# Patient Record
Sex: FEMALE | Race: ASIAN | NOT HISPANIC OR LATINO
[De-identification: names, ages, dates, MRNs, and addresses within clinical notes are randomized per-mention and may not be internally consistent; named-entity substitution may affect disease eponyms.]

---

## 2019-03-25 ENCOUNTER — APPOINTMENT (OUTPATIENT)
Dept: MAMMOGRAPHY | Facility: IMAGING CENTER | Age: 84
End: 2019-03-25
Payer: MEDICAID

## 2019-03-25 ENCOUNTER — APPOINTMENT (OUTPATIENT)
Dept: ULTRASOUND IMAGING | Facility: IMAGING CENTER | Age: 84
End: 2019-03-25
Payer: MEDICAID

## 2019-03-25 ENCOUNTER — OUTPATIENT (OUTPATIENT)
Dept: OUTPATIENT SERVICES | Facility: HOSPITAL | Age: 84
LOS: 1 days | End: 2019-03-25
Payer: MEDICAID

## 2019-03-25 DIAGNOSIS — Z00.8 ENCOUNTER FOR OTHER GENERAL EXAMINATION: ICD-10-CM

## 2019-03-25 PROBLEM — Z00.00 ENCOUNTER FOR PREVENTIVE HEALTH EXAMINATION: Status: ACTIVE | Noted: 2019-03-25

## 2019-03-25 PROCEDURE — G0279: CPT | Mod: 26

## 2019-03-25 PROCEDURE — 76642 ULTRASOUND BREAST LIMITED: CPT | Mod: 26,LT

## 2019-03-25 PROCEDURE — 77066 DX MAMMO INCL CAD BI: CPT | Mod: 26

## 2019-04-01 ENCOUNTER — RESULT REVIEW (OUTPATIENT)
Age: 84
End: 2019-04-01

## 2019-04-01 ENCOUNTER — APPOINTMENT (OUTPATIENT)
Dept: ULTRASOUND IMAGING | Facility: IMAGING CENTER | Age: 84
End: 2019-04-01
Payer: MEDICAID

## 2019-04-01 ENCOUNTER — OUTPATIENT (OUTPATIENT)
Dept: OUTPATIENT SERVICES | Facility: HOSPITAL | Age: 84
LOS: 1 days | End: 2019-04-01
Payer: MEDICAID

## 2019-04-01 DIAGNOSIS — Z00.8 ENCOUNTER FOR OTHER GENERAL EXAMINATION: ICD-10-CM

## 2019-04-01 PROCEDURE — 19000 PUNCTURE ASPIR CYST BREAST: CPT | Mod: LT

## 2019-04-01 PROCEDURE — 88305 TISSUE EXAM BY PATHOLOGIST: CPT

## 2019-04-01 PROCEDURE — 88173 CYTOPATH EVAL FNA REPORT: CPT

## 2019-04-01 PROCEDURE — 88173 CYTOPATH EVAL FNA REPORT: CPT | Mod: 26

## 2019-04-01 PROCEDURE — 76642 ULTRASOUND BREAST LIMITED: CPT

## 2019-04-01 PROCEDURE — 76942 ECHO GUIDE FOR BIOPSY: CPT

## 2019-04-01 PROCEDURE — 77065 DX MAMMO INCL CAD UNI: CPT

## 2019-04-01 PROCEDURE — 19000 PUNCTURE ASPIR CYST BREAST: CPT

## 2019-04-01 PROCEDURE — 88305 TISSUE EXAM BY PATHOLOGIST: CPT | Mod: 26

## 2019-04-01 PROCEDURE — 19083 BX BREAST 1ST LESION US IMAG: CPT

## 2019-04-01 PROCEDURE — 77065 DX MAMMO INCL CAD UNI: CPT | Mod: 26,LT

## 2019-04-01 PROCEDURE — A4648: CPT

## 2019-04-01 PROCEDURE — 88377 M/PHMTRC ALYS ISHQUANT/SEMIQ: CPT

## 2019-04-01 PROCEDURE — 19083 BX BREAST 1ST LESION US IMAG: CPT | Mod: LT

## 2019-04-01 PROCEDURE — 88305 TISSUE EXAM BY PATHOLOGIST: CPT | Mod: 26,59

## 2019-04-01 PROCEDURE — 88360 TUMOR IMMUNOHISTOCHEM/MANUAL: CPT | Mod: 26

## 2019-04-01 PROCEDURE — 88377 M/PHMTRC ALYS ISHQUANT/SEMIQ: CPT | Mod: 26

## 2019-04-01 PROCEDURE — 76942 ECHO GUIDE FOR BIOPSY: CPT | Mod: 26,59

## 2019-04-01 PROCEDURE — 88360 TUMOR IMMUNOHISTOCHEM/MANUAL: CPT

## 2019-04-01 PROCEDURE — G0279: CPT

## 2019-04-01 PROCEDURE — 77066 DX MAMMO INCL CAD BI: CPT

## 2019-04-04 LAB — NON-GYNECOLOGICAL CYTOLOGY STUDY: SIGNIFICANT CHANGE UP

## 2019-04-08 ENCOUNTER — APPOINTMENT (OUTPATIENT)
Dept: SURGICAL ONCOLOGY | Facility: CLINIC | Age: 84
End: 2019-04-08
Payer: MEDICAID

## 2019-04-08 VITALS
DIASTOLIC BLOOD PRESSURE: 66 MMHG | HEART RATE: 72 BPM | WEIGHT: 160 LBS | HEIGHT: 66 IN | BODY MASS INDEX: 25.71 KG/M2 | SYSTOLIC BLOOD PRESSURE: 169 MMHG | RESPIRATION RATE: 18 BRPM

## 2019-04-08 DIAGNOSIS — Z78.9 OTHER SPECIFIED HEALTH STATUS: ICD-10-CM

## 2019-04-08 PROCEDURE — 99205 OFFICE O/P NEW HI 60 MIN: CPT

## 2019-04-08 NOTE — ASSESSMENT
[FreeTextEntry1] : IMP:\par Newly diagnosed left breast cancer\par \par PLAN:\par The patient and I discussed the surgical management of breast cancer. I explained that breast cancer can be treated with 2 main surgical approaches. One is breast conserving therapy. The other is mastectomy with or without immediate reconstruction by a plastic surgeon. Breast conserving therapy involves wide excision of the involved area. Negative margins must be achieved with this wide excision. In addition, evaluation of the lymph nodes either with a sentinel lymph node biopsy or an axillary lymph node dissection may be required. This treatment usually requires postoperative radiation to the breast. Treatment with mastectomy also involves evaluation of the lymph node with a sentinel lymph node biopsy or axillary lymph node dissection. Post operative radiation therapy may be needed even after mastectomy in certain advanced cases.\par

## 2019-04-08 NOTE — HISTORY OF PRESENT ILLNESS
[de-identified] : 89 year-old female presents for an initial consultation.  She was referred for breast imaging on 3/25/19 to evaluate a palpable abnormality in the left breast.  Mammogram revealed a 6.3 cm mass in the slightly outer retroareolar left breast in the region of palpable concern, corresponding to a large predominantly cystic mass containing solid mural nodule elements at 1:00 on ultrasound, along with left skin thickening and left nipple retraction.  Ultrasound guided biopsy was recommended (BIRADS 4c).\par \par Ultrasound guided biopsy was consistent with invasive ductal carcinoma, ER+/MI-/HER2-.\par \par She has a family history of breast cancer involving her grand daughter who was diagnosed at age 44.\par \par Her past medical history includes hypertension, hyperlipidemia, diabetes and GERD.  She takes baby aspirin daily.

## 2019-04-08 NOTE — CONSULT LETTER
[Dear  ___] : Dear  [unfilled], [Consult Letter:] : I had the pleasure of evaluating your patient, [unfilled]. [Consult Closing:] : Thank you very much for allowing me to participate in the care of this patient.  If you have any questions, please do not hesitate to contact me. [Sincerely,] : Sincerely, [FreeTextEntry2] : Fredis Paidoussis MD

## 2019-04-08 NOTE — OB HISTORY
[Menopause Age____] : age at menopause was [unfilled] [Total Preg ___] : G[unfilled] [Live Births ___] : P[unfilled]  [FreeTextEntry2] : age 20 at first pregnancy

## 2019-04-08 NOTE — PHYSICAL EXAM
[Normal] : supple, no neck mass and thyroid not enlarged [Normal Supraclavicular Lymph Nodes] : normal supraclavicular lymph nodes [Normal Axillary Lymph Nodes] : normal axillary lymph nodes [Normal] : oriented to person, place and time, with appropriate affect [FreeTextEntry1] : AB present during exam  [de-identified] : Normal S1, S2.  Regular rate and rhythm.  [de-identified] : Complete breast exam performed in supine and upright positions.  Left breast 12:30-3:00 location 5 cm mass with overlying redness and nipple retraction.  No right breast mass or nipple discharge.  Probable left axillary lymphadenopathy.  [de-identified] : Clear breath sounds bilaterally, normal respiratory effort.  [de-identified] : uses wheelchair

## 2019-04-09 ENCOUNTER — TRANSCRIPTION ENCOUNTER (OUTPATIENT)
Age: 84
End: 2019-04-09

## 2019-04-18 ENCOUNTER — OUTPATIENT (OUTPATIENT)
Dept: OUTPATIENT SERVICES | Facility: HOSPITAL | Age: 84
LOS: 1 days | Discharge: HOME | End: 2019-04-18

## 2019-04-18 DIAGNOSIS — C50.919 MALIGNANT NEOPLASM OF UNSPECIFIED SITE OF UNSPECIFIED FEMALE BREAST: ICD-10-CM

## 2019-04-22 ENCOUNTER — APPOINTMENT (OUTPATIENT)
Dept: HEMATOLOGY ONCOLOGY | Facility: CLINIC | Age: 84
End: 2019-04-22
Payer: MEDICAID

## 2019-04-22 VITALS
SYSTOLIC BLOOD PRESSURE: 133 MMHG | DIASTOLIC BLOOD PRESSURE: 65 MMHG | TEMPERATURE: 98.2 F | RESPIRATION RATE: 16 BRPM | OXYGEN SATURATION: 95 % | HEART RATE: 86 BPM

## 2019-04-22 DIAGNOSIS — Z80.3 FAMILY HISTORY OF MALIGNANT NEOPLASM OF BREAST: ICD-10-CM

## 2019-04-22 DIAGNOSIS — Z91.81 HISTORY OF FALLING: ICD-10-CM

## 2019-04-22 PROCEDURE — 99205 OFFICE O/P NEW HI 60 MIN: CPT

## 2019-04-24 PROBLEM — Z91.81 HISTORY OF FALL: Status: RESOLVED | Noted: 2019-04-24 | Resolved: 2019-04-24

## 2019-04-24 PROBLEM — Z80.3 FAMILY HISTORY OF MALIGNANT NEOPLASM OF FEMALE BREAST: Status: ACTIVE | Noted: 2019-04-24

## 2019-04-24 NOTE — REVIEW OF SYSTEMS
[Difficulty Walking] : difficulty walking [Negative] : Heme/Lymph [Skin Wound] : no skin wound [Skin Rash] : no skin rash [Dizziness] : no dizziness [Confused] : no confusion [Fainting] : no fainting [de-identified] : left breast mass

## 2019-04-24 NOTE — CONSULT LETTER
[Dear  ___] : Dear  [unfilled], [( Thank you for referring [unfilled] for consultation for _____ )] : Thank you for referring [unfilled] for consultation for [unfilled] [Consult Letter:] : I had the pleasure of evaluating your patient, [unfilled]. [Consult Closing:] : Thank you very much for allowing me to participate in the care of this patient.  If you have any questions, please do not hesitate to contact me. [Please see my note below.] : Please see my note below. [Sincerely,] : Sincerely, [FreeTextEntry2] : Elver Sharif MD\par [address] [place] [address]\[place] [address] [FreeTextEntry3] : Donny Sadler MD\par Attending\par [facility]\par  [DrDanish  ___] : Dr. FERREIRA

## 2019-04-24 NOTE — ASSESSMENT
[FreeTextEntry1] : She is a 89 y/o  F with newly diagnosed left breast cancer. We reviewed her pathology and the standard of care for breast cancer. We reviewed her and her families' concerns about surgery. We reviewed if patient is a surgical candidate, we would favor surgical resection. If she is not a surgical candidate, we reviewed endocrine thearpy: anastrozole once a day. We reviewed the pros and cons of endocrine therapy including but not limited to: fatigue, arthralgias, stiffness, hot flashes, GI upset, and back pain. Given her baseline mobility issues, we reviewed with family potential of observation alone after surgical resection. Questions were answered to her family to their satisfaction. Will have social work follow up with family to see what additional services can be arranged at home. She will follow up with Dr Sharif.

## 2019-04-24 NOTE — REASON FOR VISIT
[Initial Consultation] : an initial consultation [Family Member] : family member [FreeTextEntry2] : right breast cancer

## 2019-05-01 ENCOUNTER — OUTPATIENT (OUTPATIENT)
Dept: OUTPATIENT SERVICES | Facility: HOSPITAL | Age: 84
LOS: 1 days | End: 2019-05-01
Payer: MEDICAID

## 2019-05-01 PROCEDURE — G9001: CPT

## 2019-05-02 ENCOUNTER — OUTPATIENT (OUTPATIENT)
Dept: OUTPATIENT SERVICES | Facility: HOSPITAL | Age: 84
LOS: 1 days | End: 2019-05-02
Payer: MEDICAID

## 2019-05-02 VITALS
TEMPERATURE: 98 F | WEIGHT: 154.1 LBS | HEART RATE: 63 BPM | OXYGEN SATURATION: 97 % | HEIGHT: 64 IN | SYSTOLIC BLOOD PRESSURE: 120 MMHG | DIASTOLIC BLOOD PRESSURE: 50 MMHG | RESPIRATION RATE: 16 BRPM

## 2019-05-02 DIAGNOSIS — Z87.19 PERSONAL HISTORY OF OTHER DISEASES OF THE DIGESTIVE SYSTEM: ICD-10-CM

## 2019-05-02 DIAGNOSIS — Z90.710 ACQUIRED ABSENCE OF BOTH CERVIX AND UTERUS: Chronic | ICD-10-CM

## 2019-05-02 DIAGNOSIS — C50.912 MALIGNANT NEOPLASM OF UNSPECIFIED SITE OF LEFT FEMALE BREAST: ICD-10-CM

## 2019-05-02 DIAGNOSIS — E11.9 TYPE 2 DIABETES MELLITUS WITHOUT COMPLICATIONS: ICD-10-CM

## 2019-05-02 DIAGNOSIS — Z90.49 ACQUIRED ABSENCE OF OTHER SPECIFIED PARTS OF DIGESTIVE TRACT: Chronic | ICD-10-CM

## 2019-05-02 DIAGNOSIS — I10 ESSENTIAL (PRIMARY) HYPERTENSION: ICD-10-CM

## 2019-05-02 LAB
ALBUMIN SERPL ELPH-MCNC: 3.7 G/DL — SIGNIFICANT CHANGE UP (ref 3.3–5)
ALP SERPL-CCNC: 48 U/L — SIGNIFICANT CHANGE UP (ref 40–120)
ALT FLD-CCNC: 11 U/L — SIGNIFICANT CHANGE UP (ref 4–33)
ANION GAP SERPL CALC-SCNC: 13 MMO/L — SIGNIFICANT CHANGE UP (ref 7–14)
AST SERPL-CCNC: 17 U/L — SIGNIFICANT CHANGE UP (ref 4–32)
BILIRUB SERPL-MCNC: 0.4 MG/DL — SIGNIFICANT CHANGE UP (ref 0.2–1.2)
BUN SERPL-MCNC: 30 MG/DL — HIGH (ref 7–23)
CALCIUM SERPL-MCNC: 9.3 MG/DL — SIGNIFICANT CHANGE UP (ref 8.4–10.5)
CHLORIDE SERPL-SCNC: 102 MMOL/L — SIGNIFICANT CHANGE UP (ref 98–107)
CO2 SERPL-SCNC: 25 MMOL/L — SIGNIFICANT CHANGE UP (ref 22–31)
CREAT SERPL-MCNC: 2.25 MG/DL — HIGH (ref 0.5–1.3)
GLUCOSE SERPL-MCNC: 259 MG/DL — HIGH (ref 70–99)
HBA1C BLD-MCNC: 7.6 % — HIGH (ref 4–5.6)
HCT VFR BLD CALC: 36.3 % — SIGNIFICANT CHANGE UP (ref 34.5–45)
HGB BLD-MCNC: 11.9 G/DL — SIGNIFICANT CHANGE UP (ref 11.5–15.5)
MCHC RBC-ENTMCNC: 32.5 PG — SIGNIFICANT CHANGE UP (ref 27–34)
MCHC RBC-ENTMCNC: 32.8 % — SIGNIFICANT CHANGE UP (ref 32–36)
MCV RBC AUTO: 99.2 FL — SIGNIFICANT CHANGE UP (ref 80–100)
NRBC # FLD: 0 K/UL — SIGNIFICANT CHANGE UP (ref 0–0)
PLATELET # BLD AUTO: 260 K/UL — SIGNIFICANT CHANGE UP (ref 150–400)
PMV BLD: 11.8 FL — SIGNIFICANT CHANGE UP (ref 7–13)
POTASSIUM SERPL-MCNC: 4.2 MMOL/L — SIGNIFICANT CHANGE UP (ref 3.5–5.3)
POTASSIUM SERPL-SCNC: 4.2 MMOL/L — SIGNIFICANT CHANGE UP (ref 3.5–5.3)
PROT SERPL-MCNC: 7.2 G/DL — SIGNIFICANT CHANGE UP (ref 6–8.3)
RBC # BLD: 3.66 M/UL — LOW (ref 3.8–5.2)
RBC # FLD: 19.4 % — HIGH (ref 10.3–14.5)
SODIUM SERPL-SCNC: 140 MMOL/L — SIGNIFICANT CHANGE UP (ref 135–145)
WBC # BLD: 7.89 K/UL — SIGNIFICANT CHANGE UP (ref 3.8–10.5)
WBC # FLD AUTO: 7.89 K/UL — SIGNIFICANT CHANGE UP (ref 3.8–10.5)

## 2019-05-02 PROCEDURE — 93010 ELECTROCARDIOGRAM REPORT: CPT

## 2019-05-02 RX ORDER — SODIUM CHLORIDE 9 MG/ML
1000 INJECTION, SOLUTION INTRAVENOUS
Refills: 0 | Status: DISCONTINUED | OUTPATIENT
Start: 2019-05-15 | End: 2019-05-16

## 2019-05-02 NOTE — H&P PST ADULT - OTHER CARE PROVIDERS
evaluated by cardiologist in 1/2019 - unable to recall name of cardiologist evaluated by cardiologist in 1/2019 - unable to recall the name of cardiologist

## 2019-05-02 NOTE — H&P PST ADULT - NSICDXFAMILYHX_GEN_ALL_CORE_FT
FAMILY HISTORY:  Family history of rectal cancer, brother  FH: breast cancer, granddaughter  FH: diabetes mellitus, sister

## 2019-05-02 NOTE — H&P PST ADULT - NSICDXPASTMEDICALHX_GEN_ALL_CORE_FT
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

## 2019-05-02 NOTE — H&P PST ADULT - ACTIVITY
ambulate with a walker at home, adl ambulate with a walker at home, adl, on w/c when goes to outside

## 2019-05-02 NOTE — H&P PST ADULT - NEGATIVE GENERAL GENITOURINARY SYMPTOMS
no gas in urine/no dysuria/normal urinary frequency/no hematuria/no urine discoloration/no bladder infections/no urinary hesitancy

## 2019-05-02 NOTE — H&P PST ADULT - NEGATIVE ENMT SYMPTOMS
no nasal discharge/no post-nasal discharge/no throat pain/no dysphagia/no tinnitus/no nasal obstruction/no sinus symptoms/no ear pain/no nasal congestion

## 2019-05-02 NOTE — H&P PST ADULT - HISTORY OF PRESENT ILLNESS
89 yo female with hx of HTN, DMT2, HLD presents to have PST evaluation with preop dx of malignant neoplasm of unspecified site of left female breast on 5/15/2019. Patient reports, she felt lump on left breast, went for an evaluation, had mammogram done, which revealed "abnormality on left breast", was sent for further evaluation, had biopsy done which revealed "malignancy", now scheduled for left breat lumpectomy on 5/15/2019 89 yo female with hx of HTN, DMT2, HLD, GERD presents to have PST evaluation with preop dx of malignant neoplasm of unspecified site of left female breast on 5/15/2019. Patient reports, felt a lump on left breast, went for an evaluation, had mammogram done, which revealed "abnormality on left breast", was sent for further evaluation, had biopsy done which revealed "malignancy", now scheduled for left breat lumpectomy on 5/15/2019

## 2019-05-02 NOTE — H&P PST ADULT - NSICDXPROBLEM_GEN_ALL_CORE_FT
PROBLEM DIAGNOSES  Problem: Malignant neoplasm of unspecified site of left female breast  Assessment and Plan: Scheduled for left breast lumpectomy on 5/15/2019.  labs done and results pending. Surgical scrub & preop instruction given and explained. Verbalized understanding.     Problem: H/O gastroesophageal reflux (GERD)  Assessment and Plan: Instructed to take omeprazole AM of surgery with a sip of water.     Problem: DM (diabetes mellitus)  Assessment and Plan: HbgA1C sent, pending result. Blood glucose - Accuchek ordered stat on admit.   Instructed to administer 32 units of Basaglar AM on 5/14/2019 & 24 units PM on 5/14/2019 (80 % of regular dose). Instructed to administer 20 units of Basaglar (50% of regular) AM of surgery.   OR booking was notified pt's DM status via fax.     Problem: Hypertension  Assessment and Plan: Instructed to take amlodipine, hydralazine AM of surgery with a sip of water.   Patient is scheduled for medical evaluation. Comparison EKG requested.

## 2019-05-02 NOTE — H&P PST ADULT - LANGUAGE ASSISTANCE NEEDED
Pt understands English, speaks little English Pt understands English, speaks little English - granddaughter (Selene) interpreted as needed.

## 2019-05-03 PROBLEM — E11.9 TYPE 2 DIABETES MELLITUS WITHOUT COMPLICATIONS: Chronic | Status: ACTIVE | Noted: 2019-05-02

## 2019-05-14 ENCOUNTER — TRANSCRIPTION ENCOUNTER (OUTPATIENT)
Age: 84
End: 2019-05-14

## 2019-05-14 PROBLEM — E78.5 HYPERLIPIDEMIA, UNSPECIFIED: Chronic | Status: ACTIVE | Noted: 2019-05-02

## 2019-05-14 PROBLEM — I10 ESSENTIAL (PRIMARY) HYPERTENSION: Chronic | Status: ACTIVE | Noted: 2019-05-02

## 2019-05-14 PROBLEM — Z87.19 PERSONAL HISTORY OF OTHER DISEASES OF THE DIGESTIVE SYSTEM: Chronic | Status: ACTIVE | Noted: 2019-05-02

## 2019-05-14 NOTE — ASU PATIENT PROFILE, ADULT - ABLE TO REACH PT
no/unable to leave mom, mail box full unable to leave mom, mail box full spoke with pt. and gave detailed instructions/yes

## 2019-05-15 ENCOUNTER — APPOINTMENT (OUTPATIENT)
Dept: SURGICAL ONCOLOGY | Facility: HOSPITAL | Age: 84
End: 2019-05-15

## 2019-05-15 ENCOUNTER — RESULT REVIEW (OUTPATIENT)
Age: 84
End: 2019-05-15

## 2019-05-15 ENCOUNTER — INPATIENT (INPATIENT)
Facility: HOSPITAL | Age: 84
LOS: 0 days | Discharge: HOME HEALTH | End: 2019-05-16
Attending: SURGERY | Admitting: SURGERY
Payer: MEDICAID

## 2019-05-15 VITALS
WEIGHT: 154.1 LBS | DIASTOLIC BLOOD PRESSURE: 53 MMHG | HEART RATE: 71 BPM | OXYGEN SATURATION: 97 % | TEMPERATURE: 98 F | SYSTOLIC BLOOD PRESSURE: 145 MMHG | HEIGHT: 63 IN | RESPIRATION RATE: 16 BRPM

## 2019-05-15 DIAGNOSIS — Z90.710 ACQUIRED ABSENCE OF BOTH CERVIX AND UTERUS: Chronic | ICD-10-CM

## 2019-05-15 DIAGNOSIS — C50.912 MALIGNANT NEOPLASM OF UNSPECIFIED SITE OF LEFT FEMALE BREAST: ICD-10-CM

## 2019-05-15 DIAGNOSIS — Z90.49 ACQUIRED ABSENCE OF OTHER SPECIFIED PARTS OF DIGESTIVE TRACT: Chronic | ICD-10-CM

## 2019-05-15 LAB
GLUCOSE BLDC GLUCOMTR-MCNC: 134 MG/DL — HIGH (ref 70–99)
GLUCOSE BLDC GLUCOMTR-MCNC: 364 MG/DL — HIGH (ref 70–99)
GLUCOSE BLDC GLUCOMTR-MCNC: 382 MG/DL — HIGH (ref 70–99)
GLUCOSE BLDC GLUCOMTR-MCNC: 67 MG/DL — LOW (ref 70–99)
GLUCOSE BLDC GLUCOMTR-MCNC: 80 MG/DL — SIGNIFICANT CHANGE UP (ref 70–99)

## 2019-05-15 PROCEDURE — 88307 TISSUE EXAM BY PATHOLOGIST: CPT | Mod: 26

## 2019-05-15 PROCEDURE — 88305 TISSUE EXAM BY PATHOLOGIST: CPT | Mod: 26

## 2019-05-15 RX ORDER — SODIUM CHLORIDE 9 MG/ML
1000 INJECTION, SOLUTION INTRAVENOUS
Refills: 0 | Status: DISCONTINUED | OUTPATIENT
Start: 2019-05-15 | End: 2019-05-16

## 2019-05-15 RX ORDER — DOCUSATE SODIUM 100 MG
100 CAPSULE ORAL THREE TIMES A DAY
Refills: 0 | Status: DISCONTINUED | OUTPATIENT
Start: 2019-05-15 | End: 2019-05-16

## 2019-05-15 RX ORDER — INSULIN LISPRO 100/ML
VIAL (ML) SUBCUTANEOUS AT BEDTIME
Refills: 0 | Status: DISCONTINUED | OUTPATIENT
Start: 2019-05-15 | End: 2019-05-16

## 2019-05-15 RX ORDER — DEXTROSE 50 % IN WATER 50 %
20 SYRINGE (ML) INTRAVENOUS ONCE
Refills: 0 | Status: COMPLETED | OUTPATIENT
Start: 2019-05-15 | End: 2019-05-15

## 2019-05-15 RX ORDER — ASPIRIN/CALCIUM CARB/MAGNESIUM 324 MG
81 TABLET ORAL DAILY
Refills: 0 | Status: DISCONTINUED | OUTPATIENT
Start: 2019-05-16 | End: 2019-05-16

## 2019-05-15 RX ORDER — POLYETHYLENE GLYCOL 3350 17 G/17G
17 POWDER, FOR SOLUTION ORAL DAILY
Refills: 0 | Status: DISCONTINUED | OUTPATIENT
Start: 2019-05-16 | End: 2019-05-16

## 2019-05-15 RX ORDER — GLUCAGON INJECTION, SOLUTION 0.5 MG/.1ML
1 INJECTION, SOLUTION SUBCUTANEOUS ONCE
Refills: 0 | Status: DISCONTINUED | OUTPATIENT
Start: 2019-05-15 | End: 2019-05-16

## 2019-05-15 RX ORDER — INSULIN LISPRO 100/ML
VIAL (ML) SUBCUTANEOUS
Refills: 0 | Status: DISCONTINUED | OUTPATIENT
Start: 2019-05-15 | End: 2019-05-16

## 2019-05-15 RX ORDER — DEXTROSE 50 % IN WATER 50 %
25 SYRINGE (ML) INTRAVENOUS ONCE
Refills: 0 | Status: DISCONTINUED | OUTPATIENT
Start: 2019-05-15 | End: 2019-05-16

## 2019-05-15 RX ORDER — FENTANYL CITRATE 50 UG/ML
25 INJECTION INTRAVENOUS
Refills: 0 | Status: DISCONTINUED | OUTPATIENT
Start: 2019-05-15 | End: 2019-05-15

## 2019-05-15 RX ORDER — HYDRALAZINE HCL 50 MG
100 TABLET ORAL
Refills: 0 | Status: DISCONTINUED | OUTPATIENT
Start: 2019-05-15 | End: 2019-05-16

## 2019-05-15 RX ORDER — METOPROLOL TARTRATE 50 MG
50 TABLET ORAL DAILY
Refills: 0 | Status: DISCONTINUED | OUTPATIENT
Start: 2019-05-16 | End: 2019-05-16

## 2019-05-15 RX ORDER — DEXTROSE 50 % IN WATER 50 %
15 SYRINGE (ML) INTRAVENOUS ONCE
Refills: 0 | Status: DISCONTINUED | OUTPATIENT
Start: 2019-05-15 | End: 2019-05-16

## 2019-05-15 RX ORDER — INSULIN GLARGINE 100 [IU]/ML
40 INJECTION, SOLUTION SUBCUTANEOUS EVERY MORNING
Refills: 0 | Status: DISCONTINUED | OUTPATIENT
Start: 2019-05-15 | End: 2019-05-16

## 2019-05-15 RX ORDER — PANTOPRAZOLE SODIUM 20 MG/1
40 TABLET, DELAYED RELEASE ORAL
Refills: 0 | Status: DISCONTINUED | OUTPATIENT
Start: 2019-05-16 | End: 2019-05-16

## 2019-05-15 RX ORDER — FUROSEMIDE 40 MG
20 TABLET ORAL DAILY
Refills: 0 | Status: DISCONTINUED | OUTPATIENT
Start: 2019-05-16 | End: 2019-05-16

## 2019-05-15 RX ORDER — AMLODIPINE BESYLATE 2.5 MG/1
10 TABLET ORAL DAILY
Refills: 0 | Status: DISCONTINUED | OUTPATIENT
Start: 2019-05-16 | End: 2019-05-16

## 2019-05-15 RX ORDER — SIMVASTATIN 20 MG/1
20 TABLET, FILM COATED ORAL AT BEDTIME
Refills: 0 | Status: DISCONTINUED | OUTPATIENT
Start: 2019-05-15 | End: 2019-05-16

## 2019-05-15 RX ORDER — ONDANSETRON 8 MG/1
4 TABLET, FILM COATED ORAL ONCE
Refills: 0 | Status: DISCONTINUED | OUTPATIENT
Start: 2019-05-15 | End: 2019-05-15

## 2019-05-15 RX ORDER — FENTANYL CITRATE 50 UG/ML
50 INJECTION INTRAVENOUS
Refills: 0 | Status: DISCONTINUED | OUTPATIENT
Start: 2019-05-15 | End: 2019-05-15

## 2019-05-15 RX ORDER — OXYCODONE HYDROCHLORIDE 5 MG/1
5 TABLET ORAL EVERY 6 HOURS
Refills: 0 | Status: DISCONTINUED | OUTPATIENT
Start: 2019-05-15 | End: 2019-05-16

## 2019-05-15 RX ORDER — DEXTROSE 50 % IN WATER 50 %
12.5 SYRINGE (ML) INTRAVENOUS ONCE
Refills: 0 | Status: DISCONTINUED | OUTPATIENT
Start: 2019-05-15 | End: 2019-05-16

## 2019-05-15 RX ORDER — DEXAMETHASONE 0.5 MG/5ML
8 ELIXIR ORAL ONCE
Refills: 0 | Status: DISCONTINUED | OUTPATIENT
Start: 2019-05-15 | End: 2019-05-15

## 2019-05-15 RX ORDER — DONEPEZIL HYDROCHLORIDE 10 MG/1
10 TABLET, FILM COATED ORAL AT BEDTIME
Refills: 0 | Status: DISCONTINUED | OUTPATIENT
Start: 2019-05-15 | End: 2019-05-16

## 2019-05-15 RX ORDER — INSULIN GLARGINE 100 [IU]/ML
30 INJECTION, SOLUTION SUBCUTANEOUS AT BEDTIME
Refills: 0 | Status: DISCONTINUED | OUTPATIENT
Start: 2019-05-15 | End: 2019-05-16

## 2019-05-15 RX ORDER — ACETAMINOPHEN 500 MG
975 TABLET ORAL EVERY 6 HOURS
Refills: 0 | Status: DISCONTINUED | OUTPATIENT
Start: 2019-05-15 | End: 2019-05-16

## 2019-05-15 RX ADMIN — SODIUM CHLORIDE 75 MILLILITER(S): 9 INJECTION, SOLUTION INTRAVENOUS at 15:00

## 2019-05-15 RX ADMIN — Medication 1 TABLET(S): at 18:03

## 2019-05-15 RX ADMIN — INSULIN GLARGINE 30 UNIT(S): 100 INJECTION, SOLUTION SUBCUTANEOUS at 22:38

## 2019-05-15 RX ADMIN — DONEPEZIL HYDROCHLORIDE 10 MILLIGRAM(S): 10 TABLET, FILM COATED ORAL at 22:38

## 2019-05-15 RX ADMIN — OXYCODONE HYDROCHLORIDE 5 MILLIGRAM(S): 5 TABLET ORAL at 22:38

## 2019-05-15 RX ADMIN — SIMVASTATIN 20 MILLIGRAM(S): 20 TABLET, FILM COATED ORAL at 22:38

## 2019-05-15 RX ADMIN — Medication 20 MILLILITER(S): at 12:31

## 2019-05-15 RX ADMIN — Medication 1 DROP(S): at 18:02

## 2019-05-15 RX ADMIN — Medication 3: at 22:39

## 2019-05-15 NOTE — BRIEF OPERATIVE NOTE - OPERATION/FINDINGS
Left breast lumpectomy. Lumpectomy specimen taken included 6.5 x 12 cm area of skin of the breast (including nipple). Margins as noted below. Tissues closed in layers primarily, then dermabond, steri-strips, and Mepilex dressings applied to skin.    Specimens:  1) left breast lumpectomy - double suture lateral, single suture short superior, long suture deep  2) deep margin

## 2019-05-15 NOTE — BRIEF OPERATIVE NOTE - NSICDXBRIEFPOSTOP_GEN_ALL_CORE_FT
POST-OP DIAGNOSIS:  Malignant neoplasm of unspecified site of left female breast 15-May-2019 14:43:18  Deya Marcelo

## 2019-05-15 NOTE — PROGRESS NOTE ADULT - SUBJECTIVE AND OBJECTIVE BOX
Surgery POST-OP CHECK NOTE:    Procedure: Left breast lumpectomy    Subjective: Resting comfortably in bed. Pain controlled. No N/V, CP, or SOB.    Vital Signs Last 24 Hrs  T(C): 36.5 (15 May 2019 17:00), Max: 36.8 (15 May 2019 14:20)  T(F): 97.7 (15 May 2019 17:00), Max: 98.2 (15 May 2019 14:20)  HR: 60 (15 May 2019 17:00) (60 - 81)  BP: 129/45 (15 May 2019 17:00) (118/47 - 148/58)  BP(mean): 66 (15 May 2019 17:00) (62 - 79)  RR: 17 (15 May 2019 17:00) (9 - 19)  SpO2: 95% (15 May 2019 17:00) (95% - 100%)  I&O's Summary    15 May 2019 07:01  -  15 May 2019 18:13  --------------------------------------------------------  IN: 615 mL / OUT: 0 mL / NET: 615 mL                 PHYSICAL EXAM:  Gen: NAD, well-developed  Neuro: AAOX3, PERRL, CNII-XII grossly intact  Chest:   CV: Pulse regularly present  Pulm: normal respiratory effort  GI: abd soft, nontender, nondistended  Ext: 2+ pulses throughout Surgery POST-OP CHECK NOTE:    Procedure: Left breast lumpectomy    Subjective: Resting comfortably in bed. Pain controlled. No N/V, CP, or SOB.    Vital Signs Last 24 Hrs  T(C): 36.5 (15 May 2019 17:00), Max: 36.8 (15 May 2019 14:20)  T(F): 97.7 (15 May 2019 17:00), Max: 98.2 (15 May 2019 14:20)  HR: 60 (15 May 2019 17:00) (60 - 81)  BP: 129/45 (15 May 2019 17:00) (118/47 - 148/58)  BP(mean): 66 (15 May 2019 17:00) (62 - 79)  RR: 17 (15 May 2019 17:00) (9 - 19)  SpO2: 95% (15 May 2019 17:00) (95% - 100%)  I&O's Summary    15 May 2019 07:01  -  15 May 2019 18:13  --------------------------------------------------------  IN: 615 mL / OUT: 0 mL / NET: 615 mL                 PHYSICAL EXAM:  Gen: NAD, well-developed  Neuro: AAOX3, PERRL, CNII-XII grossly intact  Chest: left breast lumpectomy site dressing w/ serous drainage at lateral aspect, surgical bra in place  CV: Pulse regularly present  Pulm: normal respiratory effort  GI: abd soft, nontender, nondistended  Ext: 2+ pulses throughout

## 2019-05-15 NOTE — BRIEF OPERATIVE NOTE - NSICDXBRIEFPREOP_GEN_ALL_CORE_FT
PRE-OP DIAGNOSIS:  Malignant neoplasm of unspecified site of left female breast 15-May-2019 14:43:08  Deya Marcelo

## 2019-05-15 NOTE — PROGRESS NOTE ADULT - ASSESSMENT
89yo F w/ L breast lump & mammogram that showed abnormality. Presented 5/15 & underwent L breast lumpectomy.    Plan  - Pain control: Tylenol 975mg PO q6h PRN, Oxy IR 5mg PO q6h PRN  - Consistent carb diet  - Monitor surgical site for signs of bleeding or hematoma  - 89yo F w/ L breast lump & mammogram that showed abnormality. Presented 5/15 & underwent L breast lumpectomy.    Plan  - Pain control: Tylenol 975mg PO q6h PRN, Oxy IR 5mg PO q6h PRN  - Consistent carb diet  - Monitor surgical site for signs of bleeding or hematoma  - Keep surgical bra in place

## 2019-05-16 ENCOUNTER — TRANSCRIPTION ENCOUNTER (OUTPATIENT)
Age: 84
End: 2019-05-16

## 2019-05-16 VITALS
OXYGEN SATURATION: 99 % | SYSTOLIC BLOOD PRESSURE: 148 MMHG | TEMPERATURE: 98 F | RESPIRATION RATE: 17 BRPM | HEART RATE: 68 BPM | DIASTOLIC BLOOD PRESSURE: 52 MMHG

## 2019-05-16 DIAGNOSIS — Z71.89 OTHER SPECIFIED COUNSELING: ICD-10-CM

## 2019-05-16 LAB
GLUCOSE BLDC GLUCOMTR-MCNC: 118 MG/DL — HIGH (ref 70–99)
GLUCOSE BLDC GLUCOMTR-MCNC: 163 MG/DL — HIGH (ref 70–99)

## 2019-05-16 RX ORDER — OXYCODONE HYDROCHLORIDE 5 MG/1
1 TABLET ORAL
Qty: 8 | Refills: 0
Start: 2019-05-16 | End: 2019-05-17

## 2019-05-16 RX ADMIN — Medication 100 MILLIGRAM(S): at 06:31

## 2019-05-16 RX ADMIN — OXYCODONE HYDROCHLORIDE 5 MILLIGRAM(S): 5 TABLET ORAL at 08:47

## 2019-05-16 RX ADMIN — Medication 1 DROP(S): at 06:30

## 2019-05-16 RX ADMIN — Medication 1 TABLET(S): at 06:32

## 2019-05-16 RX ADMIN — Medication 20 MILLIGRAM(S): at 06:31

## 2019-05-16 RX ADMIN — SODIUM CHLORIDE 75 MILLILITER(S): 9 INJECTION, SOLUTION INTRAVENOUS at 06:29

## 2019-05-16 RX ADMIN — PANTOPRAZOLE SODIUM 40 MILLIGRAM(S): 20 TABLET, DELAYED RELEASE ORAL at 06:30

## 2019-05-16 RX ADMIN — INSULIN GLARGINE 40 UNIT(S): 100 INJECTION, SOLUTION SUBCUTANEOUS at 08:56

## 2019-05-16 RX ADMIN — SODIUM CHLORIDE 75 MILLILITER(S): 9 INJECTION, SOLUTION INTRAVENOUS at 08:58

## 2019-05-16 RX ADMIN — Medication 1: at 08:57

## 2019-05-16 RX ADMIN — Medication 81 MILLIGRAM(S): at 12:34

## 2019-05-16 NOTE — DISCHARGE NOTE NURSING/CASE MANAGEMENT/SOCIAL WORK - NSDCDPATPORTLINK_GEN_ALL_CORE
You can access the [facility], offered by [facility], by registering with the following website: [email or web address]

## 2019-05-16 NOTE — PROGRESS NOTE ADULT - SUBJECTIVE AND OBJECTIVE BOX
SUBJECTIVE:    Patient seen and examined, no acute events overnight.   Tolerating PO, pain controlled, no complaints.     OBJECTIVE:     ** VITAL SIGNS / I&O's **    T(C): 36.6 (05-16-19 @ 07:47), Max: 36.9 (05-15-19 @ 23:54)  T(F): 97.8 (05-16-19 @ 07:47), Max: 98.4 (05-15-19 @ 23:54)  HR: 77 (05-16-19 @ 07:47) (55 - 81)  BP: 135/44 (05-16-19 @ 07:47) (118/47 - 148/58)  RR: 17 (05-16-19 @ 07:47) (9 - 19)  SpO2: 94% (05-16-19 @ 07:47) (93% - 100%)      15 May 2019 07:01  -  16 May 2019 07:00  --------------------------------------------------------  IN:    lactated ringers.: 425 mL    Oral Fluid: 340 mL  Total IN: 765 mL    OUT:    Voided: 200 mL  Total OUT: 200 mL    Total NET: 565 mL      16 May 2019 07:01  -  16 May 2019 12:46  --------------------------------------------------------  IN:  Total IN: 0 mL    OUT:    Voided: 500 mL  Total OUT: 500 mL    Total NET: -500 mL          ** PHYSICAL EXAM **    - General: NAD   - Breast: Left breast s/p lumpectomy, incision CDI, steristrips intact, no collections.     ** LABS **                 CAPILLARY BLOOD GLUCOSE

## 2019-05-16 NOTE — DISCHARGE NOTE PROVIDER - NSDCFUADDINST_GEN_ALL_CORE_FT
WOUND CARE:  Please keep incisions clean and dry. Please do not Scrub or rub incisions. Do not use lotion or powder on incisions.   BATHING: You may shower and/or sponge bathe. You may use warm soapy water in the shower and rinse, pat dry.  ACTIVITY: No heavy lifting or straining. Otherwise, you may return to your usual level of physical activity. If you are taking narcotic pain medication DO NOT drive a car, operate machinery or make important decisions.  DIET: Return to your usual diet.  NOTIFY YOUR SURGEON IF: You have any bleeding that does not stop, any pus draining from your wound(s), increased pain at surgical site, any fever (over 100.4 F) persistent nausea/vomiting, or if your pain is not controlled on your discharge pain medications.  Please follow up with your primary care physician in 1-2 weeks regarding your hospitalization.  Please follow up with your surgeon, Dr. Sharif in 1 week. Please call office to make an appointment.

## 2019-05-16 NOTE — DISCHARGE NOTE PROVIDER - NSDCCPCAREPLAN_GEN_ALL_CORE_FT
PRINCIPAL DISCHARGE DIAGNOSIS  Diagnosis: Malignant neoplasm of unspecified site of left female breast  Assessment and Plan of Treatment:

## 2019-05-16 NOTE — PROGRESS NOTE ADULT - SUBJECTIVE AND OBJECTIVE BOX
Vital Signs Last 24 Hrs  T(C): 36.6 (16 May 2019 07:47), Max: 36.9 (15 May 2019 23:54)  T(F): 97.8 (16 May 2019 07:47), Max: 98.4 (15 May 2019 23:54)  HR: 77 (16 May 2019 07:47) (55 - 81)  BP: 135/44 (16 May 2019 07:47) (118/47 - 148/58)  BP(mean): 66 (15 May 2019 17:00) (62 - 79)  RR: 17 (16 May 2019 07:47) (9 - 19)  SpO2: 94% (16 May 2019 07:47) (93% - 100%)    I&O's Detail    15 May 2019 07:01  -  16 May 2019 07:00  --------------------------------------------------------  IN:    lactated ringers.: 425 mL    Oral Fluid: 340 mL  Total IN: 765 mL    OUT:    Voided: 200 mL  Total OUT: 200 mL    Total NET: 565 mL      16 May 2019 07:01  -  16 May 2019 09:17  --------------------------------------------------------  IN:  Total IN: 0 mL    OUT:    Voided: 200 mL  Total OUT: 200 mL    Total NET: -200 mL    Awake , alert comfortable    PE   incision: c,d ,i. No hematoma or seroma    s/p left lumpectomy    Discharge and f/u my office 10 days                          PLAN:as above.

## 2019-05-16 NOTE — DISCHARGE NOTE PROVIDER - CARE PROVIDER_API CALL
Elver Sharif)  Surgery  [address] [place] [address] [id number]  Phone: [phone]  Fax: [phone]  Follow Up Time: 1 week

## 2019-05-16 NOTE — PROGRESS NOTE ADULT - ASSESSMENT
A/P     89 y/o s/p L breast lumpectomy POD 1, doing well:     - pain control   - Continue wearing surgical bra for support   - Regular diet   - DVT ppx   - DC home today with OP follow up     [id number]

## 2019-05-16 NOTE — DISCHARGE NOTE NURSING/CASE MANAGEMENT/SOCIAL WORK - NSDCPNINST_GEN_ALL_CORE
Take medications as prescribed. Follow-up with MD as instructed. Continue a diabetic healthy diet. Do not lift more than 5 pounds. Continue to monitor surgical incision for signs of infection (i.e. swelling, redness drainage, etc.). Call MD if temp greater than 101.

## 2019-05-16 NOTE — DISCHARGE NOTE PROVIDER - HOSPITAL COURSE
89 yo female with hx of HTN, DMT2, HLD, GERD presents to have PST evaluation with preop dx of malignant neoplasm of unspecified site of left female breast on 5/15/2019. Patient reports, felt a lump on left breast, went for an evaluation, had mammogram done, which revealed "abnormality on left breast", was sent for further evaluation, had biopsy done which revealed "malignancy", now scheduled for left breat lumpectomy on 5/15/2019        Pt went to OR for Left breast lumpectomy 5/14. Pt tolerated procedure well. Post operatively pts  diet was slowly advanced as tolerated. At this time, pt is tolerating a regular diet, ambulating and voiding.  Pt has been deemed stable for discharge at this time by attending.

## 2019-05-21 LAB — SURGICAL PATHOLOGY STUDY: SIGNIFICANT CHANGE UP

## 2019-05-23 ENCOUNTER — OUTPATIENT (OUTPATIENT)
Dept: OUTPATIENT SERVICES | Facility: HOSPITAL | Age: 84
LOS: 1 days | Discharge: HOME | End: 2019-05-23

## 2019-05-23 DIAGNOSIS — Z90.710 ACQUIRED ABSENCE OF BOTH CERVIX AND UTERUS: Chronic | ICD-10-CM

## 2019-05-23 DIAGNOSIS — Z90.49 ACQUIRED ABSENCE OF OTHER SPECIFIED PARTS OF DIGESTIVE TRACT: Chronic | ICD-10-CM

## 2019-05-23 DIAGNOSIS — C50.919 MALIGNANT NEOPLASM OF UNSPECIFIED SITE OF UNSPECIFIED FEMALE BREAST: ICD-10-CM

## 2019-05-28 ENCOUNTER — APPOINTMENT (OUTPATIENT)
Dept: HEMATOLOGY ONCOLOGY | Facility: CLINIC | Age: 84
End: 2019-05-28
Payer: MEDICAID

## 2019-05-28 VITALS
TEMPERATURE: 98.1 F | RESPIRATION RATE: 14 BRPM | HEART RATE: 61 BPM | OXYGEN SATURATION: 96 % | SYSTOLIC BLOOD PRESSURE: 130 MMHG | DIASTOLIC BLOOD PRESSURE: 61 MMHG

## 2019-05-28 PROCEDURE — 99214 OFFICE O/P EST MOD 30 MIN: CPT

## 2019-05-28 NOTE — HISTORY OF PRESENT ILLNESS
[Disease: _____________________] : Disease: [unfilled] [T: ___] : T[unfilled] [N: ___] : N[unfilled] [AJCC Stage: ____] : AJCC Stage: [unfilled] [de-identified] : Age 89: Left breast cancer\par Palpable breast mass on exam and had mammogram/ sonogram on 3/25/19 which showed large rounded mass (6.3 x 5.4 cm) in the outer retroareolar L breast 1:00. She had left breast biopsy on 4/1/19 which showed poorly differentiated carcinoma that was ER 25%, ID 0%, [id number] nonamplified. She saw Dr Sharif and underwent lumpectomy on 5/15/19. 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  [de-identified] : She tolerated surgery well. Woke up from the anesthesia without any pain.  Denies any pain at surgical site. She had episode of fast heart rate after getting OOB to BR but went back to normal. She denies any CP or persistent palpitations. She has more fatigue since the surgery but family feels she is close to baseline. She is eating. Her sister is staying with her in the evenings after the aide leaves but wants to have help since she wants to return to the [facility]. She will be seeing Dr Sharif on Thursday. Also seeing her PCP next week.  [de-identified] : poorly differentiated ER 25%, NV 0%, [id number] nonamplified

## 2019-05-28 NOTE — REASON FOR VISIT
[Follow-Up Visit] : a follow-up [Family Member] : family member [FreeTextEntry2] : follow up for breast cancer s/p lumpectomy

## 2019-05-28 NOTE — CONSULT LETTER
[Dear  ___] : Dear  [unfilled], [Courtesy Letter:] : I had the pleasure of seeing your patient, [unfilled], in my office today. [Please see my note below.] : Please see my note below. [Consult Closing:] : Thank you very much for allowing me to participate in the care of this patient.  If you have any questions, please do not hesitate to contact me. [Sincerely,] : Sincerely, [FreeTextEntry2] : Elver Sharif MD\par [address] [place] [address]\[place] [address] [FreeTextEntry3] : Donny Sadler MD\par Attending\par [facility]\par  [DrDanish  ___] : Dr. FERREIRA

## 2019-05-28 NOTE — REVIEW OF SYSTEMS
[Fever] : no fever [Chills] : no chills [Night Sweats] : no night sweats [Fatigue] : fatigue [Recent Change In Weight] : ~T no recent weight change [Chest Pain] : no chest pain [Palpitations] : palpitations [Leg Claudication] : no intermittent leg claudication [Lower Ext Edema] : no lower extremity edema [Skin Rash] : no skin rash [Skin Wound] : no skin wound [Negative] : Allergic/Immunologic [FreeTextEntry5] : occasional  [de-identified] : lumpectomy site with steristrips

## 2019-05-28 NOTE — PHYSICAL EXAM
[Capable of only limited self care, confined to bed or chair more than 50% of waking hours] : Status 3- Capable of only limited self care, confined to bed or chair more than 50% of waking hours [Normal] : affect appropriate [de-identified] : lumpectomy site C/D/I; no erythema or calor on palpation; no palpable axillary LN  [de-identified] : seen in wheelchair  [de-identified] : seen in wheelchair; hand  4/5 ; BUE proximal 4+/5 and BLE 4/5

## 2019-05-30 ENCOUNTER — APPOINTMENT (OUTPATIENT)
Dept: SURGICAL ONCOLOGY | Facility: CLINIC | Age: 84
End: 2019-05-30
Payer: MEDICAID

## 2019-05-30 VITALS
HEIGHT: 66 IN | HEART RATE: 61 BPM | OXYGEN SATURATION: 96 % | DIASTOLIC BLOOD PRESSURE: 55 MMHG | RESPIRATION RATE: 15 BRPM | SYSTOLIC BLOOD PRESSURE: 134 MMHG

## 2019-05-30 PROCEDURE — 99024 POSTOP FOLLOW-UP VISIT: CPT

## 2019-05-31 NOTE — ASSESSMENT
[FreeTextEntry1] : IMP:\par s/p left breast lumpectomy on 5/15/19 with a negative deep margin.\par \par PLAN:\par RTO in 6 months\par Referral back to med/onc and rad/onc.

## 2019-05-31 NOTE — PHYSICAL EXAM
[Normal] : normal respiratory effort, chest percussion and palpation [Normal Supraclavicular Lymph Nodes] : normal supraclavicular lymph nodes [Normal Axillary Lymph Nodes] : normal axillary lymph nodes [FreeTextEntry1] : RC present for exam.  [de-identified] : Left breast incision healing well without signs of infection, hematoma or seroma.  Steri strips removed.

## 2019-05-31 NOTE — HISTORY OF PRESENT ILLNESS
[de-identified] : 89 year-old female presents for her initial post op evaluation.  She is s/p left breast lumpectomy on 5/15/19.  Final path revealed a 3.5cm poorly differentiated ductal carcinoma with mucinous features and a negative deep margin.  Today she is feeling generally well with only minor discomfort in her left breast.  Denies post op fevers or drainage from breast.\par \par Pertinent history:\par She was referred for breast imaging on 3/25/19 to evaluate a palpable abnormality in the left breast.  Mammogram revealed a 6.3 cm mass in the slightly outer retroareolar left breast in the region of palpable concern, corresponding to a large predominantly cystic mass containing solid mural nodule elements at 1:00 on ultrasound, along with left skin thickening and left nipple retraction.  Ultrasound guided biopsy was recommended (BIRADS 4c).\par \par Ultrasound guided biopsy was consistent with invasive ductal carcinoma, ER+/SD-/HER2-.\par \par She has a family history of breast cancer involving her grand daughter who was diagnosed at age 44.\par \par Her past medical history includes hypertension, hyperlipidemia, diabetes and GERD.  She takes baby aspirin daily.

## 2019-06-04 ENCOUNTER — APPOINTMENT (OUTPATIENT)
Dept: SURGICAL ONCOLOGY | Facility: CLINIC | Age: 84
End: 2019-06-04

## 2019-07-08 ENCOUNTER — CLINICAL ADVICE (OUTPATIENT)
Age: 84
End: 2019-07-08

## 2019-07-18 ENCOUNTER — OUTPATIENT (OUTPATIENT)
Dept: OUTPATIENT SERVICES | Facility: HOSPITAL | Age: 84
LOS: 1 days | Discharge: HOME | End: 2019-07-18

## 2019-07-18 DIAGNOSIS — Z90.710 ACQUIRED ABSENCE OF BOTH CERVIX AND UTERUS: Chronic | ICD-10-CM

## 2019-07-18 DIAGNOSIS — Z90.49 ACQUIRED ABSENCE OF OTHER SPECIFIED PARTS OF DIGESTIVE TRACT: Chronic | ICD-10-CM

## 2019-07-18 DIAGNOSIS — C50.919 MALIGNANT NEOPLASM OF UNSPECIFIED SITE OF UNSPECIFIED FEMALE BREAST: ICD-10-CM

## 2019-07-22 ENCOUNTER — APPOINTMENT (OUTPATIENT)
Dept: HEMATOLOGY ONCOLOGY | Facility: CLINIC | Age: 84
End: 2019-07-22
Payer: MEDICARE

## 2019-07-22 ENCOUNTER — RESULT REVIEW (OUTPATIENT)
Age: 84
End: 2019-07-22

## 2019-07-22 VITALS
HEART RATE: 50 BPM | RESPIRATION RATE: 14 BRPM | TEMPERATURE: 98.8 F | OXYGEN SATURATION: 94 % | WEIGHT: 155.18 LBS | SYSTOLIC BLOOD PRESSURE: 155 MMHG | BODY MASS INDEX: 25.05 KG/M2 | DIASTOLIC BLOOD PRESSURE: 49 MMHG

## 2019-07-22 DIAGNOSIS — N63.0 UNSPECIFIED LUMP IN UNSPECIFIED BREAST: ICD-10-CM

## 2019-07-22 DIAGNOSIS — N28.9 DISORDER OF KIDNEY AND URETER, UNSPECIFIED: ICD-10-CM

## 2019-07-22 LAB
HCT VFR BLD CALC: 38.9 % — SIGNIFICANT CHANGE UP (ref 34.5–45)
HGB BLD-MCNC: 12.7 G/DL — SIGNIFICANT CHANGE UP (ref 11.5–15.5)
MCHC RBC-ENTMCNC: 32.5 G/DL — SIGNIFICANT CHANGE UP (ref 32–36)
MCHC RBC-ENTMCNC: 33.5 PG — SIGNIFICANT CHANGE UP (ref 27–34)
MCV RBC AUTO: 103 FL — HIGH (ref 80–100)
PLATELET # BLD AUTO: 213 K/UL — SIGNIFICANT CHANGE UP (ref 150–400)
RBC # BLD: 3.79 M/UL — LOW (ref 3.8–5.2)
RBC # FLD: 17.5 % — HIGH (ref 10.3–14.5)
WBC # BLD: 7 K/UL — SIGNIFICANT CHANGE UP (ref 3.8–10.5)
WBC # FLD AUTO: 7 K/UL — SIGNIFICANT CHANGE UP (ref 3.8–10.5)

## 2019-07-22 PROCEDURE — 99215 OFFICE O/P EST HI 40 MIN: CPT

## 2019-07-23 PROBLEM — N63.0 BREAST NODULE: Status: ACTIVE | Noted: 2019-07-23

## 2019-07-23 PROBLEM — N28.9 RENAL INSUFFICIENCY: Status: ACTIVE | Noted: 2019-07-23

## 2019-07-23 LAB
25(OH)D3 SERPL-MCNC: 30.2 NG/ML
ALBUMIN SERPL ELPH-MCNC: 4 G/DL
ALP BLD-CCNC: 48 U/L
ALT SERPL-CCNC: 11 U/L
ANION GAP SERPL CALC-SCNC: 12 MMOL/L
AST SERPL-CCNC: 25 U/L
BILIRUB SERPL-MCNC: 0.4 MG/DL
BUN SERPL-MCNC: 44 MG/DL
CALCIUM SERPL-MCNC: 10 MG/DL
CHLORIDE SERPL-SCNC: 102 MMOL/L
CO2 SERPL-SCNC: 26 MMOL/L
CREAT SERPL-MCNC: 2.63 MG/DL
GLUCOSE SERPL-MCNC: 281 MG/DL
POTASSIUM SERPL-SCNC: 5.5 MMOL/L
PROT SERPL-MCNC: 7.5 G/DL
SODIUM SERPL-SCNC: 140 MMOL/L

## 2019-07-23 NOTE — HISTORY OF PRESENT ILLNESS
[Disease: _____________________] : Disease: [unfilled] [N: ___] : N[unfilled] [T: ___] : T[unfilled] [AJCC Stage: ____] : AJCC Stage: [unfilled] [de-identified] : Age 89: Left breast cancer\par Palpable breast mass on exam and had mammogram/ sonogram on 3/25/19 which showed large rounded mass (6.3 x 5.4 cm) in the outer retroareolar L breast 1:00. She had left breast biopsy on 4/1/19 which showed poorly differentiated carcinoma that was ER 25%, KS 0%, [id number] nonamplified. She saw Dr Sharif and underwent lumpectomy on 5/15/19. 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  [de-identified] : Since last evaluation, she has noticed small nodule that started off at the scar site is growing. She denies any pain over site; no fevers or chills. She has been feeling breast site since she is worried about the change. She continues to have [facility] in her home but activity is limited and needs more help. Her family has touched base with [employer] and additional forms has been sent to insurance. Her family is concerned so many family members have cancer: brother with colon cancer, granddtr with breast cancer, niece x2 with breast cancer; another family member with lung cancer but does not recall age. Denies any new health change or medications.  [de-identified] : poorly differentiated ER 25%, VT 0%, [id number] nonamplified

## 2019-07-23 NOTE — CONSULT LETTER
[Dear  ___] : Dear  [unfilled], [Courtesy Letter:] : I had the pleasure of seeing your patient, [unfilled], in my office today. [Please see my note below.] : Please see my note below. [Consult Closing:] : Thank you very much for allowing me to participate in the care of this patient.  If you have any questions, please do not hesitate to contact me. [Sincerely,] : Sincerely, [FreeTextEntry2] : Elver Sharif MD\par [address] [place] [address]\[place] [address] [FreeTextEntry3] : Donny Sadler MD\par Attending\par [facility]\par  [DrDanish  ___] : Dr. FERREIRA

## 2019-07-23 NOTE — ASSESSMENT
[FreeTextEntry1] : She is a 89 y/o  F with Stage II B left breast cancer s/p left lumpectomy. We reviewed her pathology and the role of adjuvant therapy. She has new hard 2 cm nodularity over the lateral edge of her lumpectomy scar. We reviewed with her breast surgeon Dr Sharif and they will bring pt in for biopsy for further evaluation.  We reviewed anastrozole endocrine therapy and she will start MWF next week and see how she tolerates. \par \par Renal insufficiency: Cr elevated with no prior baseline evaluation: called office: Dr Antonio who is away on vacation. S/w his colleague and reviewed her baseline blood work: her Cr has been 2.1 to 2.6 for the last 2 years. Will encourage fluid hydration. \par \par Genetic testing: her family is concerned about many family members having breast cancer, colon cancer, lung cancer. We reviewed genetic testing which they would like to clarify if there is genetic component.

## 2019-07-23 NOTE — REVIEW OF SYSTEMS
[Negative] : Allergic/Immunologic [Skin Wound] : no skin wound [Skin Rash] : no skin rash [de-identified] : enlarging nodule over scar site

## 2019-07-23 NOTE — REASON FOR VISIT
[Follow-Up Visit] : a follow-up [Family Member] : family member [FreeTextEntry2] : follow up for breast cancer: noticing nodule over scar site enlarging

## 2019-07-23 NOTE — PHYSICAL EXAM
[Ambulatory and capable of all self care but unable to carry out any work activities] : Status 2- Ambulatory and capable of all self care but unable to carry out any work activities. Up and about more than 50% of waking hours [Normal] : affect appropriate [de-identified] : seen in wheelchair  [de-identified] : seen in wheelchair [de-identified] : lumpectomy site with 2 cm thickening and nodularity over the lateral scar site; no calor or fluctuance on palpation

## 2019-07-30 ENCOUNTER — LABORATORY RESULT (OUTPATIENT)
Age: 84
End: 2019-07-30

## 2019-07-30 ENCOUNTER — RESULT REVIEW (OUTPATIENT)
Age: 84
End: 2019-07-30

## 2019-07-30 ENCOUNTER — APPOINTMENT (OUTPATIENT)
Dept: SURGICAL ONCOLOGY | Facility: CLINIC | Age: 84
End: 2019-07-30
Payer: MEDICAID

## 2019-07-30 VITALS
WEIGHT: 155 LBS | TEMPERATURE: 98.8 F | RESPIRATION RATE: 14 BRPM | DIASTOLIC BLOOD PRESSURE: 51 MMHG | HEART RATE: 53 BPM | OXYGEN SATURATION: 94 % | SYSTOLIC BLOOD PRESSURE: 168 MMHG | HEIGHT: 66 IN | BODY MASS INDEX: 24.91 KG/M2

## 2019-07-30 DIAGNOSIS — Z13.71 ENCOUNTER FOR NONPROCREATIVE SCREENING FOR GENETIC DISEASE CARRIER STATUS: ICD-10-CM

## 2019-07-30 PROCEDURE — 10021 FNA BX W/O IMG GDN 1ST LES: CPT

## 2019-07-30 NOTE — HISTORY OF PRESENT ILLNESS
[de-identified] : 89 year-old female presents for a follow up visit.\par   She is s/p left breast lumpectomy on 5/15/19.  Final path revealed a 3.5cm poorly differentiated ductal carcinoma with mucinous features and a negative deep margin.  \par Today, 07/30/19,  the pt c/o possible keloid on LT nipple.  Denies  nipple discharge, skin changes, inversion or breast pain. Denies constitutional symptoms\par \par \par Past Hx: \par She was referred for breast imaging on 3/25/19 to evaluate a palpable abnormality in the left breast.  Mammogram revealed a 6.3 cm mass in the slightly outer retroareolar left breast in the region of palpable concern, corresponding to a large predominantly cystic mass containing solid mural nodule elements at 1:00 on ultrasound, along with left skin thickening and left nipple retraction.  Ultrasound guided biopsy was recommended (BIRADS 4c).\par \par Ultrasound guided biopsy was consistent with invasive ductal carcinoma, ER+/GA-/HER2-.\par \par She has a family history of breast cancer involving her grand daughter who was diagnosed at age 44.\par \par Her past medical history includes hypertension, hyperlipidemia, diabetes and GERD.  She takes baby aspirin daily.

## 2019-07-30 NOTE — PROCEDURE
[FreeTextEntry1] : Lesion prep with Betadine, consent obtained for fine needle aspiration. Fine needle aspiration performed without complications. Multiple smears obtained. Material was placed in placed CytoLite solution

## 2019-07-30 NOTE — PHYSICAL EXAM
[Normal] : supple, no neck mass and thyroid not enlarged [Normal Neck Lymph Nodes] : normal neck lymph nodes  [Normal Groin Lymph Nodes] : normal groin lymph nodes [Normal] : oriented to person, place and time, with appropriate affect [FreeTextEntry1] : I, Dean Diaz was present for the physical exam\par \par \par \par  [de-identified] : Red raised lesion lateral aspect of LT lumpectomy incision.

## 2019-08-13 ENCOUNTER — APPOINTMENT (OUTPATIENT)
Dept: SURGICAL ONCOLOGY | Facility: CLINIC | Age: 84
End: 2019-08-13
Payer: MEDICAID

## 2019-08-13 VITALS
WEIGHT: 155 LBS | HEART RATE: 62 BPM | DIASTOLIC BLOOD PRESSURE: 64 MMHG | BODY MASS INDEX: 24.91 KG/M2 | OXYGEN SATURATION: 95 % | HEIGHT: 66 IN | TEMPERATURE: 98.4 F | SYSTOLIC BLOOD PRESSURE: 133 MMHG | RESPIRATION RATE: 16 BRPM

## 2019-08-13 PROCEDURE — 99214 OFFICE O/P EST MOD 30 MIN: CPT | Mod: 24

## 2019-08-13 NOTE — ASSESSMENT
[FreeTextEntry1] : Imp:\par Red raised mass, weeping, LT portion of excision. Suspicious of malignancy\par Options discussed included surgery, prolonged endocrine therapy, and as a final solution may need radiation\par Referred to Dr. Morales\par Plan:\par F/u with Dr. Morales.\par Pt and family will decide\par

## 2019-08-13 NOTE — HISTORY OF PRESENT ILLNESS
[de-identified] : 89 y/o female patient presents for a post op visit. She had a FNA performed on 07/30/19 of a mass in LT scar from lumpectomy 2 months ago for CA. The pathology report shows it is suspicious for malignancy.\par \par Today the pt was without any complaints. Denies palpable breast masses, nipple discharge, skin changes, inversion or breast pain. Denies constitutional symptoms\par \par \par  \par

## 2019-08-13 NOTE — PHYSICAL EXAM
[FreeTextEntry1] : I, Dean Diaz was present for the physical exam\par \par \par \par \par \par \par \par  [de-identified] : Red raised mass, weeping, LT portion of excision.

## 2019-08-26 ENCOUNTER — OUTPATIENT (OUTPATIENT)
Dept: OUTPATIENT SERVICES | Facility: HOSPITAL | Age: 84
LOS: 1 days | End: 2019-08-26

## 2019-08-26 VITALS
SYSTOLIC BLOOD PRESSURE: 114 MMHG | HEIGHT: 63 IN | RESPIRATION RATE: 16 BRPM | DIASTOLIC BLOOD PRESSURE: 80 MMHG | WEIGHT: 147.05 LBS | TEMPERATURE: 98 F | HEART RATE: 59 BPM | OXYGEN SATURATION: 97 %

## 2019-08-26 DIAGNOSIS — Z98.890 OTHER SPECIFIED POSTPROCEDURAL STATES: Chronic | ICD-10-CM

## 2019-08-26 DIAGNOSIS — N63.20 UNSPECIFIED LUMP IN THE LEFT BREAST, UNSPECIFIED QUADRANT: ICD-10-CM

## 2019-08-26 DIAGNOSIS — C50.912 MALIGNANT NEOPLASM OF UNSPECIFIED SITE OF LEFT FEMALE BREAST: ICD-10-CM

## 2019-08-26 DIAGNOSIS — C50.919 MALIGNANT NEOPLASM OF UNSPECIFIED SITE OF UNSPECIFIED FEMALE BREAST: ICD-10-CM

## 2019-08-26 DIAGNOSIS — Z90.710 ACQUIRED ABSENCE OF BOTH CERVIX AND UTERUS: Chronic | ICD-10-CM

## 2019-08-26 DIAGNOSIS — E11.9 TYPE 2 DIABETES MELLITUS WITHOUT COMPLICATIONS: ICD-10-CM

## 2019-08-26 DIAGNOSIS — Z87.898 PERSONAL HISTORY OF OTHER SPECIFIED CONDITIONS: ICD-10-CM

## 2019-08-26 DIAGNOSIS — Z90.49 ACQUIRED ABSENCE OF OTHER SPECIFIED PARTS OF DIGESTIVE TRACT: Chronic | ICD-10-CM

## 2019-08-26 LAB
ALBUMIN SERPL ELPH-MCNC: 4.1 G/DL — SIGNIFICANT CHANGE UP (ref 3.3–5)
ALBUMIN SERPL ELPH-MCNC: 4.1 G/DL — SIGNIFICANT CHANGE UP (ref 3.3–5)
ALP SERPL-CCNC: 48 U/L — SIGNIFICANT CHANGE UP (ref 40–120)
ALP SERPL-CCNC: 48 U/L — SIGNIFICANT CHANGE UP (ref 40–120)
ALT FLD-CCNC: 10 U/L — SIGNIFICANT CHANGE UP (ref 4–33)
ALT FLD-CCNC: 10 U/L — SIGNIFICANT CHANGE UP (ref 4–33)
ANION GAP SERPL CALC-SCNC: 14 MMO/L — SIGNIFICANT CHANGE UP (ref 7–14)
AST SERPL-CCNC: 16 U/L — SIGNIFICANT CHANGE UP (ref 4–32)
AST SERPL-CCNC: 16 U/L — SIGNIFICANT CHANGE UP (ref 4–32)
BASOPHILS # BLD AUTO: 0.12 K/UL — SIGNIFICANT CHANGE UP (ref 0–0.2)
BASOPHILS NFR BLD AUTO: 1.1 % — SIGNIFICANT CHANGE UP (ref 0–2)
BILIRUB DIRECT SERPL-MCNC: < 0.2 MG/DL — SIGNIFICANT CHANGE UP (ref 0.1–0.2)
BILIRUB SERPL-MCNC: 0.5 MG/DL — SIGNIFICANT CHANGE UP (ref 0.2–1.2)
BILIRUB SERPL-MCNC: 0.5 MG/DL — SIGNIFICANT CHANGE UP (ref 0.2–1.2)
BUN SERPL-MCNC: 44 MG/DL — HIGH (ref 7–23)
CALCIUM SERPL-MCNC: 9.7 MG/DL — SIGNIFICANT CHANGE UP (ref 8.4–10.5)
CHLORIDE SERPL-SCNC: 100 MMOL/L — SIGNIFICANT CHANGE UP (ref 98–107)
CO2 SERPL-SCNC: 26 MMOL/L — SIGNIFICANT CHANGE UP (ref 22–31)
CREAT SERPL-MCNC: 2.92 MG/DL — HIGH (ref 0.5–1.3)
EOSINOPHIL # BLD AUTO: 0.16 K/UL — SIGNIFICANT CHANGE UP (ref 0–0.5)
EOSINOPHIL NFR BLD AUTO: 1.4 % — SIGNIFICANT CHANGE UP (ref 0–6)
GLUCOSE SERPL-MCNC: 278 MG/DL — HIGH (ref 70–99)
HBA1C BLD-MCNC: 8 % — HIGH (ref 4–5.6)
HCT VFR BLD CALC: 36.6 % — SIGNIFICANT CHANGE UP (ref 34.5–45)
HGB BLD-MCNC: 12.3 G/DL — SIGNIFICANT CHANGE UP (ref 11.5–15.5)
IMM GRANULOCYTES NFR BLD AUTO: 0.7 % — SIGNIFICANT CHANGE UP (ref 0–1.5)
LYMPHOCYTES # BLD AUTO: 1.94 K/UL — SIGNIFICANT CHANGE UP (ref 1–3.3)
LYMPHOCYTES # BLD AUTO: 17.6 % — SIGNIFICANT CHANGE UP (ref 13–44)
MCHC RBC-ENTMCNC: 33.1 PG — SIGNIFICANT CHANGE UP (ref 27–34)
MCHC RBC-ENTMCNC: 33.6 % — SIGNIFICANT CHANGE UP (ref 32–36)
MCV RBC AUTO: 98.4 FL — SIGNIFICANT CHANGE UP (ref 80–100)
MONOCYTES # BLD AUTO: 1.05 K/UL — HIGH (ref 0–0.9)
MONOCYTES NFR BLD AUTO: 9.5 % — SIGNIFICANT CHANGE UP (ref 2–14)
NEUTROPHILS # BLD AUTO: 7.69 K/UL — HIGH (ref 1.8–7.4)
NEUTROPHILS NFR BLD AUTO: 69.7 % — SIGNIFICANT CHANGE UP (ref 43–77)
NRBC # FLD: 0.02 K/UL — SIGNIFICANT CHANGE UP (ref 0–0)
PLATELET # BLD AUTO: 288 K/UL — SIGNIFICANT CHANGE UP (ref 150–400)
PMV BLD: 11.3 FL — SIGNIFICANT CHANGE UP (ref 7–13)
POTASSIUM SERPL-MCNC: 4.5 MMOL/L — SIGNIFICANT CHANGE UP (ref 3.5–5.3)
POTASSIUM SERPL-SCNC: 4.5 MMOL/L — SIGNIFICANT CHANGE UP (ref 3.5–5.3)
PROT SERPL-MCNC: 7.7 G/DL — SIGNIFICANT CHANGE UP (ref 6–8.3)
PROT SERPL-MCNC: 7.7 G/DL — SIGNIFICANT CHANGE UP (ref 6–8.3)
RBC # BLD: 3.72 M/UL — LOW (ref 3.8–5.2)
RBC # FLD: 17.7 % — HIGH (ref 10.3–14.5)
SODIUM SERPL-SCNC: 140 MMOL/L — SIGNIFICANT CHANGE UP (ref 135–145)
WBC # BLD: 11.04 K/UL — HIGH (ref 3.8–10.5)
WBC # FLD AUTO: 11.04 K/UL — HIGH (ref 3.8–10.5)

## 2019-08-26 NOTE — H&P PST ADULT - ASSESSMENT
89 yo female with hx of HTN, DMT2, HLD, GERD presents to have PST evaluation with preop dx of malignant neoplasm of unspecified site of left female breast on 5/15/2019. Patient reports, felt a lump on left breast, went for an evaluation, had mammogram done, which revealed "abnormality on left breast", was sent for further evaluation, had biopsy done which revealed "malignancy".  S/P  left breat lumpectomy on 5/15/2019.   Pt under tx with Anastrazole.  Pt recently  noticed another left breat lump, scheduled for Left breast lumpectomy 9/4/19. 89 yo female with hx of HTN, DMT2, HLD, GERD with hx of malignant neoplasm left female breast S/P  left breast lumpectomy on 5/15/2019. Pt under tx with Anastrazole.  Pt recently  noticed another left breat lump, scheduled for Left breast lumpectomy 9/4/19.

## 2019-08-26 NOTE — H&P PST ADULT - HISTORY OF PRESENT ILLNESS
89 yo female with hx of HTN, DMT2, HLD, GERD presents to have PST evaluation with preop dx of malignant neoplasm of unspecified site of left female breast on 5/15/2019. Patient reports, felt a lump on left breast, went for an evaluation, had mammogram done, which revealed "abnormality on left breast", was sent for further evaluation, had biopsy done which revealed "malignancy", now scheduled for left breat lumpectomy on 5/15/2019 89 yo female with hx of HTN, DMT2, HLD, GERD presents to have PST evaluation with preop dx of malignant neoplasm of unspecified site of left female breast on 5/15/2019. Patient reports, felt a lump on left breast, went for an evaluation, had mammogram done, which revealed "abnormality on left breast", was sent for further evaluation, had biopsy done which revealed "malignancy".  S/P  left breat lumpectomy on 5/15/2019.   Pt under tx with Anastrazole.  Pt recently  noticed another left breat lump, scheduled for Left breast lumpectomy 9/4/19. 89 yo female with hx of HTN, DMT2, HLD, GERD with hx of malignant neoplasm left female breast S/P  left breast lumpectomy on 5/15/2019. Pt under tx with Anastrazole.  Pt recently  noticed another left breat lump, scheduled for Left breast lumpectomy 9/4/19.

## 2019-08-26 NOTE — H&P PST ADULT - SKIN/BREAST COMMENTS
Patient reports, felt a lump on left breast, went for an evaluation, had mammogram done, which revealed "abnormality on left breast", was sent for further evaluation, had biopsy done which revealed "malignancy".  S/P  left breat lumpectomy on 5/15/2019. Patient reports, felt a lump on left breast, went for an evaluation, had mammogram done, which revealed "abnormality on left breast", was sent for further evaluation, had biopsy done which revealed "malignancy".  S/P  left breat lumpectomy on 5/15/2019.  Family reports incision line oozing at the edge, and she noticed another left breast lump.

## 2019-08-26 NOTE — H&P PST ADULT - NEGATIVE ENMT SYMPTOMS
no throat pain/no dysphagia/no nasal discharge/no post-nasal discharge/no sinus symptoms/no nasal congestion/no nasal obstruction/no ear pain/no tinnitus

## 2019-08-26 NOTE — H&P PST ADULT - NSICDXPASTSURGICALHX_GEN_ALL_CORE_FT
PAST SURGICAL HISTORY:  S/P cholecystectomy 2016    S/P hysterectomy at age 40 PAST SURGICAL HISTORY:  H/O lumpectomy left breast 5/2019    S/P cholecystectomy 2016    S/P hysterectomy at age 40

## 2019-08-26 NOTE — H&P PST ADULT - NSICDXPROBLEM_GEN_ALL_CORE_FT
PROBLEM DIAGNOSES  Problem: Left breast lump  Assessment and Plan:  Left breast lumpectomy 9/4/19.   CBC CMP HgbA1C   EKG on chart  Preop instructions and antibacterial soap given and explained (verbal and written), with teach back.   Pt reports she was seen by PMD 2 weeks ago for pre-op eval, requested on chart.  Mets<4 no CP, no dyspnea, no palpitations.  s/p lumpectomy 5/2019.  Pt reports she had cardiology eval 2017, requested on chart. No change in status since that time per pt and family. Discussed with Dr. Radford.      Problem: Diabetes mellitus  Assessment and Plan: Pt was advised by PMD to take half of her night time and morning doses of Basaglar insulin.  Accuchek on admission

## 2019-08-26 NOTE — H&P PST ADULT - OTHER CARE PROVIDERS
evaluated by cardiologist in 1/2019 - unable to recall the name of cardiologist evaluated by cardiologist in 1/2019 - Dr. Roche.  PMD has evaluation and echo evaluated by cardiologist in 1/2017 - Dr. Roche.  PMD has evaluation and echo

## 2019-08-26 NOTE — H&P PST ADULT - ACTIVITY
ambulate with a walker at home, adl, on w/c when goes to outside ambulate with a walker at home,  Uses w/c when goes to outside

## 2019-08-26 NOTE — H&P PST ADULT - NSICDXPASTMEDICALHX_GEN_ALL_CORE_FT
PAST MEDICAL HISTORY:  DM (diabetes mellitus) type 2    H/O gastroesophageal reflux (GERD)     Hyperlipidemia     Hypertension     Kidney disease pt states, has 30 % kidney function - denies having nephrologist PAST MEDICAL HISTORY:  DM (diabetes mellitus) type 2    H/O gastroesophageal reflux (GERD)     Hyperlipidemia     Hypertension     Kidney disease pt states, has 30 % kidney function - denies having nephrologist. Pt reports elevated K+ last year, normal level mainained with diet restrictions PAST MEDICAL HISTORY:  DM (diabetes mellitus) type 2    H/O gastroesophageal reflux (GERD)     Hyperlipidemia     Hypertension     Kidney disease pt states, has 30 % kidney function - denies having nephrologist. Pt reports elevated K+ last year, normal level maintained with diet restrictions

## 2019-08-26 NOTE — H&P PST ADULT - CARDIOVASCULAR COMMENTS
pt states, evaluated by cardiologist in 1/2019 - unable to recall the name of doctor pt states, evaluated by cardiologist in 1/2017 -Dr Roche.  PMD has eval and echo per family

## 2019-08-26 NOTE — H&P PST ADULT - NEGATIVE GENERAL GENITOURINARY SYMPTOMS
no dysuria/no gas in urine/no urinary hesitancy/no urine discoloration/no bladder infections/no hematuria/normal urinary frequency

## 2019-08-27 PROBLEM — N28.9 DISORDER OF KIDNEY AND URETER, UNSPECIFIED: Chronic | Status: ACTIVE | Noted: 2019-05-02

## 2019-09-03 ENCOUNTER — TRANSCRIPTION ENCOUNTER (OUTPATIENT)
Age: 84
End: 2019-09-03

## 2019-09-03 NOTE — ASU PATIENT PROFILE, ADULT - PMH
DM (diabetes mellitus)  type 2  H/O gastroesophageal reflux (GERD)    Hyperlipidemia    Hypertension    Kidney disease  pt states, has 30 % kidney function - denies having nephrologist. Pt reports elevated K+ last year, normal level maintained with diet restrictions

## 2019-09-04 ENCOUNTER — RESULT REVIEW (OUTPATIENT)
Age: 84
End: 2019-09-04

## 2019-09-04 ENCOUNTER — APPOINTMENT (OUTPATIENT)
Dept: SURGICAL ONCOLOGY | Facility: HOSPITAL | Age: 84
End: 2019-09-04

## 2019-09-04 ENCOUNTER — INPATIENT (INPATIENT)
Facility: HOSPITAL | Age: 84
LOS: 0 days | Discharge: HOME | End: 2019-09-05
Attending: SURGERY | Admitting: SURGERY
Payer: MEDICAID

## 2019-09-04 VITALS
RESPIRATION RATE: 14 BRPM | OXYGEN SATURATION: 98 % | DIASTOLIC BLOOD PRESSURE: 42 MMHG | SYSTOLIC BLOOD PRESSURE: 184 MMHG | HEIGHT: 63 IN | TEMPERATURE: 98 F | HEART RATE: 68 BPM | WEIGHT: 147.05 LBS

## 2019-09-04 DIAGNOSIS — Z90.49 ACQUIRED ABSENCE OF OTHER SPECIFIED PARTS OF DIGESTIVE TRACT: Chronic | ICD-10-CM

## 2019-09-04 DIAGNOSIS — C50.912 MALIGNANT NEOPLASM OF UNSPECIFIED SITE OF LEFT FEMALE BREAST: ICD-10-CM

## 2019-09-04 DIAGNOSIS — Z98.890 OTHER SPECIFIED POSTPROCEDURAL STATES: Chronic | ICD-10-CM

## 2019-09-04 DIAGNOSIS — Z90.710 ACQUIRED ABSENCE OF BOTH CERVIX AND UTERUS: Chronic | ICD-10-CM

## 2019-09-04 LAB — GLUCOSE BLDC GLUCOMTR-MCNC: 204 MG/DL — HIGH (ref 70–99)

## 2019-09-04 PROCEDURE — 88307 TISSUE EXAM BY PATHOLOGIST: CPT | Mod: 26

## 2019-09-04 PROCEDURE — 88360 TUMOR IMMUNOHISTOCHEM/MANUAL: CPT | Mod: 26

## 2019-09-04 PROCEDURE — 19301 PARTIAL MASTECTOMY: CPT | Mod: LT

## 2019-09-04 RX ORDER — DEXTROSE 50 % IN WATER 50 %
25 SYRINGE (ML) INTRAVENOUS ONCE
Refills: 0 | Status: DISCONTINUED | OUTPATIENT
Start: 2019-09-04 | End: 2019-09-05

## 2019-09-04 RX ORDER — HYDRALAZINE HCL 50 MG
100 TABLET ORAL
Refills: 0 | Status: DISCONTINUED | OUTPATIENT
Start: 2019-09-04 | End: 2019-09-05

## 2019-09-04 RX ORDER — SODIUM CHLORIDE 9 MG/ML
1000 INJECTION INTRAMUSCULAR; INTRAVENOUS; SUBCUTANEOUS
Refills: 0 | Status: DISCONTINUED | OUTPATIENT
Start: 2019-09-04 | End: 2019-09-05

## 2019-09-04 RX ORDER — FENTANYL CITRATE 50 UG/ML
20 INJECTION INTRAVENOUS
Refills: 0 | Status: DISCONTINUED | OUTPATIENT
Start: 2019-09-04 | End: 2019-09-04

## 2019-09-04 RX ORDER — FENTANYL CITRATE 50 UG/ML
40 INJECTION INTRAVENOUS
Refills: 0 | Status: DISCONTINUED | OUTPATIENT
Start: 2019-09-04 | End: 2019-09-04

## 2019-09-04 RX ORDER — HYDRALAZINE HCL 50 MG
100 TABLET ORAL DAILY
Refills: 0 | Status: DISCONTINUED | OUTPATIENT
Start: 2019-09-04 | End: 2019-09-04

## 2019-09-04 RX ORDER — ACETAMINOPHEN 500 MG
650 TABLET ORAL EVERY 6 HOURS
Refills: 0 | Status: DISCONTINUED | OUTPATIENT
Start: 2019-09-04 | End: 2019-09-05

## 2019-09-04 RX ORDER — METOPROLOL TARTRATE 50 MG
50 TABLET ORAL DAILY
Refills: 0 | Status: DISCONTINUED | OUTPATIENT
Start: 2019-09-04 | End: 2019-09-05

## 2019-09-04 RX ORDER — DEXTROSE 50 % IN WATER 50 %
15 SYRINGE (ML) INTRAVENOUS ONCE
Refills: 0 | Status: DISCONTINUED | OUTPATIENT
Start: 2019-09-04 | End: 2019-09-05

## 2019-09-04 RX ORDER — DEXTROSE 50 % IN WATER 50 %
12.5 SYRINGE (ML) INTRAVENOUS ONCE
Refills: 0 | Status: DISCONTINUED | OUTPATIENT
Start: 2019-09-04 | End: 2019-09-05

## 2019-09-04 RX ORDER — OXYCODONE HYDROCHLORIDE 5 MG/1
5 TABLET ORAL EVERY 4 HOURS
Refills: 0 | Status: DISCONTINUED | OUTPATIENT
Start: 2019-09-04 | End: 2019-09-05

## 2019-09-04 RX ORDER — INFLUENZA VIRUS VACCINE 15; 15; 15; 15 UG/.5ML; UG/.5ML; UG/.5ML; UG/.5ML
0.5 SUSPENSION INTRAMUSCULAR ONCE
Refills: 0 | Status: COMPLETED | OUTPATIENT
Start: 2019-09-04 | End: 2019-09-04

## 2019-09-04 RX ORDER — SODIUM CHLORIDE 9 MG/ML
1000 INJECTION, SOLUTION INTRAVENOUS
Refills: 0 | Status: DISCONTINUED | OUTPATIENT
Start: 2019-09-04 | End: 2019-09-05

## 2019-09-04 RX ORDER — HEPARIN SODIUM 5000 [USP'U]/ML
5000 INJECTION INTRAVENOUS; SUBCUTANEOUS EVERY 12 HOURS
Refills: 0 | Status: DISCONTINUED | OUTPATIENT
Start: 2019-09-04 | End: 2019-09-05

## 2019-09-04 RX ORDER — SODIUM CHLORIDE 9 MG/ML
1000 INJECTION INTRAMUSCULAR; INTRAVENOUS; SUBCUTANEOUS
Refills: 0 | Status: DISCONTINUED | OUTPATIENT
Start: 2019-09-04 | End: 2019-09-04

## 2019-09-04 RX ORDER — DEXTROSE MONOHYDRATE, SODIUM CHLORIDE, AND POTASSIUM CHLORIDE 50; .745; 4.5 G/1000ML; G/1000ML; G/1000ML
1000 INJECTION, SOLUTION INTRAVENOUS
Refills: 0 | Status: DISCONTINUED | OUTPATIENT
Start: 2019-09-04 | End: 2019-09-04

## 2019-09-04 RX ORDER — ANASTROZOLE 1 MG/1
1 TABLET ORAL DAILY
Refills: 0 | Status: DISCONTINUED | OUTPATIENT
Start: 2019-09-04 | End: 2019-09-05

## 2019-09-04 RX ORDER — INSULIN LISPRO 100/ML
VIAL (ML) SUBCUTANEOUS
Refills: 0 | Status: DISCONTINUED | OUTPATIENT
Start: 2019-09-04 | End: 2019-09-05

## 2019-09-04 RX ORDER — GLUCAGON INJECTION, SOLUTION 0.5 MG/.1ML
1 INJECTION, SOLUTION SUBCUTANEOUS ONCE
Refills: 0 | Status: DISCONTINUED | OUTPATIENT
Start: 2019-09-04 | End: 2019-09-05

## 2019-09-04 RX ADMIN — Medication 1: at 17:54

## 2019-09-04 RX ADMIN — FENTANYL CITRATE 20 MICROGRAM(S): 50 INJECTION INTRAVENOUS at 17:15

## 2019-09-04 RX ADMIN — Medication 650 MILLIGRAM(S): at 17:39

## 2019-09-04 RX ADMIN — SODIUM CHLORIDE 50 MILLILITER(S): 9 INJECTION INTRAMUSCULAR; INTRAVENOUS; SUBCUTANEOUS at 19:31

## 2019-09-04 RX ADMIN — FENTANYL CITRATE 20 MICROGRAM(S): 50 INJECTION INTRAVENOUS at 16:30

## 2019-09-04 RX ADMIN — Medication 650 MILLIGRAM(S): at 18:15

## 2019-09-04 RX ADMIN — ANASTROZOLE 1 MILLIGRAM(S): 1 TABLET ORAL at 23:37

## 2019-09-04 NOTE — CHART NOTE - NSCHARTNOTEFT_GEN_A_CORE
STATUS POST:  left breast lumpectomy    POST OPERATIVE DAY #: 0    SUBJECTIVE: Pt seen at bedside, patient does not complain of any pain/ Denies N/V SOB and chest pain      Vital Signs Last 24 Hrs  T(C): 36.4 (04 Sep 2019 20:30), Max: 36.7 (04 Sep 2019 15:40)  T(F): 97.5 (04 Sep 2019 20:30), Max: 98.1 (04 Sep 2019 15:40)  HR: 68 (04 Sep 2019 20:30) (63 - 74)  BP: 167/55 (04 Sep 2019 20:30) (134/50 - 184/42)  BP(mean): 85 (04 Sep 2019 20:30) (67 - 113)  RR: 14 (04 Sep 2019 20:30) (14 - 19)  SpO2: 97% (04 Sep 2019 20:30) (95% - 100%)  I&O's Summary    04 Sep 2019 07:01  -  04 Sep 2019 20:45  --------------------------------------------------------  IN: 600 mL / OUT: 150 mL / NET: 450 mL      I&O's Detail    04 Sep 2019 07:01  -  04 Sep 2019 20:45  --------------------------------------------------------  IN:    Oral Fluid: 400 mL    sodium chloride 0.9%.: 200 mL  Total IN: 600 mL    OUT:    Voided: 150 mL  Total OUT: 150 mL    Total NET: 450 mL          MEDICATIONS  (STANDING):  acetaminophen    Suspension .. 650 milliGRAM(s) Oral every 6 hours  dextrose 5%. 1000 milliLiter(s) (50 mL/Hr) IV Continuous <Continuous>  dextrose 50% Injectable 25 Gram(s) IV Push once  dextrose 50% Injectable 12.5 Gram(s) IV Push once  dextrose 50% Injectable 25 Gram(s) IV Push once  heparin  Injectable 5000 Unit(s) SubCutaneous every 12 hours  hydrALAZINE 100 milliGRAM(s) Oral daily  insulin lispro (HumaLOG) corrective regimen sliding scale   SubCutaneous three times a day before meals  metoprolol succinate ER 50 milliGRAM(s) Oral daily  sodium chloride 0.9%. 1000 milliLiter(s) (50 mL/Hr) IV Continuous <Continuous>    MEDICATIONS  (PRN):  dextrose 40% Gel 15 Gram(s) Oral once PRN Blood Glucose LESS THAN 70 milliGRAM(s)/deciliter  fentaNYL    Injectable 40 MICROGram(s) IV Push every 5 minutes PRN Severe Pain (7 - 10)  fentaNYL    Injectable 20 MICROGram(s) IV Push every 5 minutes PRN Moderate Pain (4 - 6)  glucagon  Injectable 1 milliGRAM(s) IntraMuscular once PRN Glucose LESS THAN 70 milligrams/deciliter  oxyCODONE    IR 5 milliGRAM(s) Oral every 4 hours PRN Moderate Pain (4 - 6)      LABS:                RADIOLOGY & ADDITIONAL STUDIES:    PHYSICAL EXAM:      Constitutional: AOx3 NAD    Eyes: Non icteric     Breasts: Left breast dressing c/d/i, no signs of erythema or active bleeding    Respiratory: Breathing comfortably     Gastrointestinal: soft, non tender non distended    Extremities: FROM    Neurological: No focal deficits          A/P: 89y Female s/p   - Diet: Pureed  - Activity: Ambulate as tolerated  - Labs: F/u AM labs  - Pain medication  - DVT ppx

## 2019-09-05 ENCOUNTER — TRANSCRIPTION ENCOUNTER (OUTPATIENT)
Age: 84
End: 2019-09-05

## 2019-09-05 VITALS
TEMPERATURE: 98 F | SYSTOLIC BLOOD PRESSURE: 132 MMHG | OXYGEN SATURATION: 100 % | HEART RATE: 58 BPM | DIASTOLIC BLOOD PRESSURE: 40 MMHG | RESPIRATION RATE: 18 BRPM

## 2019-09-05 LAB
GLUCOSE BLDC GLUCOMTR-MCNC: 219 MG/DL — HIGH (ref 70–99)
GLUCOSE BLDC GLUCOMTR-MCNC: 230 MG/DL — HIGH (ref 70–99)

## 2019-09-05 RX ORDER — DOCUSATE SODIUM 100 MG
100 CAPSULE ORAL THREE TIMES A DAY
Refills: 0 | Status: DISCONTINUED | OUTPATIENT
Start: 2019-09-05 | End: 2019-09-05

## 2019-09-05 RX ORDER — SENNA PLUS 8.6 MG/1
2 TABLET ORAL AT BEDTIME
Refills: 0 | Status: DISCONTINUED | OUTPATIENT
Start: 2019-09-05 | End: 2019-09-05

## 2019-09-05 RX ORDER — ANASTROZOLE 1 MG/1
1 TABLET ORAL
Qty: 0 | Refills: 0 | DISCHARGE
Start: 2019-09-05

## 2019-09-05 RX ORDER — OXYCODONE HYDROCHLORIDE 5 MG/1
1 TABLET ORAL
Qty: 8 | Refills: 0
Start: 2019-09-05 | End: 2019-09-06

## 2019-09-05 RX ORDER — SENNA PLUS 8.6 MG/1
2 TABLET ORAL
Qty: 0 | Refills: 0 | DISCHARGE
Start: 2019-09-05

## 2019-09-05 RX ADMIN — Medication 100 MILLIGRAM(S): at 12:06

## 2019-09-05 RX ADMIN — Medication 650 MILLIGRAM(S): at 05:52

## 2019-09-05 RX ADMIN — Medication 650 MILLIGRAM(S): at 06:30

## 2019-09-05 RX ADMIN — Medication 2: at 08:20

## 2019-09-05 RX ADMIN — HEPARIN SODIUM 5000 UNIT(S): 5000 INJECTION INTRAVENOUS; SUBCUTANEOUS at 05:52

## 2019-09-05 RX ADMIN — Medication 650 MILLIGRAM(S): at 12:05

## 2019-09-05 RX ADMIN — SODIUM CHLORIDE 50 MILLILITER(S): 9 INJECTION INTRAMUSCULAR; INTRAVENOUS; SUBCUTANEOUS at 05:55

## 2019-09-05 NOTE — DISCHARGE NOTE PROVIDER - HOSPITAL COURSE
89 yo female with hx of HTN, DMT2, HLD, GERD with hx of malignant neoplasm left female breast S/P  left breast lumpectomy on 5/15/2019. Pt under tx with Anastrazole.  Pt recently  noticed another left breast lump, scheduled for Left breast lumpectomy 9/4/19.         Patient did well post operatively.  Pain is well controlled.        Patient is tolerating diet and voiding without difficulty.  She uses a walker for ambulation at home.        Patient is stable for discharge home.

## 2019-09-05 NOTE — DISCHARGE NOTE PROVIDER - CARE PROVIDER_API CALL
Elver Sharif)  Surgery  [address] [place] [address] [id number]  Phone: [phone]  Fax: [phone]  Follow Up Time: 1 week

## 2019-09-05 NOTE — DISCHARGE NOTE NURSING/CASE MANAGEMENT/SOCIAL WORK - PATIENT PORTAL LINK FT
You can access the FollowMyHealth Patient Portal offered by [facility] by registering at the following website: [email or web address]. By joining [facility]’s FollowMyHealth portal, you will also be able to view your health information using other applications (apps) compatible with our system.

## 2019-09-05 NOTE — DISCHARGE NOTE PROVIDER - NSDCCPCAREPLAN_GEN_ALL_CORE_FT
PRINCIPAL DISCHARGE DIAGNOSIS  Diagnosis: S/P lumpectomy, left breast  Assessment and Plan of Treatment:

## 2019-09-05 NOTE — PROGRESS NOTE ADULT - SUBJECTIVE AND OBJECTIVE BOX
SURGERY DAILY PROGRESS NOTE:       SUBJECTIVE/ROS: Patient examined at bedside. Denies any breast pain  Denies nausea, vomiting, chest pain, shortness of breath         MEDICATIONS  (STANDING):  acetaminophen    Suspension .. 650 milliGRAM(s) Oral every 6 hours  anastrozole 1 milliGRAM(s) Oral daily  dextrose 5%. 1000 milliLiter(s) (50 mL/Hr) IV Continuous <Continuous>  dextrose 50% Injectable 25 Gram(s) IV Push once  dextrose 50% Injectable 12.5 Gram(s) IV Push once  dextrose 50% Injectable 25 Gram(s) IV Push once  docusate sodium 100 milliGRAM(s) Oral three times a day  heparin  Injectable 5000 Unit(s) SubCutaneous every 12 hours  hydrALAZINE 100 milliGRAM(s) Oral two times a day  influenza   Vaccine 0.5 milliLiter(s) IntraMuscular once  insulin lispro (HumaLOG) corrective regimen sliding scale   SubCutaneous three times a day before meals  metoprolol succinate ER 50 milliGRAM(s) Oral daily  senna 2 Tablet(s) Oral at bedtime  sodium chloride 0.9%. 1000 milliLiter(s) (50 mL/Hr) IV Continuous <Continuous>    MEDICATIONS  (PRN):  dextrose 40% Gel 15 Gram(s) Oral once PRN Blood Glucose LESS THAN 70 milliGRAM(s)/deciliter  glucagon  Injectable 1 milliGRAM(s) IntraMuscular once PRN Glucose LESS THAN 70 milligrams/deciliter  oxyCODONE    IR 5 milliGRAM(s) Oral every 4 hours PRN Moderate Pain (4 - 6)      OBJECTIVE:    Vital Signs Last 24 Hrs  T(C): 36.7 (05 Sep 2019 05:48), Max: 36.7 (04 Sep 2019 15:40)  T(F): 98.1 (05 Sep 2019 05:48), Max: 98.1 (04 Sep 2019 15:40)  HR: 61 (05 Sep 2019 05:48) (61 - 74)  BP: 152/40 (05 Sep 2019 05:48) (134/50 - 184/42)  BP(mean): 85 (04 Sep 2019 20:30) (67 - 113)  RR: 18 (05 Sep 2019 05:48) (14 - 19)  SpO2: 99% (05 Sep 2019 05:48) (95% - 100%)        I&O's Detail    04 Sep 2019 07:01  -  05 Sep 2019 07:00  --------------------------------------------------------  IN:    Oral Fluid: 500 mL    sodium chloride 0.9%.: 550 mL  Total IN: 1050 mL    OUT:    Voided: 800 mL  Total OUT: 800 mL    Total NET: 250 mL      05 Sep 2019 07:01  -  05 Sep 2019 11:29  --------------------------------------------------------  IN:  Total IN: 0 mL    OUT:    Voided: 350 mL  Total OUT: 350 mL    Total NET: -350 mL          Daily Height in cm: 160.02 (04 Sep 2019 13:30)    Daily     LABS:                        PHYSICAL EXAM:  Constitutional: well developed, well nourished, NAD  Eyes: anicteric  ENMT: normal facies, symmetric  Respiratory: Breathing comfortably   Breast: Dressing c/d/i. incison has no signs of active bleeding or erythema   Gastrointestinal: abdomen soft, nontender, nondistended.   Extremities: FROM, warm  Neurological: intact, non-focal  Psychiatric: oriented x 3; appropriate
Vital Signs Last 24 Hrs  T(C): 36.7 (05 Sep 2019 05:48), Max: 36.7 (04 Sep 2019 15:40)  T(F): 98.1 (05 Sep 2019 05:48), Max: 98.1 (04 Sep 2019 15:40)  HR: 61 (05 Sep 2019 05:48) (61 - 74)  BP: 152/40 (05 Sep 2019 05:48) (134/50 - 184/42)  BP(mean): 85 (04 Sep 2019 20:30) (67 - 113)  RR: 18 (05 Sep 2019 05:48) (14 - 19)  SpO2: 99% (05 Sep 2019 05:48) (95% - 100%)    I&O's Detail    04 Sep 2019 07:01  -  05 Sep 2019 07:00  --------------------------------------------------------  IN:    Oral Fluid: 500 mL    sodium chloride 0.9%.: 550 mL  Total IN: 1050 mL    OUT:    Voided: 800 mL  Total OUT: 800 mL    Total NET: 250 mL      05 Sep 2019 07:01  -  05 Sep 2019 09:54  --------------------------------------------------------  IN:  Total IN: 0 mL    OUT:    Voided: 350 mL  Total OUT: 350 mL    Total NET: -350 mL    Incisional Pain    PE incision CD and I   No hematoma    Imp; S/P left lumpectomy. No contraindication for d/c   f/u 1 week 10d                          PLAN:

## 2019-09-08 LAB
MISCELLANEOUS TEST: NORMAL
PROC NAME: NORMAL

## 2019-09-09 LAB — SURGICAL PATHOLOGY STUDY: SIGNIFICANT CHANGE UP

## 2019-09-13 ENCOUNTER — APPOINTMENT (OUTPATIENT)
Dept: SURGICAL ONCOLOGY | Facility: CLINIC | Age: 84
End: 2019-09-13
Payer: MEDICAID

## 2019-09-13 VITALS
RESPIRATION RATE: 16 BRPM | SYSTOLIC BLOOD PRESSURE: 155 MMHG | WEIGHT: 156 LBS | HEART RATE: 68 BPM | BODY MASS INDEX: 25.07 KG/M2 | DIASTOLIC BLOOD PRESSURE: 55 MMHG | OXYGEN SATURATION: 94 % | HEIGHT: 66 IN

## 2019-09-13 PROCEDURE — 99024 POSTOP FOLLOW-UP VISIT: CPT

## 2019-09-18 ENCOUNTER — APPOINTMENT (OUTPATIENT)
Dept: GERIATRICS | Facility: CLINIC | Age: 84
End: 2019-09-18

## 2019-09-19 ENCOUNTER — APPOINTMENT (OUTPATIENT)
Dept: SURGICAL ONCOLOGY | Facility: CLINIC | Age: 84
End: 2019-09-19
Payer: MEDICAID

## 2019-09-19 ENCOUNTER — APPOINTMENT (OUTPATIENT)
Dept: SURGICAL ONCOLOGY | Facility: HOSPITAL | Age: 84
End: 2019-09-19

## 2019-09-19 VITALS
OXYGEN SATURATION: 97 % | HEIGHT: 66 IN | RESPIRATION RATE: 15 BRPM | HEART RATE: 83 BPM | BODY MASS INDEX: 25.07 KG/M2 | SYSTOLIC BLOOD PRESSURE: 133 MMHG | WEIGHT: 156 LBS | DIASTOLIC BLOOD PRESSURE: 57 MMHG

## 2019-09-19 PROCEDURE — 99024 POSTOP FOLLOW-UP VISIT: CPT

## 2019-09-19 NOTE — PHYSICAL EXAM
[FreeTextEntry1] : I, Felipe Ferrer, was present for the physical exam.\par  [de-identified] : Incision is healing well. No evidence of hematoma, seroma, or infection.

## 2019-09-19 NOTE — ADDENDUM
[FreeTextEntry1] : I, Felipe Ferrer, acted soley as a scribe for Dr. Elver Sharif on 09/13/2019\par

## 2019-09-19 NOTE — ASSESSMENT
[FreeTextEntry1] : Imp:\par 4.5cm infiltrating recurrence removed. Margins negative. Incision healing well\par No evidence of new or recurrent lesions. Breast imaging failed to identify any suspicious lesions. No suspicious lesions on exam \par \par Plan: \par RTO in one week to remove remaining sutures\par F/u with Dr. Sadler to discuss radiation therapy. \par \par \par

## 2019-09-19 NOTE — HISTORY OF PRESENT ILLNESS
[de-identified] : 89 y/o female presents for a post-op visit. She is s/p LT breast lumpectomy performed on 9/4/19. 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 Denies palpable breast masses, nipple discharge, skin changes, inversion of breast pain. Denies constitutional symptoms\par

## 2019-09-26 ENCOUNTER — APPOINTMENT (OUTPATIENT)
Dept: SURGICAL ONCOLOGY | Facility: CLINIC | Age: 84
End: 2019-09-26
Payer: MEDICAID

## 2019-09-26 VITALS
DIASTOLIC BLOOD PRESSURE: 60 MMHG | OXYGEN SATURATION: 97 % | SYSTOLIC BLOOD PRESSURE: 168 MMHG | HEIGHT: 66 IN | RESPIRATION RATE: 15 BRPM | HEART RATE: 74 BPM | BODY MASS INDEX: 25.07 KG/M2 | WEIGHT: 156 LBS

## 2019-09-26 PROCEDURE — 99024 POSTOP FOLLOW-UP VISIT: CPT

## 2019-09-26 NOTE — ASSESSMENT
[FreeTextEntry1] : Imp:\par 4.5cm infiltrating recurrence removed. Margins negative. Incision healing well with 1.5cm wound mid incision with moderate amount of drainage.\par No signs of infection noted\par \par Plan: \par RTO in 1 week for removal of packing.\par Continue to reinforce dressing\par \par \par \par

## 2019-09-26 NOTE — PHYSICAL EXAM
[Normal] : well developed, well nourished, in no acute distress [FreeTextEntry1] : RC present for exam.  [de-identified] : Left breast incision continues to heal well without signs of infection.  Remaining suture removed.  Mid incision with a 1.5cm opening with thick brownish drainage.  Wound packed and 4x4 placed over incision.

## 2019-09-26 NOTE — HISTORY OF PRESENT ILLNESS
[de-identified] : 89 y/o female presents for a post-op visit. She is s/p LT breast lumpectomy performed on 9/4/19. Previously, she has had a FNA performed on 07/30/19 of a mass in LT scar from prior LT breast lumpectomy performed in May 2019.\par \par Pathology 9/4/19\par Final Diagnosis\par Breast, left, lumpectomy\par - Poorly differentiated ductal carcinoma with metaplastic\par features (squamous cell  carcinoma) and focal mucinous\par component.\par \par On 9/19 she presented without complaint and had some sutures removed.  Today she presents for the removal of remaining sutures.  Continues to deny palpable breast masses, nipple discharge, skin changes, inversion of breast pain. Denies constitutional symptoms

## 2019-09-30 ENCOUNTER — APPOINTMENT (OUTPATIENT)
Dept: GERIATRICS | Facility: CLINIC | Age: 84
End: 2019-09-30
Payer: MEDICAID

## 2019-09-30 VITALS
BODY MASS INDEX: 24.59 KG/M2 | HEART RATE: 70 BPM | WEIGHT: 153 LBS | SYSTOLIC BLOOD PRESSURE: 120 MMHG | RESPIRATION RATE: 15 BRPM | DIASTOLIC BLOOD PRESSURE: 70 MMHG | TEMPERATURE: 98.6 F | HEIGHT: 66 IN | OXYGEN SATURATION: 97 %

## 2019-09-30 DIAGNOSIS — F03.90 UNSPECIFIED DEMENTIA W/OUT BEHAVIORAL DISTURBANCE: ICD-10-CM

## 2019-09-30 DIAGNOSIS — S21.002D UNSPECIFIED OPEN WOUND OF LEFT BREAST, SUBSEQUENT ENCOUNTER: ICD-10-CM

## 2019-09-30 DIAGNOSIS — M79.89 OTHER SPECIFIED SOFT TISSUE DISORDERS: ICD-10-CM

## 2019-09-30 DIAGNOSIS — Z09 ENCOUNTER FOR FOLLOW-UP EXAMINATION AFTER COMPLETED TREATMENT FOR CONDITIONS OTHER THAN MALIGNANT NEOPLASM: ICD-10-CM

## 2019-09-30 DIAGNOSIS — K59.09 OTHER CONSTIPATION: ICD-10-CM

## 2019-09-30 DIAGNOSIS — K21.9 GASTRO-ESOPHAGEAL REFLUX DISEASE W/OUT ESOPHAGITIS: ICD-10-CM

## 2019-09-30 DIAGNOSIS — Z13.21 ENCOUNTER FOR SCREENING FOR NUTRITIONAL DISORDER: ICD-10-CM

## 2019-09-30 DIAGNOSIS — Z71.89 OTHER SPECIFIED COUNSELING: ICD-10-CM

## 2019-09-30 DIAGNOSIS — Z13.29 ENCOUNTER FOR SCREENING FOR OTHER SUSPECTED ENDOCRINE DISORDER: ICD-10-CM

## 2019-09-30 DIAGNOSIS — F32.9 MAJOR DEPRESSIVE DISORDER, SINGLE EPISODE, UNSPECIFIED: ICD-10-CM

## 2019-09-30 DIAGNOSIS — Z60.2 PROBLEMS RELATED TO LIVING ALONE: ICD-10-CM

## 2019-09-30 DIAGNOSIS — Z13.31 ENCOUNTER FOR SCREENING FOR DEPRESSION: ICD-10-CM

## 2019-09-30 DIAGNOSIS — E11.9 TYPE 2 DIABETES MELLITUS W/OUT COMPLICATIONS: ICD-10-CM

## 2019-09-30 DIAGNOSIS — Z12.83 ENCOUNTER FOR SCREENING FOR MALIGNANT NEOPLASM OF SKIN: ICD-10-CM

## 2019-09-30 DIAGNOSIS — Z13.820 ENCOUNTER FOR SCREENING FOR OSTEOPOROSIS: ICD-10-CM

## 2019-09-30 PROCEDURE — 99205 OFFICE O/P NEW HI 60 MIN: CPT

## 2019-09-30 RX ORDER — POLYETHYLENE GLYCOL 3350 17 G/17G
17 POWDER, FOR SOLUTION ORAL
Qty: 1 | Refills: 3 | Status: ACTIVE | COMMUNITY
Start: 2019-09-30 | End: 1900-01-01

## 2019-09-30 RX ORDER — ASPIRIN 81 MG
81 TABLET, DELAYED RELEASE (ENTERIC COATED) ORAL DAILY
Refills: 6 | Status: ACTIVE | COMMUNITY

## 2019-09-30 RX ORDER — CHLORHEXIDINE GLUCONATE 4 %
LIQUID (ML) TOPICAL
Refills: 0 | Status: ACTIVE | COMMUNITY
Start: 2019-09-30

## 2019-09-30 RX ORDER — METOPROLOL TARTRATE 25 MG/1
25 TABLET, FILM COATED ORAL DAILY
Refills: 0 | Status: DISCONTINUED | COMMUNITY
End: 2019-09-30

## 2019-09-30 RX ORDER — AMLODIPINE BESYLATE 5 MG/1
TABLET ORAL
Refills: 0 | Status: DISCONTINUED | COMMUNITY
End: 2019-09-30

## 2019-09-30 RX ORDER — DONEPEZIL HYDROCHLORIDE 10 MG/1
10 TABLET ORAL
Qty: 90 | Refills: 1 | Status: ACTIVE | COMMUNITY

## 2019-09-30 RX ORDER — SIMVASTATIN 20 MG/1
20 TABLET, FILM COATED ORAL
Qty: 30 | Refills: 1 | Status: ACTIVE | COMMUNITY

## 2019-09-30 RX ORDER — HYDRALAZINE HYDROCHLORIDE 100 MG/1
100 TABLET ORAL TWICE DAILY
Refills: 0 | Status: ACTIVE | COMMUNITY

## 2019-09-30 RX ORDER — OMEPRAZOLE 20 MG/1
20 CAPSULE, DELAYED RELEASE ORAL DAILY
Qty: 180 | Refills: 0 | Status: ACTIVE | COMMUNITY

## 2019-09-30 RX ORDER — INSULIN GLARGINE 100 [IU]/ML
100 INJECTION, SOLUTION SUBCUTANEOUS
Refills: 0 | Status: ACTIVE | COMMUNITY

## 2019-09-30 RX ORDER — METOPROLOL SUCCINATE 25 MG/1
25 TABLET, EXTENDED RELEASE ORAL DAILY
Refills: 0 | Status: ACTIVE | COMMUNITY
Start: 2019-09-30

## 2019-09-30 RX ORDER — DOCUSATE SODIUM 100 MG
100 TABLET ORAL 3 TIMES DAILY
Refills: 0 | Status: ACTIVE | COMMUNITY
Start: 2019-09-30

## 2019-09-30 SDOH — SOCIAL STABILITY - SOCIAL INSECURITY: PROBLEMS RELATED TO LIVING ALONE: Z60.2

## 2019-10-01 NOTE — PHYSICAL EXAM
[Normal] : well developed, well nourished, in no acute distress [de-identified] : Left breast incision continues to heal without signs of infection.  Center of incision with a 5mm opening with serous drainage.  3 sutures removed. [FreeTextEntry1] : RC present for exam.

## 2019-10-01 NOTE — ASSESSMENT
[FreeTextEntry1] : Imp:\par 4.5cm infiltrating recurrence removed. Margins negative.\par Incision continues to healing well.  Small opening in center in incision with serous drainage.\par \par Plan: \par RTO in one week to removal of remaining sutures\par F/u with Dr. Sadler to discuss radiation therapy. \par \par \par

## 2019-10-01 NOTE — REASON FOR VISIT
[Breast Cancer] : breast cancer [Post-Op] : a post-op for [FreeTextEntry2] : s/p left breast lumpectomy on 9/4/19

## 2019-10-01 NOTE — HISTORY OF PRESENT ILLNESS
[de-identified] : 89 y/o female presents for a post-op visit. She is s/p LT breast lumpectomy performed on 9/4/19. 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  Denies post op fevers.

## 2019-10-04 ENCOUNTER — OUTPATIENT (OUTPATIENT)
Dept: OUTPATIENT SERVICES | Facility: HOSPITAL | Age: 84
LOS: 1 days | Discharge: HOME | End: 2019-10-04

## 2019-10-04 ENCOUNTER — APPOINTMENT (OUTPATIENT)
Dept: SURGICAL ONCOLOGY | Facility: CLINIC | Age: 84
End: 2019-10-04
Payer: MEDICAID

## 2019-10-04 ENCOUNTER — APPOINTMENT (OUTPATIENT)
Dept: HEMATOLOGY ONCOLOGY | Facility: CLINIC | Age: 84
End: 2019-10-04
Payer: MEDICAID

## 2019-10-04 VITALS
TEMPERATURE: 98.2 F | SYSTOLIC BLOOD PRESSURE: 158 MMHG | DIASTOLIC BLOOD PRESSURE: 61 MMHG | RESPIRATION RATE: 17 BRPM | WEIGHT: 153 LBS | BODY MASS INDEX: 24.59 KG/M2 | HEART RATE: 72 BPM | HEIGHT: 66 IN | OXYGEN SATURATION: 94 %

## 2019-10-04 VITALS — DIASTOLIC BLOOD PRESSURE: 68 MMHG | SYSTOLIC BLOOD PRESSURE: 146 MMHG

## 2019-10-04 DIAGNOSIS — Z90.710 ACQUIRED ABSENCE OF BOTH CERVIX AND UTERUS: Chronic | ICD-10-CM

## 2019-10-04 DIAGNOSIS — Z98.890 OTHER SPECIFIED POSTPROCEDURAL STATES: Chronic | ICD-10-CM

## 2019-10-04 DIAGNOSIS — C50.919 MALIGNANT NEOPLASM OF UNSPECIFIED SITE OF UNSPECIFIED FEMALE BREAST: ICD-10-CM

## 2019-10-04 DIAGNOSIS — Z90.49 ACQUIRED ABSENCE OF OTHER SPECIFIED PARTS OF DIGESTIVE TRACT: Chronic | ICD-10-CM

## 2019-10-04 PROCEDURE — 99024 POSTOP FOLLOW-UP VISIT: CPT

## 2019-10-04 PROCEDURE — 99214 OFFICE O/P EST MOD 30 MIN: CPT

## 2019-10-04 NOTE — HISTORY OF PRESENT ILLNESS
[de-identified] : 89 y/o female presents for a post-op visit. She is s/p LT breast lumpectomy performed on 9/4/19. Previously, she has had a FNA performed on 07/30/19 of a mass in LT scar from prior LT breast lumpectomy performed in May 2019. 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 \par  Today she presents for the removal of remaining sutures.\par   Continues to deny palpable breast masses, nipple discharge, skin changes, inversion of breast pain. Denies constitutional symptoms

## 2019-10-04 NOTE — ASSESSMENT
[FreeTextEntry1] : Opening 5mm dehiscence with small tract. no obvious purulence or drainage or infection. Some lateral swelling with erythema possibly related to a small seroma.\par Pt is seeing medical oncologist today. \par Family understands that they should consider radiation as well. \par Continue with the 7 days of the prescribed antibiotics\par Will therefore have returned in 2 weeks on 10/18/19.\par \par \par \par \par

## 2019-10-04 NOTE — ADDENDUM
[FreeTextEntry1] : I, Felipe Ferrer, acted soley as a scribe for Dr. Elver Sharif on 10/04/2019\par

## 2019-10-04 NOTE — HISTORY OF PRESENT ILLNESS
[Disease: _____________________] : Disease: [unfilled] [T: ___] : T[unfilled] [N: ___] : N[unfilled] [AJCC Stage: ____] : AJCC Stage: [unfilled] [de-identified] : Age 89: Left breast cancer\par Palpable breast mass on exam and had mammogram/ sonogram on 3/25/19 which showed large rounded mass (6.3 x 5.4 cm) in the outer retroareolar L breast 1:00. She had left breast biopsy on 4/1/19 which showed poorly differentiated carcinoma that was ER 25%, DC 0%, [id number] nonamplified. She saw Dr Sharif and underwent lumpectomy on 5/15/19. 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 She had recurrent left mass that was resected on 9/4/19 which showed poorly differentiated ductal carcinoma with metaplastic features 4.5 cm. She was started on anastrozole also in September.  [de-identified] : poorly differentiated ER 25%, [place] 0%, [id number] nonamplified  [de-identified] : anastrozole 9/2019 to present  [de-identified] : She has not noticed any new hot flashes or GI upset on anastrozole. She is taking medication 4 times / week. Her family has seen her nap more but has not noticed anything else. She has redness over the left lumpectomy site: she has ulceration over the chest wall. She is currently taking antibiotics for 3 days and will continue with antibiotics. They are worried about red lump next to the lumpectomy site.

## 2019-10-04 NOTE — PHYSICAL EXAM
[Restricted in physically strenuous activity but ambulatory and able to carry out work of a light or sedentary nature] : Status 1- Restricted in physically strenuous activity but ambulatory and able to carry out work of a light or sedentary nature, e.g., light house work, office work [Normal] : normal appearance, no rash, nodules, vesicles, ulcers, erythema [de-identified] : seen in wheelchair  [de-identified] : lumpectomy site with 1.5 cm open wound with clear liquid; mild erythema adjacent to lumpectomy site: 10 cm; with mild fluctuance

## 2019-10-04 NOTE — REASON FOR VISIT
[Follow-Up Visit] : a follow-up [Family Member] : family member [FreeTextEntry2] : follow up for breast cancer s/p lumpectomy for recurrence

## 2019-10-04 NOTE — PHYSICAL EXAM
[FreeTextEntry1] : I, Felipe Ferrer, was present for the physical exam.\par  [de-identified] : Opening 5mm dehiscence with small tract. no obvious purulence or drainage or infection. Some lateral swelling with erythema possibly related to a small seroma.

## 2019-10-04 NOTE — ASSESSMENT
[FreeTextEntry1] : She is a 89 y/o  F with Stage II B left breast cancer s/p left lumpectomy. She had recurrent disease over the course of 4 months and currently mass has been resected. She is currently on antibiotics to help with healing and for skin infection. We reviewed increasing anastrozole to once a day. We reviewed radiation oncology evaluation once infection and skin wound has healed. Next follow up in 2 weeks.

## 2019-10-18 ENCOUNTER — APPOINTMENT (OUTPATIENT)
Dept: SURGICAL ONCOLOGY | Facility: CLINIC | Age: 84
End: 2019-10-18
Payer: MEDICAID

## 2019-10-18 ENCOUNTER — APPOINTMENT (OUTPATIENT)
Dept: HEMATOLOGY ONCOLOGY | Facility: CLINIC | Age: 84
End: 2019-10-18
Payer: MEDICAID

## 2019-10-18 VITALS
RESPIRATION RATE: 17 BRPM | DIASTOLIC BLOOD PRESSURE: 64 MMHG | OXYGEN SATURATION: 95 % | SYSTOLIC BLOOD PRESSURE: 151 MMHG | BODY MASS INDEX: 24.59 KG/M2 | WEIGHT: 153 LBS | HEART RATE: 72 BPM | HEIGHT: 66 IN

## 2019-10-18 DIAGNOSIS — Z85.3 ENCOUNTER FOR FOLLOW-UP EXAMINATION AFTER COMPLETED TREATMENT FOR MALIGNANT NEOPLASM: ICD-10-CM

## 2019-10-18 DIAGNOSIS — Z08 ENCOUNTER FOR FOLLOW-UP EXAMINATION AFTER COMPLETED TREATMENT FOR MALIGNANT NEOPLASM: ICD-10-CM

## 2019-10-18 PROCEDURE — 99024 POSTOP FOLLOW-UP VISIT: CPT

## 2019-10-18 PROCEDURE — 99213 OFFICE O/P EST LOW 20 MIN: CPT

## 2019-10-18 NOTE — REASON FOR VISIT
[Follow-Up Visit] : a follow-up [Family Member] : family member [FreeTextEntry2] : follow up for breast cancer s/p completion of oral antibiotics

## 2019-10-18 NOTE — REVIEW OF SYSTEMS
[Chills] : no chills [Fever] : no fever [Fatigue] : fatigue [Night Sweats] : no night sweats [Recent Change In Weight] : ~T no recent weight change [Skin Rash] : no skin rash [Skin Wound] : no skin wound [Negative] : Allergic/Immunologic [FreeTextEntry2] : taking more naps on anastrozole  [de-identified] : left breast swelling

## 2019-10-18 NOTE — ASSESSMENT
[FreeTextEntry1] : She is a 89 y/o  F with Stage II B left breast cancer s/p left lumpectomy. She had recurrent disease over the course of 4 months and currently mass has been resected. She completed antibiotic course with healed breast wound. She is tolerating anastrozole with expected fatigue. We encouraged RT evaluation to have better local control over area. Her family will bring her to see Dr Azevedo. Next follow up in 1 to 2 months.

## 2019-10-18 NOTE — PHYSICAL EXAM
[Normal] : supple, no neck mass and thyroid not enlarged [Normal Neck Lymph Nodes] : normal neck lymph nodes  [Normal Groin Lymph Nodes] : normal groin lymph nodes [Normal] : oriented to person, place and time, with appropriate affect [FreeTextEntry1] : I, Felipe Ferrer, was present for the physical exam.\par  [de-identified] : Small dehiscence has closed. Erythema greatly reduced at site of incision. Mild swelling underneath the LT axillary lymph node. Incision is healing well. No evidence of hematoma, seroma, or infection.

## 2019-10-18 NOTE — ASSESSMENT
[FreeTextEntry1] : Imp:\par Previously seen erythema associated with the incision is reduced significantly. \par There is still some mild swelling observed in the LT axilla. \par Small dehiscence has closed. \par \par Plan:\par RTO in January 2020\par \par

## 2019-10-18 NOTE — HISTORY OF PRESENT ILLNESS
[Disease: _____________________] : Disease: [unfilled] [T: ___] : T[unfilled] [AJCC Stage: ____] : AJCC Stage: [unfilled] [N: ___] : N[unfilled] [de-identified] : poorly differentiated ER 25%, [place] 0%, [id number] nonamplified  [de-identified] : Age 89: Left breast cancer\par Palpable breast mass on exam and had mammogram/ sonogram on 3/25/19 which showed large rounded mass (6.3 x 5.4 cm) in the outer retroareolar L breast 1:00. She had left breast biopsy on 4/1/19 which showed poorly differentiated carcinoma that was ER 25%, [place] 0%, [id number] nonamplified. She saw Dr Sharif and underwent lumpectomy on 5/15/19. 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 She had recurrent left mass that was resected on 9/4/19 which showed poorly differentiated ductal carcinoma with metaplastic features 4.5 cm. She was started on anastrozole also in September.  [de-identified] : She completed antibiotics last week with wound closure. Her family has been massaging area and with decreased fluid over the left lumpectomy site. She is taking anastrozole 6 days out of the week. Denies any new bone pain. Her family is noticing she is taking more naps.  [de-identified] : anastrozole 9/2019 to present

## 2019-10-18 NOTE — HISTORY OF PRESENT ILLNESS
[de-identified] : 89 y/o female presents for a post-op visit. She is s/p LT breast lumpectomy performed on 9/4/19. Previously, she has had a FNA performed on 07/30/19 of a mass in LT scar from prior LT breast lumpectomy performed in May 2019. 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 \par On 10/4/19, she presented for the removal of remaining sutures.\par  \par Today, on 10/18/19, she continues to deny palpable breast masses, nipple discharge, skin changes, inversion of breast pain. Denies constitutional symptoms, but does complain of mild swelling underneath the LT axilla.

## 2019-10-18 NOTE — PHYSICAL EXAM
[Ambulatory and capable of all self care but unable to carry out any work activities] : Status 2- Ambulatory and capable of all self care but unable to carry out any work activities. Up and about more than 50% of waking hours [Normal] : affect appropriate [de-identified] : seen in wheelchair  [de-identified] : lumpectomy site with healed wound; lateral fluid collection improved with resolved erythema; 3 cm area of induration where fluid used to be

## 2019-10-18 NOTE — HISTORY OF PRESENT ILLNESS
[de-identified] : 89 y/o female presents for a f/u visit. She is s/p LT breast lumpectomy performed on 9/4/19. Previously, she has had a FNA performed on 07/30/19 of a mass in LT scar from prior LT breast lumpectomy performed in May 2019. 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 \par On 10/4/19, she  presented  for the removal of remaining sutures.\par Continues to deny palpable breast masses, nipple discharge, skin changes, inversion of breast pain. Denies constitutional symptoms.

## 2019-10-18 NOTE — ADDENDUM
[FreeTextEntry1] : I, Felipe Ferrer, acted soley as a scribe for Dr. Elver Sharif on 10/18/2019\par \par

## 2019-10-23 ENCOUNTER — OUTPATIENT (OUTPATIENT)
Dept: OUTPATIENT SERVICES | Facility: HOSPITAL | Age: 84
LOS: 1 days | Discharge: HOME | End: 2019-10-23

## 2019-10-23 DIAGNOSIS — Z90.49 ACQUIRED ABSENCE OF OTHER SPECIFIED PARTS OF DIGESTIVE TRACT: Chronic | ICD-10-CM

## 2019-10-23 DIAGNOSIS — Z90.710 ACQUIRED ABSENCE OF BOTH CERVIX AND UTERUS: Chronic | ICD-10-CM

## 2019-10-23 DIAGNOSIS — Z98.890 OTHER SPECIFIED POSTPROCEDURAL STATES: Chronic | ICD-10-CM

## 2019-10-28 ENCOUNTER — APPOINTMENT (OUTPATIENT)
Dept: RADIATION ONCOLOGY | Facility: CLINIC | Age: 84
End: 2019-10-28
Payer: MEDICAID

## 2019-10-28 VITALS
HEIGHT: 63 IN | WEIGHT: 153 LBS | RESPIRATION RATE: 15 BRPM | TEMPERATURE: 98.1 F | SYSTOLIC BLOOD PRESSURE: 148 MMHG | HEART RATE: 74 BPM | BODY MASS INDEX: 27.11 KG/M2 | DIASTOLIC BLOOD PRESSURE: 69 MMHG | OXYGEN SATURATION: 95 %

## 2019-10-28 DIAGNOSIS — Z86.79 PERSONAL HISTORY OF OTHER DISEASES OF THE CIRCULATORY SYSTEM: ICD-10-CM

## 2019-10-28 DIAGNOSIS — G30.9 ALZHEIMER'S DISEASE, UNSPECIFIED: ICD-10-CM

## 2019-10-28 DIAGNOSIS — F02.80 ALZHEIMER'S DISEASE, UNSPECIFIED: ICD-10-CM

## 2019-10-28 DIAGNOSIS — Z86.39 PERSONAL HISTORY OF OTHER ENDOCRINE, NUTRITIONAL AND METABOLIC DISEASE: ICD-10-CM

## 2019-10-28 PROCEDURE — 99204 OFFICE O/P NEW MOD 45 MIN: CPT | Mod: GC,25

## 2019-10-28 RX ORDER — 70%ISOPROPYL ALCOHOL 0.7 ML/ML
70 SWAB TOPICAL
Refills: 0 | Status: ACTIVE | COMMUNITY

## 2019-10-29 NOTE — PHYSICAL EXAM
[] : no respiratory distress [Exaggerated Use Of Accessory Muscles For Inspiration] : no accessory muscle use [Affect] : the affect was normal [Not Anxious] : not anxious [Sclera] : the sclera and conjunctiva were normal [Outer Ear] : the ears and nose were normal in appearance [Heart Rate And Rhythm] : heart rate and rhythm were normal [Normal] : no palpable adenopathy [Skin Color & Pigmentation] : normal skin color and pigmentation [No Focal Deficits] : no focal deficits [de-identified] : left breast surgical scars and clean and dry, no discharge, palpable masses in the left lateral chest and axilla [de-identified] : full ROM both UEs

## 2019-10-29 NOTE — END OF VISIT
[>50% of Time Spent on Counseling for ____] : Greater than 50% of the encounter time was spent on counseling for [unfilled] [Time Spent: ___ minutes] : I have spent [unfilled] minutes of face to face time with the patient [] : Resident

## 2019-10-29 NOTE — HISTORY OF PRESENT ILLNESS
[FreeTextEntry1] : Ms. Mancera is a 89 year old woman who presented with a palpable left breast mass in 2019. She had discontinued screening mammography at age 70. Her granddaughter had breast cancer at age 44. \par \par Mammogram and ultrasound on 3/25/19 showed a mass measuring 6.3 x 5.3 and the upper retroareolar region containing coarse calcifications associated with left breast skin thickening and nipple retraction. Ultrasound showed a predominantly cystic mass measuring 5.2 x 4.6 x 5.5 cm at 1:00, 3-4 cm from the nipple, containing some solid elements. There was no evidence of abnormal lymphadenopathy.\par \par 4/1/19: She had left breast biopsy which showed poorly differentiated invasive ductal carcinoma that was ER 25%, [place] 0%, [id number] 2+, nonamplified by FISH.  \par \par 5/15/19. Dr. Sharif performed a lumpectomy and deep margin excision. 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 LVI negative, DCIS present. Margins 0.1CM away from DCIS and IDC.  \par \par In July, a nodule developed along the scar. \par \par A second lumpectomy was performed on 9/4/19 which showed poorly differentiated ductal carcinoma with metaplastic features 4.5 cm. The tumor was 3% ER+, GA and HER2 negative. The tumor extended to and ulcerated the skin. There was no LVI. The margins were negative. \par \par 9/2019: She started anastrozole.

## 2019-10-29 NOTE — LETTER CLOSING
[Consult Closing:] : Thank you for allowing me to participate in the care of this patient.  If you have any questions, please do not hesitate to contact me. [Sincerely yours,] : Sincerely yours, [FreeTextEntry3] : Jessy Azevedo MD\par \par

## 2019-10-29 NOTE — REASON FOR VISIT
[Consideration for Non-Curative Therapy] : consideration for non-curative therapy for [Breast Cancer] : breast cancer [Family Member] : family member

## 2019-10-31 ENCOUNTER — OUTPATIENT (OUTPATIENT)
Dept: OUTPATIENT SERVICES | Facility: HOSPITAL | Age: 84
LOS: 1 days | Discharge: HOME | End: 2019-10-31

## 2019-10-31 DIAGNOSIS — Z98.890 OTHER SPECIFIED POSTPROCEDURAL STATES: Chronic | ICD-10-CM

## 2019-10-31 DIAGNOSIS — Z90.710 ACQUIRED ABSENCE OF BOTH CERVIX AND UTERUS: Chronic | ICD-10-CM

## 2019-10-31 DIAGNOSIS — C50.919 MALIGNANT NEOPLASM OF UNSPECIFIED SITE OF UNSPECIFIED FEMALE BREAST: ICD-10-CM

## 2019-10-31 DIAGNOSIS — Z90.49 ACQUIRED ABSENCE OF OTHER SPECIFIED PARTS OF DIGESTIVE TRACT: Chronic | ICD-10-CM

## 2019-11-03 ENCOUNTER — FORM ENCOUNTER (OUTPATIENT)
Age: 84
End: 2019-11-03

## 2019-11-04 ENCOUNTER — APPOINTMENT (OUTPATIENT)
Dept: ULTRASOUND IMAGING | Facility: IMAGING CENTER | Age: 84
End: 2019-11-04
Payer: MEDICAID

## 2019-11-04 ENCOUNTER — OUTPATIENT (OUTPATIENT)
Dept: OUTPATIENT SERVICES | Facility: HOSPITAL | Age: 84
LOS: 1 days | End: 2019-11-04
Payer: MEDICAID

## 2019-11-04 ENCOUNTER — APPOINTMENT (OUTPATIENT)
Dept: HEMATOLOGY ONCOLOGY | Facility: CLINIC | Age: 84
End: 2019-11-04
Payer: MEDICAID

## 2019-11-04 VITALS
OXYGEN SATURATION: 98 % | RESPIRATION RATE: 16 BRPM | SYSTOLIC BLOOD PRESSURE: 176 MMHG | WEIGHT: 153 LBS | BODY MASS INDEX: 27.1 KG/M2 | HEART RATE: 72 BPM | DIASTOLIC BLOOD PRESSURE: 65 MMHG | TEMPERATURE: 97.5 F

## 2019-11-04 DIAGNOSIS — Z98.890 OTHER SPECIFIED POSTPROCEDURAL STATES: Chronic | ICD-10-CM

## 2019-11-04 DIAGNOSIS — C50.912 MALIGNANT NEOPLASM OF UNSPECIFIED SITE OF LEFT FEMALE BREAST: ICD-10-CM

## 2019-11-04 DIAGNOSIS — Z90.49 ACQUIRED ABSENCE OF OTHER SPECIFIED PARTS OF DIGESTIVE TRACT: Chronic | ICD-10-CM

## 2019-11-04 DIAGNOSIS — Z90.710 ACQUIRED ABSENCE OF BOTH CERVIX AND UTERUS: Chronic | ICD-10-CM

## 2019-11-04 PROCEDURE — 76642 ULTRASOUND BREAST LIMITED: CPT

## 2019-11-04 PROCEDURE — 99213 OFFICE O/P EST LOW 20 MIN: CPT

## 2019-11-04 PROCEDURE — 76642 ULTRASOUND BREAST LIMITED: CPT | Mod: 26,LT

## 2019-11-04 NOTE — REASON FOR VISIT
[Follow-Up Visit] : a follow-up [Family Member] : family member [FreeTextEntry2] : follow up for breast cancer s/p RT evaluation with new L axillary LN

## 2019-11-04 NOTE — ASSESSMENT
[FreeTextEntry1] : She is a 89 y/o  F with Stage II B left breast cancer s/p left lumpectomy. She had recurrent disease within 4 months of her surgery and s/p 2nd lumpectomy on 9/4/19 with new L axillary adenopathy suspicious for recurrence. We reviewed u/s findings and physical exam findings. We reviewed CT of the chest/ abd/ pelvis given Cr Cl of 15 ml/ min and bone scan. We reviewed her course which has been difficult with metaplastic histology. If we prove metastatic disease, we reviewed supportive measures. They are still interested in local therapy if available. Will s/w catracho Vasquez once scan is completed.

## 2019-11-04 NOTE — REVIEW OF SYSTEMS
[Fever] : no fever [Chills] : no chills [Night Sweats] : no night sweats [Fatigue] : fatigue [Recent Change In Weight] : ~T no recent weight change [Easy Bleeding] : no tendency for easy bleeding [Easy Bruising] : no tendency for easy bruising [Swollen Glands] : swollen glands [Negative] : Allergic/Immunologic

## 2019-11-04 NOTE — PHYSICAL EXAM
[Restricted in physically strenuous activity but ambulatory and able to carry out work of a light or sedentary nature] : Status 1- Restricted in physically strenuous activity but ambulatory and able to carry out work of a light or sedentary nature, e.g., light house work, office work [Normal] : affect appropriate [de-identified] : lumpectomy site with healed wound; lateral fluid collection improved with resolved erythema; 3 cm area of induration where fluid used to be  [de-identified] : new mobile 3 cm mass upper outer L axilla; resolution of edema lateral to lumpectomy site

## 2019-11-04 NOTE — HISTORY OF PRESENT ILLNESS
[Disease: _____________________] : Disease: [unfilled] [T: ___] : T[unfilled] [N: ___] : N[unfilled] [AJCC Stage: ____] : AJCC Stage: [unfilled] [de-identified] : Age 89: Left breast cancer\par Palpable breast mass on exam and had mammogram/ sonogram on 3/25/19 which showed large rounded mass (6.3 x 5.4 cm) in the outer retroareolar L breast 1:00. She had left breast biopsy on 4/1/19 which showed poorly differentiated carcinoma that was ER 25%, [place] 0%, [id number] nonamplified. She saw Dr Sharif and underwent lumpectomy on 5/15/19. 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 She had recurrent left mass that was resected on 9/4/19 which showed poorly differentiated ductal carcinoma with metaplastic features 4.5 cm. She was started on anastrozole also in September. She had evaluation for RT to the breast and was found to have palpable axillary LN and referred for axillary u/s.  [de-identified] : poorly differentiated ER 25%, [place] 0%, [id number] nonamplified  [de-identified] : anastrozole 9/2019 to present  [de-identified] : She has mild fatigue and takes nap on anastrozole. Otherwise, family has not noticed any new complaints. She denies any new chest wall pain. Has had resolution of fluid over the lateral lumpectomy site. Has CKD with CrCl of 15 ml/min. Had blood work recently done last month by PCP.

## 2019-11-15 NOTE — PHYSICAL EXAM
Grounding pads were placed on the right hip    [Capable of only limited self care, confined to bed or chair more than 50% of waking hours] : Status 3- Capable of only limited self care, confined to bed or chair more than 50% of waking hours [Normal] : affect appropriate [de-identified] : seen in wheelchair  [de-identified] : seen in wheelchair; hand  4/5 ; BUE proximal 4+/5 and BLE 4/5 [de-identified] : left breast 9 x 10 cm mass 3:00 with mild skin pigment change over mass; no erythema or tenderness on palpation; no L axillary LN palpable

## 2019-12-01 ENCOUNTER — FORM ENCOUNTER (OUTPATIENT)
Age: 84
End: 2019-12-01

## 2019-12-02 ENCOUNTER — APPOINTMENT (OUTPATIENT)
Dept: CT IMAGING | Facility: IMAGING CENTER | Age: 84
End: 2019-12-02
Payer: MEDICAID

## 2019-12-02 ENCOUNTER — OUTPATIENT (OUTPATIENT)
Dept: OUTPATIENT SERVICES | Facility: HOSPITAL | Age: 84
LOS: 1 days | End: 2019-12-02
Payer: MEDICAID

## 2019-12-02 DIAGNOSIS — Z90.49 ACQUIRED ABSENCE OF OTHER SPECIFIED PARTS OF DIGESTIVE TRACT: Chronic | ICD-10-CM

## 2019-12-02 DIAGNOSIS — Z98.890 OTHER SPECIFIED POSTPROCEDURAL STATES: Chronic | ICD-10-CM

## 2019-12-02 DIAGNOSIS — C50.912 MALIGNANT NEOPLASM OF UNSPECIFIED SITE OF LEFT FEMALE BREAST: ICD-10-CM

## 2019-12-02 DIAGNOSIS — N28.9 DISORDER OF KIDNEY AND URETER, UNSPECIFIED: ICD-10-CM

## 2019-12-02 DIAGNOSIS — R59.0 LOCALIZED ENLARGED LYMPH NODES: ICD-10-CM

## 2019-12-02 DIAGNOSIS — Z90.710 ACQUIRED ABSENCE OF BOTH CERVIX AND UTERUS: Chronic | ICD-10-CM

## 2019-12-02 PROCEDURE — 74176 CT ABD & PELVIS W/O CONTRAST: CPT

## 2019-12-02 PROCEDURE — 74176 CT ABD & PELVIS W/O CONTRAST: CPT | Mod: 26

## 2019-12-02 PROCEDURE — 71250 CT THORAX DX C-: CPT

## 2019-12-02 PROCEDURE — 71250 CT THORAX DX C-: CPT | Mod: 26

## 2019-12-03 ENCOUNTER — OUTPATIENT (OUTPATIENT)
Dept: OUTPATIENT SERVICES | Facility: HOSPITAL | Age: 84
LOS: 1 days | Discharge: HOME | End: 2019-12-03

## 2019-12-03 DIAGNOSIS — Z98.890 OTHER SPECIFIED POSTPROCEDURAL STATES: Chronic | ICD-10-CM

## 2019-12-03 DIAGNOSIS — Z90.710 ACQUIRED ABSENCE OF BOTH CERVIX AND UTERUS: Chronic | ICD-10-CM

## 2019-12-03 DIAGNOSIS — Z90.49 ACQUIRED ABSENCE OF OTHER SPECIFIED PARTS OF DIGESTIVE TRACT: Chronic | ICD-10-CM

## 2019-12-03 DIAGNOSIS — C50.919 MALIGNANT NEOPLASM OF UNSPECIFIED SITE OF UNSPECIFIED FEMALE BREAST: ICD-10-CM

## 2019-12-05 ENCOUNTER — APPOINTMENT (OUTPATIENT)
Dept: HEMATOLOGY ONCOLOGY | Facility: CLINIC | Age: 84
End: 2019-12-05
Payer: MEDICAID

## 2019-12-05 VITALS
BODY MASS INDEX: 28.12 KG/M2 | SYSTOLIC BLOOD PRESSURE: 166 MMHG | OXYGEN SATURATION: 95 % | RESPIRATION RATE: 16 BRPM | HEART RATE: 74 BPM | TEMPERATURE: 97.7 F | DIASTOLIC BLOOD PRESSURE: 67 MMHG | WEIGHT: 158.73 LBS

## 2019-12-05 PROCEDURE — 99214 OFFICE O/P EST MOD 30 MIN: CPT

## 2019-12-05 NOTE — ASSESSMENT
[FreeTextEntry1] : She is a 89 y/o  F with Stage II B left breast cancer s/p left lumpectomy. She had recurrent disease within 4 months of her surgery and s/p 2nd lumpectomy on 9/4/19 with new L axillary adenopathy. We reviewed her CT chest/ abd/ pelvis: there is no prior baseline to compare for the 3 pulmonary nodules that are seen on CT. We reviewed this is indeterminate. She is asymptomatic currently. We reviewed pros and cons of local intervention for L axillary adenopathy. Her family will consider brief course of RT to the left axilla. She will follow up with Dr Sharif next week.

## 2019-12-05 NOTE — REASON FOR VISIT
[Follow-Up Visit] : a follow-up [FreeTextEntry2] : follow up for breast cancer: Left axillary adenopathy  [Family Member] : family member

## 2019-12-05 NOTE — HISTORY OF PRESENT ILLNESS
[Disease: _____________________] : Disease: [unfilled] [T: ___] : T[unfilled] [N: ___] : N[unfilled] [AJCC Stage: ____] : AJCC Stage: [unfilled] [de-identified] : poorly differentiated ER 25%, [place] 0%, [id number] nonamplified  [de-identified] : Age 89: Left breast cancer\par Palpable breast mass on exam and had mammogram/ sonogram on 3/25/19 which showed large rounded mass (6.3 x 5.4 cm) in the outer retroareolar L breast 1:00. She had left breast biopsy on 4/1/19 which showed poorly differentiated carcinoma that was ER 25%, [place] 0%, [id number] nonamplified. She saw Dr Sharif and underwent lumpectomy on 5/15/19. 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 She had recurrent left mass that was resected on 9/4/19 which showed poorly differentiated ductal carcinoma with metaplastic features 4.5 cm. She was started on anastrozole also in September. She had evaluation for RT to the breast and was found to have palpable axillary LN and referred for axillary u/s.  [de-identified] : anastrozole 9/2019 to present  [de-identified] : She enjoyed her 89th birthday celebration. She denies any new pain under the L axilla. She has occasional L shoulder pain that is improved by massage: her family massages shoulders twice a day. She denies any new cough or back or abdominal pain. Her family feels she is at her baseline with normal appetite but more fatigue and sleeping more frequently during the day.

## 2019-12-05 NOTE — PHYSICAL EXAM
[Ambulatory and capable of all self care but unable to carry out any work activities] : Status 2- Ambulatory and capable of all self care but unable to carry out any work activities. Up and about more than 50% of waking hours [Normal] : normal appearance, no rash, nodules, vesicles, ulcers, erythema [de-identified] : lumpectomy site; 3 cm axillary LN without any calor or erythema or dimpling of the skin over the breast [de-identified] : new mobile 3 cm mass upper outer L axilla

## 2019-12-05 NOTE — REVIEW OF SYSTEMS
[Easy Bruising] : no tendency for easy bruising [Easy Bleeding] : no tendency for easy bleeding [Swollen Glands] : swollen glands [Negative] : Allergic/Immunologic [de-identified] : left axillary LN

## 2020-01-14 ENCOUNTER — OUTPATIENT (OUTPATIENT)
Dept: OUTPATIENT SERVICES | Facility: HOSPITAL | Age: 85
LOS: 1 days | Discharge: HOME | End: 2020-01-14

## 2020-01-14 DIAGNOSIS — Z98.890 OTHER SPECIFIED POSTPROCEDURAL STATES: Chronic | ICD-10-CM

## 2020-01-14 DIAGNOSIS — Z90.710 ACQUIRED ABSENCE OF BOTH CERVIX AND UTERUS: Chronic | ICD-10-CM

## 2020-01-14 DIAGNOSIS — C50.919 MALIGNANT NEOPLASM OF UNSPECIFIED SITE OF UNSPECIFIED FEMALE BREAST: ICD-10-CM

## 2020-01-14 DIAGNOSIS — Z90.49 ACQUIRED ABSENCE OF OTHER SPECIFIED PARTS OF DIGESTIVE TRACT: Chronic | ICD-10-CM

## 2020-01-16 ENCOUNTER — APPOINTMENT (OUTPATIENT)
Dept: HEMATOLOGY ONCOLOGY | Facility: CLINIC | Age: 85
End: 2020-01-16
Payer: MEDICAID

## 2020-01-16 ENCOUNTER — APPOINTMENT (OUTPATIENT)
Dept: SURGICAL ONCOLOGY | Facility: CLINIC | Age: 85
End: 2020-01-16
Payer: MEDICAID

## 2020-01-16 VITALS
TEMPERATURE: 97.8 F | SYSTOLIC BLOOD PRESSURE: 158 MMHG | RESPIRATION RATE: 16 BRPM | DIASTOLIC BLOOD PRESSURE: 71 MMHG | HEART RATE: 77 BPM | OXYGEN SATURATION: 99 %

## 2020-01-16 DIAGNOSIS — R59.0 LOCALIZED ENLARGED LYMPH NODES: ICD-10-CM

## 2020-01-16 DIAGNOSIS — R91.8 OTHER NONSPECIFIC ABNORMAL FINDING OF LUNG FIELD: ICD-10-CM

## 2020-01-16 DIAGNOSIS — C50.912 MALIGNANT NEOPLASM OF UNSPECIFIED SITE OF LEFT FEMALE BREAST: ICD-10-CM

## 2020-01-16 PROCEDURE — 99213 OFFICE O/P EST LOW 20 MIN: CPT

## 2020-01-16 PROCEDURE — 99214 OFFICE O/P EST MOD 30 MIN: CPT

## 2020-01-16 NOTE — REASON FOR VISIT
[Follow-Up Visit] : a follow-up [Family Member] : family member [FreeTextEntry2] : follow up for breast cancer

## 2020-01-16 NOTE — PHYSICAL EXAM
[Normal] : supple, no neck mass and thyroid not enlarged [Normal Supraclavicular Lymph Nodes] : normal supraclavicular lymph nodes [Normal Neck Lymph Nodes] : normal neck lymph nodes  [Normal] : oriented to person, place and time, with appropriate affect [FreeTextEntry1] : RC present for exam.  [de-identified] : Complete breast exam performed in supine and upright positions. Palpable 6cm left axillary mass.  No palpable breast masses, tenderness, nipple discharge, inversion, deviation or enlarged  or supraclavicular lymph nodes.  [de-identified] : Normal S1, S2. Regular rate and rhythm. [de-identified] : See breast exam for axillary LN [de-identified] : Clear breath sounds bilaterally, normal respiratory effort.

## 2020-01-16 NOTE — PHYSICAL EXAM
[Ambulatory and capable of all self care but unable to carry out any work activities] : Status 2- Ambulatory and capable of all self care but unable to carry out any work activities. Up and about more than 50% of waking hours [Normal] : affect appropriate [de-identified] : lumpectomy site; 6 cm axillary LN without any calor or erythema or dimpling of the skin over the breast [de-identified] : new mobile 6 cm mass upper outer L axilla

## 2020-01-16 NOTE — REVIEW OF SYSTEMS
[Fever] : no fever [Chills] : no chills [Night Sweats] : no night sweats [Fatigue] : fatigue [Recent Change In Weight] : ~T no recent weight change [Easy Bleeding] : no tendency for easy bleeding [Swollen Glands] : swollen glands [Negative] : Allergic/Immunologic

## 2020-01-16 NOTE — HISTORY OF PRESENT ILLNESS
[de-identified] : 89 y/o female presents for a follow up exam. She is s/p LT breast lumpectomy performed on 9/4/19. Previously, she has had a FNA performed on 07/30/19 of a mass in LT scar from prior LT breast lumpectomy performed in May 2019. 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    It was recommended that she undergo XRT however she states that she does not plan on undergoing this procedure.  \par Today she denies palpable breast masses, nipple discharge, skin changes, inversion of breast pain. She states that she is not experiencing any discomfort in her left axilla from the mass.

## 2020-01-16 NOTE — ASSESSMENT
[FreeTextEntry1] : Imp:\par History of left breast cancer with recurrence. Last lumpectomy Sept 2019\par Now with 6cm mass left axilla (suspicious appearing LNs on sonogram Nov 2019)\par \par Plan:\par At this time I recommended going forth with radiation therapy for the left axillary adenopathy\par Continued follow up with Dr. Donny Sadler (med/onc) - has appt today\par RTO in 2 months for repeat exam\par \par

## 2020-01-16 NOTE — ASSESSMENT
[FreeTextEntry1] : She is a 89 y/o  F with Stage II B left breast cancer s/p left lumpectomy. She had recurrent disease within 4 months of her surgery and s/p 2nd lumpectomy on 9/4/19 with new L axillary adenopathy. We reviewed her wishes. She currently is asymptomatic and would like to have conservative management. She will continue with surveillance and anastrozole. Her family is agreeable to follow her wishes. She understands if any pain or discomfort, she will reconsider palliative treatment options. She will be following up in March.

## 2020-01-16 NOTE — HISTORY OF PRESENT ILLNESS
[Disease: _____________________] : Disease: [unfilled] [T: ___] : T[unfilled] [N: ___] : N[unfilled] [AJCC Stage: ____] : AJCC Stage: [unfilled] [de-identified] : Age 89: Left breast cancer\par Palpable breast mass on exam and had mammogram/ sonogram on 3/25/19 which showed large rounded mass (6.3 x 5.4 cm) in the outer retroareolar L breast 1:00. She had left breast biopsy on 4/1/19 which showed poorly differentiated carcinoma that was ER 25%, VA 0%, [id number] nonamplified. She saw Dr Sharif and underwent lumpectomy on 5/15/19. 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 She had recurrent left mass that was resected on 9/4/19 which showed poorly differentiated ductal carcinoma with metaplastic features 4.5 cm. She was started on anastrozole also in September. She had evaluation for RT to the breast and was found to have palpable axillary LN and referred for axillary u/s.  [de-identified] : anastrozole 9/2019 to present  [de-identified] : poorly differentiated ER 25%, CT 0%, [id number] nonamplified  [de-identified] : She saw Dr Sharif today and they have had family meeting. Pt does not want to undergo RT. She continues to deny any new chest wall pain. She has massage over the left shoulder twice a day from her family members. She denies any issues with pain or change in ROM when abducting or moving arm.

## 2020-02-10 RX ORDER — ANASTROZOLE TABLETS 1 MG/1
1 TABLET ORAL DAILY
Qty: 1 | Refills: 1 | Status: ACTIVE | COMMUNITY
Start: 2019-07-22 | End: 1900-01-01

## 2020-02-12 NOTE — DISEASE MANAGEMENT
[Pathological] : TNM Stage: p [IIB] : IIB [TTNM] : 2 [NTNM] : x [MTNM] : x Inferior Lateral Orbicularis Oculi Units: 8

## 2020-03-14 ENCOUNTER — OUTPATIENT (OUTPATIENT)
Dept: OUTPATIENT SERVICES | Facility: HOSPITAL | Age: 85
LOS: 1 days | Discharge: HOME | End: 2020-03-14

## 2020-03-14 DIAGNOSIS — Z90.49 ACQUIRED ABSENCE OF OTHER SPECIFIED PARTS OF DIGESTIVE TRACT: Chronic | ICD-10-CM

## 2020-03-14 DIAGNOSIS — Z98.890 OTHER SPECIFIED POSTPROCEDURAL STATES: Chronic | ICD-10-CM

## 2020-03-14 DIAGNOSIS — Z90.710 ACQUIRED ABSENCE OF BOTH CERVIX AND UTERUS: Chronic | ICD-10-CM

## 2020-03-14 DIAGNOSIS — C50.919 MALIGNANT NEOPLASM OF UNSPECIFIED SITE OF UNSPECIFIED FEMALE BREAST: ICD-10-CM

## 2020-03-15 RX ORDER — LACTULOSE 10 G/15ML
10 SOLUTION ORAL EVERY 6 HOURS
Qty: 300 | Refills: 0 | Status: ACTIVE | COMMUNITY
Start: 2020-03-15 | End: 1900-01-01

## 2020-03-19 ENCOUNTER — APPOINTMENT (OUTPATIENT)
Dept: SURGICAL ONCOLOGY | Facility: CLINIC | Age: 85
End: 2020-03-19

## 2020-03-19 ENCOUNTER — APPOINTMENT (OUTPATIENT)
Dept: HEMATOLOGY ONCOLOGY | Facility: CLINIC | Age: 85
End: 2020-03-19

## 2020-04-01 DIAGNOSIS — R63.0 ANOREXIA: ICD-10-CM

## 2020-04-01 RX ORDER — DRONABINOL 5 MG/1
5 CAPSULE ORAL
Qty: 60 | Refills: 0 | Status: ACTIVE | COMMUNITY
Start: 2020-04-01 | End: 1900-01-01

## 2020-04-03 DIAGNOSIS — M54.9 DORSALGIA, UNSPECIFIED: ICD-10-CM

## 2020-04-03 DIAGNOSIS — G89.29 DORSALGIA, UNSPECIFIED: ICD-10-CM

## 2020-04-20 RX ORDER — TRAMADOL HYDROCHLORIDE AND ACETAMINOPHEN 37.5; 325 MG/1; MG/1
37.5-325 TABLET, FILM COATED ORAL EVERY 6 HOURS
Qty: 30 | Refills: 0 | Status: ACTIVE | COMMUNITY
Start: 2020-04-20 | End: 1900-01-01

## 2020-04-22 ENCOUNTER — OUTPATIENT (OUTPATIENT)
Dept: OUTPATIENT SERVICES | Facility: HOSPITAL | Age: 85
LOS: 1 days | Discharge: HOME | End: 2020-04-22

## 2020-04-22 ENCOUNTER — INPATIENT (INPATIENT)
Facility: HOSPITAL | Age: 85
LOS: 3 days | Discharge: HOME | DRG: 871 | End: 2020-04-26
Attending: STUDENT IN AN ORGANIZED HEALTH CARE EDUCATION/TRAINING PROGRAM | Admitting: HOSPITALIST
Payer: MEDICAID

## 2020-04-22 VITALS
SYSTOLIC BLOOD PRESSURE: 154 MMHG | RESPIRATION RATE: 18 BRPM | TEMPERATURE: 99 F | OXYGEN SATURATION: 89 % | DIASTOLIC BLOOD PRESSURE: 93 MMHG | HEART RATE: 98 BPM

## 2020-04-22 DIAGNOSIS — Z98.890 OTHER SPECIFIED POSTPROCEDURAL STATES: Chronic | ICD-10-CM

## 2020-04-22 DIAGNOSIS — E83.52 HYPERCALCEMIA: ICD-10-CM

## 2020-04-22 DIAGNOSIS — Z90.49 ACQUIRED ABSENCE OF OTHER SPECIFIED PARTS OF DIGESTIVE TRACT: Chronic | ICD-10-CM

## 2020-04-22 DIAGNOSIS — J90 PLEURAL EFFUSION, NOT ELSEWHERE CLASSIFIED: ICD-10-CM

## 2020-04-22 DIAGNOSIS — D72.829 ELEVATED WHITE BLOOD CELL COUNT, UNSPECIFIED: ICD-10-CM

## 2020-04-22 DIAGNOSIS — E11.69 TYPE 2 DIABETES MELLITUS WITH OTHER SPECIFIED COMPLICATION: ICD-10-CM

## 2020-04-22 DIAGNOSIS — R68.89 OTHER GENERAL SYMPTOMS AND SIGNS: ICD-10-CM

## 2020-04-22 DIAGNOSIS — Z29.9 ENCOUNTER FOR PROPHYLACTIC MEASURES, UNSPECIFIED: ICD-10-CM

## 2020-04-22 DIAGNOSIS — E87.2 ACIDOSIS: ICD-10-CM

## 2020-04-22 DIAGNOSIS — Z90.710 ACQUIRED ABSENCE OF BOTH CERVIX AND UTERUS: Chronic | ICD-10-CM

## 2020-04-22 DIAGNOSIS — I10 ESSENTIAL (PRIMARY) HYPERTENSION: ICD-10-CM

## 2020-04-22 DIAGNOSIS — C50.919 MALIGNANT NEOPLASM OF UNSPECIFIED SITE OF UNSPECIFIED FEMALE BREAST: ICD-10-CM

## 2020-04-22 DIAGNOSIS — N39.0 URINARY TRACT INFECTION, SITE NOT SPECIFIED: ICD-10-CM

## 2020-04-22 DIAGNOSIS — A41.9 SEPSIS, UNSPECIFIED ORGANISM: ICD-10-CM

## 2020-04-22 LAB
ALBUMIN SERPL ELPH-MCNC: 3 G/DL — LOW (ref 3.3–5)
ALP SERPL-CCNC: 73 U/L — SIGNIFICANT CHANGE UP (ref 40–120)
ALT FLD-CCNC: 14 U/L — SIGNIFICANT CHANGE UP (ref 10–45)
ANION GAP SERPL CALC-SCNC: 19 MMOL/L — HIGH (ref 5–17)
ANION GAP SERPL CALC-SCNC: 21 MMOL/L — HIGH (ref 5–17)
APPEARANCE UR: ABNORMAL
AST SERPL-CCNC: 29 U/L — SIGNIFICANT CHANGE UP (ref 10–40)
BACTERIA # UR AUTO: ABNORMAL
BASE EXCESS BLDV CALC-SCNC: -1 MMOL/L — SIGNIFICANT CHANGE UP (ref -2–2)
BASOPHILS # BLD AUTO: 0.22 K/UL — HIGH (ref 0–0.2)
BASOPHILS NFR BLD AUTO: 0.9 % — SIGNIFICANT CHANGE UP (ref 0–2)
BILIRUB SERPL-MCNC: 0.5 MG/DL — SIGNIFICANT CHANGE UP (ref 0.2–1.2)
BILIRUB UR-MCNC: NEGATIVE — SIGNIFICANT CHANGE UP
BUN SERPL-MCNC: 57 MG/DL — HIGH (ref 7–23)
BUN SERPL-MCNC: 59 MG/DL — HIGH (ref 7–23)
CA-I SERPL-SCNC: 1.6 MMOL/L — CRITICAL HIGH (ref 1.12–1.3)
CALCIUM SERPL-MCNC: 12.6 MG/DL — HIGH (ref 8.4–10.5)
CALCIUM SERPL-MCNC: 12.9 MG/DL — HIGH (ref 8.4–10.5)
CHLORIDE BLDV-SCNC: 100 MMOL/L — SIGNIFICANT CHANGE UP (ref 96–108)
CHLORIDE SERPL-SCNC: 96 MMOL/L — SIGNIFICANT CHANGE UP (ref 96–108)
CHLORIDE SERPL-SCNC: 98 MMOL/L — SIGNIFICANT CHANGE UP (ref 96–108)
CO2 BLDV-SCNC: 25 MMOL/L — SIGNIFICANT CHANGE UP (ref 22–30)
CO2 SERPL-SCNC: 18 MMOL/L — LOW (ref 22–31)
CO2 SERPL-SCNC: 19 MMOL/L — LOW (ref 22–31)
COLOR SPEC: YELLOW — SIGNIFICANT CHANGE UP
CREAT SERPL-MCNC: 2.25 MG/DL — HIGH (ref 0.5–1.3)
CREAT SERPL-MCNC: 2.26 MG/DL — HIGH (ref 0.5–1.3)
CRP SERPL-MCNC: 7.73 MG/DL — HIGH (ref 0–0.4)
D DIMER BLD IA.RAPID-MCNC: 1004 NG/ML DDU — HIGH
DIFF PNL FLD: ABNORMAL
EOSINOPHIL # BLD AUTO: 0 K/UL — SIGNIFICANT CHANGE UP (ref 0–0.5)
EOSINOPHIL NFR BLD AUTO: 0 % — SIGNIFICANT CHANGE UP (ref 0–6)
EPI CELLS # UR: 3 — SIGNIFICANT CHANGE UP
FERRITIN SERPL-MCNC: 316 NG/ML — HIGH (ref 15–150)
GAS PNL BLDV: 130 MMOL/L — LOW (ref 135–145)
GAS PNL BLDV: SIGNIFICANT CHANGE UP
GAS PNL BLDV: SIGNIFICANT CHANGE UP
GLUCOSE BLDC GLUCOMTR-MCNC: 300 MG/DL — HIGH (ref 70–99)
GLUCOSE BLDC GLUCOMTR-MCNC: 382 MG/DL — HIGH (ref 70–99)
GLUCOSE BLDV-MCNC: 355 MG/DL — HIGH (ref 70–99)
GLUCOSE SERPL-MCNC: 360 MG/DL — HIGH (ref 70–99)
GLUCOSE SERPL-MCNC: 375 MG/DL — HIGH (ref 70–99)
GLUCOSE UR QL: ABNORMAL
HCO3 BLDV-SCNC: 24 MMOL/L — SIGNIFICANT CHANGE UP (ref 21–29)
HCT VFR BLD CALC: 42.9 % — SIGNIFICANT CHANGE UP (ref 34.5–45)
HCT VFR BLDA CALC: 44 % — SIGNIFICANT CHANGE UP (ref 39–50)
HGB BLD CALC-MCNC: 14.2 G/DL — SIGNIFICANT CHANGE UP (ref 11.5–15.5)
HGB BLD-MCNC: 14.1 G/DL — SIGNIFICANT CHANGE UP (ref 11.5–15.5)
HYALINE CASTS # UR AUTO: 1 /LPF — SIGNIFICANT CHANGE UP (ref 0–7)
KETONES UR-MCNC: NEGATIVE — SIGNIFICANT CHANGE UP
LACTATE BLDV-MCNC: 3.2 MMOL/L — HIGH (ref 0.7–2)
LEUKOCYTE ESTERASE UR-ACNC: ABNORMAL
LYMPHOCYTES # BLD AUTO: 0.65 K/UL — LOW (ref 1–3.3)
LYMPHOCYTES # BLD AUTO: 2.7 % — LOW (ref 13–44)
MANUAL SMEAR VERIFICATION: SIGNIFICANT CHANGE UP
MCHC RBC-ENTMCNC: 31.3 PG — SIGNIFICANT CHANGE UP (ref 27–34)
MCHC RBC-ENTMCNC: 32.9 GM/DL — SIGNIFICANT CHANGE UP (ref 32–36)
MCV RBC AUTO: 95.3 FL — SIGNIFICANT CHANGE UP (ref 80–100)
MONOCYTES # BLD AUTO: 1.28 K/UL — HIGH (ref 0–0.9)
MONOCYTES NFR BLD AUTO: 5.3 % — SIGNIFICANT CHANGE UP (ref 2–14)
NEUTROPHILS # BLD AUTO: 22.01 K/UL — HIGH (ref 1.8–7.4)
NEUTROPHILS NFR BLD AUTO: 86.7 % — HIGH (ref 43–77)
NEUTS BAND # BLD: 4.4 % — SIGNIFICANT CHANGE UP (ref 0–8)
NITRITE UR-MCNC: NEGATIVE — SIGNIFICANT CHANGE UP
PCO2 BLDV: 41 MMHG — SIGNIFICANT CHANGE UP (ref 35–50)
PH BLDV: 7.38 — SIGNIFICANT CHANGE UP (ref 7.35–7.45)
PH UR: 5.5 — SIGNIFICANT CHANGE UP (ref 5–8)
PLAT MORPH BLD: NORMAL — SIGNIFICANT CHANGE UP
PLATELET # BLD AUTO: 409 K/UL — HIGH (ref 150–400)
PO2 BLDV: 67 MMHG — HIGH (ref 25–45)
POLYCHROMASIA BLD QL SMEAR: SLIGHT — SIGNIFICANT CHANGE UP
POTASSIUM BLDV-SCNC: 6.6 MMOL/L — CRITICAL HIGH (ref 3.5–5.3)
POTASSIUM SERPL-MCNC: 4.8 MMOL/L — SIGNIFICANT CHANGE UP (ref 3.5–5.3)
POTASSIUM SERPL-MCNC: 5.5 MMOL/L — HIGH (ref 3.5–5.3)
POTASSIUM SERPL-SCNC: 4.8 MMOL/L — SIGNIFICANT CHANGE UP (ref 3.5–5.3)
POTASSIUM SERPL-SCNC: 5.5 MMOL/L — HIGH (ref 3.5–5.3)
PROCALCITONIN SERPL-MCNC: 0.41 NG/ML — HIGH (ref 0.02–0.1)
PROT SERPL-MCNC: 7.2 G/DL — SIGNIFICANT CHANGE UP (ref 6–8.3)
PROT UR-MCNC: ABNORMAL
RBC # BLD: 4.5 M/UL — SIGNIFICANT CHANGE UP (ref 3.8–5.2)
RBC # FLD: 16.9 % — HIGH (ref 10.3–14.5)
RBC BLD AUTO: ABNORMAL
RBC CASTS # UR COMP ASSIST: 5 /HPF — HIGH (ref 0–4)
SAO2 % BLDV: 92 % — HIGH (ref 67–88)
SARS-COV-2 RNA SPEC QL NAA+PROBE: SIGNIFICANT CHANGE UP
SARS-COV-2 RNA SPEC QL NAA+PROBE: SIGNIFICANT CHANGE UP
SODIUM SERPL-SCNC: 135 MMOL/L — SIGNIFICANT CHANGE UP (ref 135–145)
SODIUM SERPL-SCNC: 136 MMOL/L — SIGNIFICANT CHANGE UP (ref 135–145)
SP GR SPEC: 1.02 — SIGNIFICANT CHANGE UP (ref 1.01–1.02)
TARGETS BLD QL SMEAR: SLIGHT — SIGNIFICANT CHANGE UP
UROBILINOGEN FLD QL: NEGATIVE — SIGNIFICANT CHANGE UP
WBC # BLD: 24.16 K/UL — HIGH (ref 3.8–10.5)
WBC # FLD AUTO: 24.16 K/UL — HIGH (ref 3.8–10.5)
WBC UR QL: >50

## 2020-04-22 PROCEDURE — 93010 ELECTROCARDIOGRAM REPORT: CPT

## 2020-04-22 PROCEDURE — 99285 EMERGENCY DEPT VISIT HI MDM: CPT

## 2020-04-22 PROCEDURE — 99233 SBSQ HOSP IP/OBS HIGH 50: CPT

## 2020-04-22 PROCEDURE — 71045 X-RAY EXAM CHEST 1 VIEW: CPT | Mod: 26

## 2020-04-22 RX ORDER — INSULIN GLARGINE 100 [IU]/ML
35 INJECTION, SOLUTION SUBCUTANEOUS AT BEDTIME
Refills: 0 | Status: DISCONTINUED | OUTPATIENT
Start: 2020-04-22 | End: 2020-04-23

## 2020-04-22 RX ORDER — GLUCAGON INJECTION, SOLUTION 0.5 MG/.1ML
1 INJECTION, SOLUTION SUBCUTANEOUS ONCE
Refills: 0 | Status: DISCONTINUED | OUTPATIENT
Start: 2020-04-22 | End: 2020-04-26

## 2020-04-22 RX ORDER — DEXTROSE 50 % IN WATER 50 %
15 SYRINGE (ML) INTRAVENOUS ONCE
Refills: 0 | Status: DISCONTINUED | OUTPATIENT
Start: 2020-04-22 | End: 2020-04-26

## 2020-04-22 RX ORDER — INSULIN LISPRO 100/ML
VIAL (ML) SUBCUTANEOUS
Refills: 0 | Status: DISCONTINUED | OUTPATIENT
Start: 2020-04-22 | End: 2020-04-23

## 2020-04-22 RX ORDER — SODIUM CHLORIDE 9 MG/ML
1000 INJECTION, SOLUTION INTRAVENOUS
Refills: 0 | Status: DISCONTINUED | OUTPATIENT
Start: 2020-04-22 | End: 2020-04-26

## 2020-04-22 RX ORDER — SODIUM CHLORIDE 9 MG/ML
1000 INJECTION INTRAMUSCULAR; INTRAVENOUS; SUBCUTANEOUS
Refills: 0 | Status: DISCONTINUED | OUTPATIENT
Start: 2020-04-22 | End: 2020-04-23

## 2020-04-22 RX ORDER — ACETAMINOPHEN 500 MG
3 TABLET ORAL
Qty: 0 | Refills: 0 | DISCHARGE

## 2020-04-22 RX ORDER — DONEPEZIL HYDROCHLORIDE 10 MG/1
10 TABLET, FILM COATED ORAL AT BEDTIME
Refills: 0 | Status: DISCONTINUED | OUTPATIENT
Start: 2020-04-22 | End: 2020-04-26

## 2020-04-22 RX ORDER — DOCUSATE SODIUM 100 MG
1 CAPSULE ORAL
Qty: 0 | Refills: 0 | DISCHARGE

## 2020-04-22 RX ORDER — FUROSEMIDE 40 MG
1 TABLET ORAL
Qty: 0 | Refills: 0 | DISCHARGE

## 2020-04-22 RX ORDER — POLYETHYLENE GLYCOL 3350 17 G/17G
17 POWDER, FOR SOLUTION ORAL
Qty: 0 | Refills: 0 | DISCHARGE

## 2020-04-22 RX ORDER — DEXTROSE 50 % IN WATER 50 %
25 SYRINGE (ML) INTRAVENOUS ONCE
Refills: 0 | Status: DISCONTINUED | OUTPATIENT
Start: 2020-04-22 | End: 2020-04-26

## 2020-04-22 RX ORDER — CEFTRIAXONE 500 MG/1
1000 INJECTION, POWDER, FOR SOLUTION INTRAMUSCULAR; INTRAVENOUS ONCE
Refills: 0 | Status: COMPLETED | OUTPATIENT
Start: 2020-04-22 | End: 2020-04-22

## 2020-04-22 RX ORDER — AZITHROMYCIN 500 MG/1
500 TABLET, FILM COATED ORAL ONCE
Refills: 0 | Status: COMPLETED | OUTPATIENT
Start: 2020-04-22 | End: 2020-04-22

## 2020-04-22 RX ORDER — INSULIN GLARGINE 100 [IU]/ML
30 INJECTION, SOLUTION SUBCUTANEOUS AT BEDTIME
Refills: 0 | Status: DISCONTINUED | OUTPATIENT
Start: 2020-04-22 | End: 2020-04-22

## 2020-04-22 RX ORDER — AZITHROMYCIN 500 MG/1
500 TABLET, FILM COATED ORAL DAILY
Refills: 0 | Status: DISCONTINUED | OUTPATIENT
Start: 2020-04-23 | End: 2020-04-23

## 2020-04-22 RX ORDER — DEXTROSE 50 % IN WATER 50 %
12.5 SYRINGE (ML) INTRAVENOUS ONCE
Refills: 0 | Status: DISCONTINUED | OUTPATIENT
Start: 2020-04-22 | End: 2020-04-26

## 2020-04-22 RX ORDER — METOPROLOL TARTRATE 50 MG
1 TABLET ORAL
Qty: 0 | Refills: 0 | DISCHARGE

## 2020-04-22 RX ORDER — SIMVASTATIN 20 MG/1
20 TABLET, FILM COATED ORAL AT BEDTIME
Refills: 0 | Status: DISCONTINUED | OUTPATIENT
Start: 2020-04-22 | End: 2020-04-26

## 2020-04-22 RX ORDER — ENOXAPARIN SODIUM 100 MG/ML
40 INJECTION SUBCUTANEOUS DAILY
Refills: 0 | Status: DISCONTINUED | OUTPATIENT
Start: 2020-04-22 | End: 2020-04-22

## 2020-04-22 RX ORDER — INSULIN GLARGINE 100 [IU]/ML
0 INJECTION, SOLUTION SUBCUTANEOUS
Qty: 0 | Refills: 0 | DISCHARGE

## 2020-04-22 RX ORDER — DONEPEZIL HYDROCHLORIDE 10 MG/1
1 TABLET, FILM COATED ORAL
Qty: 0 | Refills: 0 | DISCHARGE

## 2020-04-22 RX ORDER — ACETAMINOPHEN 500 MG
1 TABLET ORAL
Qty: 0 | Refills: 0 | DISCHARGE

## 2020-04-22 RX ORDER — CEFTRIAXONE 500 MG/1
1000 INJECTION, POWDER, FOR SOLUTION INTRAMUSCULAR; INTRAVENOUS EVERY 24 HOURS
Refills: 0 | Status: DISCONTINUED | OUTPATIENT
Start: 2020-04-23 | End: 2020-04-23

## 2020-04-22 RX ORDER — ASPIRIN/CALCIUM CARB/MAGNESIUM 324 MG
81 TABLET ORAL DAILY
Refills: 0 | Status: DISCONTINUED | OUTPATIENT
Start: 2020-04-22 | End: 2020-04-26

## 2020-04-22 RX ORDER — HEPARIN SODIUM 5000 [USP'U]/ML
5000 INJECTION INTRAVENOUS; SUBCUTANEOUS EVERY 12 HOURS
Refills: 0 | Status: DISCONTINUED | OUTPATIENT
Start: 2020-04-22 | End: 2020-04-24

## 2020-04-22 RX ADMIN — HEPARIN SODIUM 5000 UNIT(S): 5000 INJECTION INTRAVENOUS; SUBCUTANEOUS at 21:47

## 2020-04-22 RX ADMIN — SIMVASTATIN 20 MILLIGRAM(S): 20 TABLET, FILM COATED ORAL at 21:46

## 2020-04-22 RX ADMIN — DONEPEZIL HYDROCHLORIDE 10 MILLIGRAM(S): 10 TABLET, FILM COATED ORAL at 21:47

## 2020-04-22 RX ADMIN — CEFTRIAXONE 100 MILLIGRAM(S): 500 INJECTION, POWDER, FOR SOLUTION INTRAMUSCULAR; INTRAVENOUS at 13:54

## 2020-04-22 RX ADMIN — Medication 5: at 18:54

## 2020-04-22 RX ADMIN — SODIUM CHLORIDE 50 MILLILITER(S): 9 INJECTION INTRAMUSCULAR; INTRAVENOUS; SUBCUTANEOUS at 21:47

## 2020-04-22 RX ADMIN — AZITHROMYCIN 250 MILLIGRAM(S): 500 TABLET, FILM COATED ORAL at 14:54

## 2020-04-22 RX ADMIN — INSULIN GLARGINE 35 UNIT(S): 100 INJECTION, SOLUTION SUBCUTANEOUS at 21:56

## 2020-04-22 NOTE — ED PROVIDER NOTE - GASTROINTESTINAL NEGATIVE STATEMENT, MLM
+DEC APPETITE. no abdominal pain, no bloating, no constipation, no diarrhea, no nausea and no vomiting.

## 2020-04-22 NOTE — H&P ADULT - PROBLEM SELECTOR PLAN 5
Elevated to 12 with high ionized calcium   -most likely due to malignancy   -Will dose 500cc bolus   -Will monitor daily Elevated to 12 with high ionized calcium   -most likely due to malignancy   -Will consider endo consult   -Will monitor daily

## 2020-04-22 NOTE — H&P ADULT - PROBLEM SELECTOR PLAN 8
UA is positive for bacteria, LE, and nitrate   -Ucx is pending   -Patient is symptomatic with urinary incontinent and dysuria

## 2020-04-22 NOTE — H&P ADULT - PROBLEM SELECTOR PLAN 2
Patient has hypoxic respiratory failure   -F/u with COVID PCR   -Currently sating 89% on RA and 96% on NC-2L  -Monitor off toci

## 2020-04-22 NOTE — ED ADULT NURSE REASSESSMENT NOTE - NS ED NURSE REASSESS COMMENT FT1
Pt changed, turned, and repositioned. VSS. Admitted pending bed assignment. Comfortably resting on stretcher, side rails up for safety, call bell and personal belongings within reach, instructed in use, verbalizes understanding.

## 2020-04-22 NOTE — ED ADULT NURSE REASSESSMENT NOTE - NS ED NURSE REASSESS COMMENT FT1
Pt resting comfortably on stretcher, O2 sat 99% on 3L NC, pt does not appear in acute distress. Side rails up for safety, person belongings and call bell within reach. Admitted awaiting bedspot. Will continue to monitor.

## 2020-04-22 NOTE — ED PROVIDER NOTE - PROGRESS NOTE DETAILS
Case d/w pt's oncologist Dr Sadler - pt had been complaining of decreased appetite/lower back pain x 2 weeks, was on enastrazole which was DCd thought to be the cause of her symptoms. Pt had visiting nurse from PMD Dr. Howard who informed pt of leukocytosis and told to go to the ER. - Lemuel Manuel PA-C

## 2020-04-22 NOTE — H&P ADULT - ASSESSMENT
89 yo F with Pmhx of breast cancer s/p lumpectomy and hormone therapy, DMII, HTN presents to the ED with complaints of weakness, loss of appetite, cough, SOB, and diarrhea, found to have tachycardia, hypoxia, leukocytosis, and b/l pleural opacities with L sided pleural effusion, concerning for COVID with superimposed PNA and UTI.

## 2020-04-22 NOTE — H&P ADULT - ATTENDING COMMENTS
Pt presents with sepsis likely 2/2 UTI and possible COVID, and questionable superimposed bacterial PNA.  Additionally patient has sob, acute hypoxic respiratory failure, and found to have pleural effusion.  Pt also has hypercalcemia and NATALIA.    Pt started on ceftriaxone and azithromycin for UTI, and possible bacterial PNA.  Called ID consult, will f/u recs.  Pt satting well at 99% on O2 by NC.  Will call pulm consult to eval pleural effusion for possible role of thoracentesis.  Need to avoid IVF NS given pt has pleural effusions so will call Endo for hypercalcemia eval, possibly hypercalcemia is related to known breast cancer, and underlying malignancy causing elevated Ca.  Will f/u Endo recs.    Gal Fletcher, Attending Physician Pt presents with sepsis likely 2/2 UTI and possible COVID, and questionable superimposed bacterial PNA.  Additionally patient has sob, acute hypoxic respiratory failure, and found to have pleural effusion.  Pt also has hypercalcemia and NATALIA.    Pt started on ceftriaxone and azithromycin for UTI, and possible bacterial PNA.  Called ID consult, will f/u recs.  Pt satting well at 99% on O2 by NC.  Will call pulm consult to eval pleural effusion for possible role of thoracentesis.  Need to avoid IVF NS given pt has pleural effusions so will call Endo for hypercalcemia eval, possibly hypercalcemia is related to known breast cancer, and underlying malignancy causing elevated Ca.  Will f/u Endo recs.  Elevated D-dimer, possibly related to malignancy, will check le doppler.  No chest pain, and multiple possible etiologies related to shortness of breath and the high d-dimer, and pt has NATALIA, so will avoid CT angio for now, and VQ scan likely of low clinical utility with underlying lung pathology.  Will await doppler results.    Gal Fletcher, Attending Physician

## 2020-04-22 NOTE — H&P ADULT - NSHPLABSRESULTS_GEN_ALL_CORE
14.1   24.16 )-----------( 409      ( 2020 13:02 )             42.9     Hgb Trend: 14.1<--      136  |  98  |  57<H>  ----------------------------<  360<H>  4.8   |  19<L>  |  2.25<H>    Ca    12.6<H>      2020 14:05    TPro  7.2  /  Alb  3.0<L>  /  TBili  0.5  /  DBili  x   /  AST  29  /  ALT  14  /  AlkPhos  73      Creatinine Trend: 2.25<--, 2.26<--        Urinalysis Basic - ( 2020 13:36 )    Color: Yellow / Appearance: Turbid / S.020 / pH: x  Gluc: x / Ketone: Negative  / Bili: Negative / Urobili: Negative   Blood: x / Protein: 30 mg/dL / Nitrite: Negative   Leuk Esterase: Large / RBC: 5 /hpf / WBC >50   Sq Epi: x / Non Sq Epi: 3 / Bacteria: Many        CXR shows b/l opacities and L sided pleural effusions.

## 2020-04-22 NOTE — ED PROVIDER NOTE - OBJECTIVE STATEMENT
89y f PMHx breast ca w/lumpectomy on active chemo, CKD, HTN, HLD, DM p/w SOB and elevated WBCS. Pt reports weakness, decreased appetite and SOB x1 week. Collateral gathered from daughter/EMS - pt was called by [facility]D for elevated WBCs on routine bloodwork (21 wbc) referred to the ED. Pt lives with her daughter and granddaughter. Pt had UTI 2 weeks ago. Denies fever, chills, chest pain, leg pain/swelling, recent travel, hemoptysis, abd pain, N/V/D/C, or urinary symptoms. No known COVID contacts. Pt has not been tested for COVID outpatient. Pt is DNR/DNI.   Oncologist Dr. Donny Sadler

## 2020-04-22 NOTE — H&P ADULT - PROBLEM SELECTOR PLAN 6
Lactic acidosis with elevated AG   -Most likely due to sepsis and lactic acidosis   -Will give her 500 cc bolus Lactic acidosis with elevated AG   -Most likely due to sepsis and lactic acidosis

## 2020-04-22 NOTE — ED PROVIDER NOTE - ATTENDING CONTRIBUTION TO CARE
Attending MD Sanchez:   I personally have seen and examined this patient.  Physician assistant note reviewed and agree on plan of care and except where noted.

## 2020-04-22 NOTE — H&P ADULT - PROBLEM SELECTOR PLAN 9
DVT-enoxaparin 40 mg   Diet-DASH/consistent carbs  Transitions of Care Status:  1.  Name of PCP: Dr. Sadler   2.  PCP Contacted on Admission: [x ] Y    [ ] N    3.  PCP contacted at Discharge: [ ] Y    [ ] N    [ ] N/A  4.  Post-Discharge Appointment Date and Location:  5.  Summary of Handoff given to PCP:

## 2020-04-22 NOTE — H&P ADULT - HISTORY OF PRESENT ILLNESS
89 yo F with Pmhx of breast cancer s/p lumpectomy and hormone therapy, DMII, HTN presents to the ED with complaints of weakness, loss of appetite, cough, SOB, and diarrhea. Patient has been having the symptoms for the last 4 weeks. She has been having decreased PO intake. She also has been becoming progressively weak and as a result, patient was sent to the ED. She has no known COVID exposure. She was recently diagnosed with UTI and was treated with abx for 7 days. His oncologist obtained labs as outpatient and found that she had elevated WBC.   In the vitals were: Tmax 98.7 F, BP 90s, -150s/80-90s mmHg, RR 20, SO2 99 on NC 2L. Her labs were significant for leukocytosis, lymphopenia, bandemia, thrombocytosis, elevated AG, hyperglycemia, lactic acidosis, hypercalcemia, hypercalcemia. UA showed large LE, nitrate, and bacteria.

## 2020-04-22 NOTE — H&P ADULT - PROBLEM SELECTOR PLAN 7
L sided pleural effusion   -Most likely due to infection vs. malignancy   -COVID does not usually cause pleural effusion   -Will consult pulm for possible thoracentesis

## 2020-04-22 NOTE — ED PROVIDER NOTE - CARE PLAN
Principal Discharge DX:	Leukocytosis  Secondary Diagnosis:	Suspected 2019 novel coronavirus infection

## 2020-04-22 NOTE — H&P ADULT - NSHPPHYSICALEXAM_GEN_ALL_CORE
GENERAL: in mild respiratory distress, cachectic, confused,   HEENT:  Conjunctiva and sclera clear, oral mucosa moist, clear w/o any exudate   NECK: Supple, No JVD  CHEST/LUNG: Clear to auscultation bilaterally; No wheeze  HEART: Regular rate and rhythm; No murmurs, rubs, or gallops  ABDOMEN: Soft, Nontender, Nondistended; Bowel sounds present  EXTREMITIES:  2+ Peripheral Pulses, No clubbing, cyanosis, or edema  PSYCH: AAOx3, but confused   NEUROLOGY: non-focal  SKIN: No rashes or lesions

## 2020-04-22 NOTE — ED ADULT NURSE NOTE - OBJECTIVE STATEMENT
Pt is a 89yo F PMH L breast CA, DM, HTN, HLD, GERD, CKD c/o SOB, decreased appetite, decreased PO intake, and difficulty breathing at night for the past few days. Pt denies pain, N/V/D. Increased work of breathing, no retractions, or cyanosis noted. A&Ox2 (self, place), HE 4/5, lung sounds crackles b/l, distal pulses intact, abdomen soft, nontender, skin is intact, dry, fragile, ecchymotic. Some yellow coating noted on tongue. Pt VSS, O2 sat 100% on 3L NC. Pt resting comfortably on stretcher with some increased work of breathing noted, side rails up for safety, call bell and personal belongings within reach, instructed to wait for assistance, verbalizes understanding. Will continue to monitor. Pt is a 89yo F PMH L breast CA, DM, HTN, HLD, GERD, CKD c/o SOB, decreased appetite, decreased PO intake, and difficulty breathing at night for the past few days. Pt denies pain, N/V/D. Increased work of breathing, no retractions, or cyanosis noted. A&Ox2 (self, place), HE 4/5, lung sounds crackles b/l, distal pulses intact, abdomen soft, nontender, skin is dry, fragile, ecchymotic, stage II noted gluteal cleft. Some yellow coating noted on tongue. Pt VSS, O2 sat 100% on 3L NC. Pt resting comfortably on stretcher with some increased work of breathing noted, side rails up for safety, call bell and personal belongings within reach, instructed to wait for assistance, verbalizes understanding. Will continue to monitor.

## 2020-04-22 NOTE — ED PROVIDER NOTE - CLINICAL SUMMARY MEDICAL DECISION MAKING FREE TEXT BOX
Attending MD Sanchez: 89 yo female with hx of HTN, DMT2, HLD, GERD with hx of malignant neoplasm left  breast presents with SOB,  weakness, decreased appetite and found to have elevated WBC. Patient with recent UTI.  Denies fevers, lower extremity edema.

## 2020-04-22 NOTE — H&P ADULT - PROBLEM SELECTOR PLAN 1
Tachycardic, tachypneic, leukocytosis, bandemia, and lactic acidosis, concerning for sepsis due to superimposed PNA and UTI   -Bcx and urine culture sent   -Patient started on azithromycin and ceftriaxone   -COVID PCR pending   -S/p 500 cc bolus Tachycardic, tachypneic, leukocytosis, bandemia, and lactic acidosis, concerning for sepsis due to superimposed PNA and UTI   -Bcx and urine culture sent   -Patient started on azithromycin and ceftriaxone   -COVID PCR is negative   -ID consulted

## 2020-04-22 NOTE — H&P ADULT - NSICDXPASTSURGICALHX_GEN_ALL_CORE_FT
PAST SURGICAL HISTORY:  H/O lumpectomy left breast 5/2019    S/P cholecystectomy 2016    S/P hysterectomy at age 40

## 2020-04-22 NOTE — CHART NOTE - NSCHARTNOTEFT_GEN_A_CORE
Endocrine consulted on this 89 yo F with Pmhx of breast cancer s/p lumpectomy and hormone therapy, DMII, HTN presents to the ED with complaints of weakness, loss of appetite, cough, SOB, and diarrhea, found to have tachycardia, hypoxia, leukocytosis, and b/l pleural opacities with L sided pleural effusion, concerning for COVID with superimposed PNA and UTI.   Endo consulted for hypercalcemia.    Hypercalcemia:  Latest Ca 12.6, alb 3, corrected Ca 13.4.  As per ED provider, she is at baseline mental status. No c/o n/V or constipation.   Unclear whether hypercalcemia is acute or chronic  DDx for hypercalcemia can be 2/2 dehydration/ immobility/ malignancy either from bone mets or PTHrp / hyperparathyroidism or combination  Check PTH, ViT d 25 OH and 1, 25 OH, PTHrp (given Ca history)  Please gently hydrate if tolerated  Monitor BMP q4h    DM: Hba1c pending, was 8 8/2019  As per pharmacy on Lantus 30u at home but unclear if she is taking it or not  Check Hba1c  Can give Lantus 35u qhs and low dose SS TIDAC/qhs for now      D/W ED provider   Full consult to follow derik

## 2020-04-22 NOTE — H&P ADULT - NSICDXPASTMEDICALHX_GEN_ALL_CORE_FT
PAST MEDICAL HISTORY:  DM (diabetes mellitus) type 2    H/O gastroesophageal reflux (GERD)     Hyperlipidemia     Hypertension     Kidney disease pt states, has 30 % kidney function - denies having nephrologist. Pt reports elevated K+ last year, normal level maintained with diet restrictions

## 2020-04-22 NOTE — CHART NOTE - NSCHARTNOTEFT_GEN_A_CORE
AMY OCHOA  89y Female  Patient is a 89y old  Female who presents with a chief complaint of Weakness, diarrhea, and cough (22 Apr 2020 16:38)     Event Summary:  Received callback from Endocrine, consulted via service, recommends extremely gentle IV hydration (with understanding of pleural effusion on CXR & COVID + status). Also recommends optimizing patient's basal insulin (at home patient on Basaglar 40 units + 30 units).  -Plan to order gentle IV hydration x 4 hours (50ccs/hr), plan to repeat BMP @ 2200 tonight to treat Ca)  -Plan to increase Lantus to 35 units HS    -Full Endocrine consult to follow in AM    Nathaniel Saintus DNP, FNP-BC  #[phone]

## 2020-04-23 DIAGNOSIS — Z71.89 OTHER SPECIFIED COUNSELING: ICD-10-CM

## 2020-04-23 DIAGNOSIS — Z51.5 ENCOUNTER FOR PALLIATIVE CARE: ICD-10-CM

## 2020-04-23 DIAGNOSIS — E78.49 OTHER HYPERLIPIDEMIA: ICD-10-CM

## 2020-04-23 DIAGNOSIS — N17.9 ACUTE KIDNEY FAILURE, UNSPECIFIED: ICD-10-CM

## 2020-04-23 LAB
24R-OH-CALCIDIOL SERPL-MCNC: 43 NG/ML — SIGNIFICANT CHANGE UP (ref 30–80)
A1C WITH ESTIMATED AVERAGE GLUCOSE RESULT: 10 % — HIGH (ref 4–5.6)
A1C WITH ESTIMATED AVERAGE GLUCOSE RESULT: 10 % — HIGH (ref 4–5.6)
ALBUMIN SERPL ELPH-MCNC: 3.1 G/DL — LOW (ref 3.3–5)
ALP SERPL-CCNC: 66 U/L — SIGNIFICANT CHANGE UP (ref 40–120)
ALT FLD-CCNC: 14 U/L — SIGNIFICANT CHANGE UP (ref 10–45)
ANION GAP SERPL CALC-SCNC: 12 MMOL/L — SIGNIFICANT CHANGE UP (ref 5–17)
ANION GAP SERPL CALC-SCNC: 13 MMOL/L — SIGNIFICANT CHANGE UP (ref 5–17)
ANION GAP SERPL CALC-SCNC: 14 MMOL/L — SIGNIFICANT CHANGE UP (ref 5–17)
ANION GAP SERPL CALC-SCNC: 18 MMOL/L — HIGH (ref 5–17)
AST SERPL-CCNC: 23 U/L — SIGNIFICANT CHANGE UP (ref 10–40)
BILIRUB SERPL-MCNC: 0.4 MG/DL — SIGNIFICANT CHANGE UP (ref 0.2–1.2)
BUN SERPL-MCNC: 41 MG/DL — HIGH (ref 7–23)
BUN SERPL-MCNC: 56 MG/DL — HIGH (ref 7–23)
BUN SERPL-MCNC: 58 MG/DL — HIGH (ref 7–23)
BUN SERPL-MCNC: 59 MG/DL — HIGH (ref 7–23)
CALCIUM SERPL-MCNC: 12.5 MG/DL — HIGH (ref 8.4–10.5)
CALCIUM SERPL-MCNC: 12.5 MG/DL — HIGH (ref 8.4–10.5)
CALCIUM SERPL-MCNC: 12.8 MG/DL — HIGH (ref 8.4–10.5)
CALCIUM SERPL-MCNC: 12.8 MG/DL — HIGH (ref 8.4–10.5)
CALCIUM SERPL-MCNC: 7 MG/DL — LOW (ref 8.4–10.5)
CHLORIDE SERPL-SCNC: 100 MMOL/L — SIGNIFICANT CHANGE UP (ref 96–108)
CHLORIDE SERPL-SCNC: 116 MMOL/L — HIGH (ref 96–108)
CHLORIDE SERPL-SCNC: 99 MMOL/L — SIGNIFICANT CHANGE UP (ref 96–108)
CHLORIDE SERPL-SCNC: 99 MMOL/L — SIGNIFICANT CHANGE UP (ref 96–108)
CO2 SERPL-SCNC: 12 MMOL/L — LOW (ref 22–31)
CO2 SERPL-SCNC: 20 MMOL/L — LOW (ref 22–31)
CO2 SERPL-SCNC: 20 MMOL/L — LOW (ref 22–31)
CO2 SERPL-SCNC: 23 MMOL/L — SIGNIFICANT CHANGE UP (ref 22–31)
CREAT SERPL-MCNC: 1.38 MG/DL — HIGH (ref 0.5–1.3)
CREAT SERPL-MCNC: 2.17 MG/DL — HIGH (ref 0.5–1.3)
CREAT SERPL-MCNC: 2.21 MG/DL — HIGH (ref 0.5–1.3)
CREAT SERPL-MCNC: 2.25 MG/DL — HIGH (ref 0.5–1.3)
ESTIMATED AVERAGE GLUCOSE: 240 MG/DL — HIGH (ref 68–114)
ESTIMATED AVERAGE GLUCOSE: 240 MG/DL — HIGH (ref 68–114)
GLUCOSE BLDC GLUCOMTR-MCNC: 129 MG/DL — HIGH (ref 70–99)
GLUCOSE BLDC GLUCOMTR-MCNC: 229 MG/DL — HIGH (ref 70–99)
GLUCOSE BLDC GLUCOMTR-MCNC: 235 MG/DL — HIGH (ref 70–99)
GLUCOSE BLDC GLUCOMTR-MCNC: 242 MG/DL — HIGH (ref 70–99)
GLUCOSE BLDC GLUCOMTR-MCNC: 247 MG/DL — HIGH (ref 70–99)
GLUCOSE SERPL-MCNC: 145 MG/DL — HIGH (ref 70–99)
GLUCOSE SERPL-MCNC: 182 MG/DL — HIGH (ref 70–99)
GLUCOSE SERPL-MCNC: 249 MG/DL — HIGH (ref 70–99)
GLUCOSE SERPL-MCNC: 253 MG/DL — HIGH (ref 70–99)
HCT VFR BLD CALC: 41.1 % — SIGNIFICANT CHANGE UP (ref 34.5–45)
HGB BLD-MCNC: 13.5 G/DL — SIGNIFICANT CHANGE UP (ref 11.5–15.5)
MCHC RBC-ENTMCNC: 31.6 PG — SIGNIFICANT CHANGE UP (ref 27–34)
MCHC RBC-ENTMCNC: 32.8 GM/DL — SIGNIFICANT CHANGE UP (ref 32–36)
MCV RBC AUTO: 96.3 FL — SIGNIFICANT CHANGE UP (ref 80–100)
NRBC # BLD: 0 /100 WBCS — SIGNIFICANT CHANGE UP (ref 0–0)
NT-PROBNP SERPL-SCNC: 3737 PG/ML — HIGH (ref 0–300)
PLATELET # BLD AUTO: 385 K/UL — SIGNIFICANT CHANGE UP (ref 150–400)
POTASSIUM SERPL-MCNC: 2.7 MMOL/L — CRITICAL LOW (ref 3.5–5.3)
POTASSIUM SERPL-MCNC: 5.6 MMOL/L — HIGH (ref 3.5–5.3)
POTASSIUM SERPL-MCNC: 6.1 MMOL/L — HIGH (ref 3.5–5.3)
POTASSIUM SERPL-MCNC: 6.2 MMOL/L — CRITICAL HIGH (ref 3.5–5.3)
POTASSIUM SERPL-SCNC: 2.7 MMOL/L — CRITICAL LOW (ref 3.5–5.3)
POTASSIUM SERPL-SCNC: 5.6 MMOL/L — HIGH (ref 3.5–5.3)
POTASSIUM SERPL-SCNC: 6.1 MMOL/L — HIGH (ref 3.5–5.3)
POTASSIUM SERPL-SCNC: 6.2 MMOL/L — CRITICAL HIGH (ref 3.5–5.3)
PROT SERPL-MCNC: 6.9 G/DL — SIGNIFICANT CHANGE UP (ref 6–8.3)
PTH-INTACT FLD-MCNC: 4 PG/ML — LOW (ref 15–65)
RBC # BLD: 4.27 M/UL — SIGNIFICANT CHANGE UP (ref 3.8–5.2)
RBC # FLD: 17.2 % — HIGH (ref 10.3–14.5)
SARS-COV-2 RNA SPEC QL NAA+PROBE: SIGNIFICANT CHANGE UP
SODIUM SERPL-SCNC: 133 MMOL/L — LOW (ref 135–145)
SODIUM SERPL-SCNC: 135 MMOL/L — SIGNIFICANT CHANGE UP (ref 135–145)
SODIUM SERPL-SCNC: 137 MMOL/L — SIGNIFICANT CHANGE UP (ref 135–145)
SODIUM SERPL-SCNC: 141 MMOL/L — SIGNIFICANT CHANGE UP (ref 135–145)
TSH SERPL-MCNC: 0.8 UIU/ML — SIGNIFICANT CHANGE UP (ref 0.27–4.2)
VIT D25+D1,25 OH+D1,25 PNL SERPL-MCNC: 77.8 PG/ML — SIGNIFICANT CHANGE UP (ref 19.9–79.3)
WBC # BLD: 29.66 K/UL — HIGH (ref 3.8–10.5)
WBC # FLD AUTO: 29.66 K/UL — HIGH (ref 3.8–10.5)

## 2020-04-23 PROCEDURE — 93010 ELECTROCARDIOGRAM REPORT: CPT

## 2020-04-23 PROCEDURE — 99223 1ST HOSP IP/OBS HIGH 75: CPT | Mod: GC

## 2020-04-23 PROCEDURE — 71250 CT THORAX DX C-: CPT | Mod: 26

## 2020-04-23 PROCEDURE — 74176 CT ABD & PELVIS W/O CONTRAST: CPT | Mod: 26

## 2020-04-23 PROCEDURE — 99253 IP/OBS CNSLTJ NEW/EST LOW 45: CPT | Mod: GC

## 2020-04-23 PROCEDURE — 93970 EXTREMITY STUDY: CPT | Mod: 26

## 2020-04-23 RX ORDER — SODIUM CHLORIDE 9 MG/ML
300 INJECTION, SOLUTION INTRAVENOUS
Refills: 0 | Status: DISCONTINUED | OUTPATIENT
Start: 2020-04-23 | End: 2020-04-23

## 2020-04-23 RX ORDER — PAMIDRONATE DISODIUM 9 MG/ML
60 INJECTION, SOLUTION INTRAVENOUS ONCE
Refills: 0 | Status: COMPLETED | OUTPATIENT
Start: 2020-04-23 | End: 2020-04-23

## 2020-04-23 RX ORDER — ONDANSETRON 8 MG/1
4 TABLET, FILM COATED ORAL ONCE
Refills: 0 | Status: COMPLETED | OUTPATIENT
Start: 2020-04-23 | End: 2020-04-23

## 2020-04-23 RX ORDER — INSULIN LISPRO 100/ML
VIAL (ML) SUBCUTANEOUS
Refills: 0 | Status: DISCONTINUED | OUTPATIENT
Start: 2020-04-23 | End: 2020-04-26

## 2020-04-23 RX ORDER — CALCITONIN SALMON 200 [IU]/ML
280 INJECTION, SOLUTION INTRAMUSCULAR EVERY 12 HOURS
Refills: 0 | Status: COMPLETED | OUTPATIENT
Start: 2020-04-23 | End: 2020-04-25

## 2020-04-23 RX ORDER — INSULIN GLARGINE 100 [IU]/ML
32 INJECTION, SOLUTION SUBCUTANEOUS AT BEDTIME
Refills: 0 | Status: DISCONTINUED | OUTPATIENT
Start: 2020-04-23 | End: 2020-04-23

## 2020-04-23 RX ORDER — POTASSIUM CHLORIDE 20 MEQ
40 PACKET (EA) ORAL ONCE
Refills: 0 | Status: COMPLETED | OUTPATIENT
Start: 2020-04-23 | End: 2020-04-23

## 2020-04-23 RX ORDER — CEFEPIME 1 G/1
1000 INJECTION, POWDER, FOR SOLUTION INTRAMUSCULAR; INTRAVENOUS EVERY 12 HOURS
Refills: 0 | Status: DISCONTINUED | OUTPATIENT
Start: 2020-04-24 | End: 2020-04-26

## 2020-04-23 RX ORDER — CEFEPIME 1 G/1
1000 INJECTION, POWDER, FOR SOLUTION INTRAMUSCULAR; INTRAVENOUS ONCE
Refills: 0 | Status: COMPLETED | OUTPATIENT
Start: 2020-04-23 | End: 2020-04-23

## 2020-04-23 RX ORDER — POTASSIUM CHLORIDE 20 MEQ
10 PACKET (EA) ORAL
Refills: 0 | Status: COMPLETED | OUTPATIENT
Start: 2020-04-23 | End: 2020-04-23

## 2020-04-23 RX ORDER — INSULIN GLARGINE 100 [IU]/ML
30 INJECTION, SOLUTION SUBCUTANEOUS AT BEDTIME
Refills: 0 | Status: DISCONTINUED | OUTPATIENT
Start: 2020-04-23 | End: 2020-04-26

## 2020-04-23 RX ORDER — INSULIN HUMAN 100 [IU]/ML
5 INJECTION, SOLUTION SUBCUTANEOUS ONCE
Refills: 0 | Status: DISCONTINUED | OUTPATIENT
Start: 2020-04-23 | End: 2020-04-23

## 2020-04-23 RX ORDER — INSULIN GLARGINE 100 [IU]/ML
35 INJECTION, SOLUTION SUBCUTANEOUS AT BEDTIME
Refills: 0 | Status: DISCONTINUED | OUTPATIENT
Start: 2020-04-23 | End: 2020-04-23

## 2020-04-23 RX ORDER — CEFEPIME 1 G/1
INJECTION, POWDER, FOR SOLUTION INTRAMUSCULAR; INTRAVENOUS
Refills: 0 | Status: DISCONTINUED | OUTPATIENT
Start: 2020-04-23 | End: 2020-04-26

## 2020-04-23 RX ORDER — INSULIN LISPRO 100/ML
VIAL (ML) SUBCUTANEOUS AT BEDTIME
Refills: 0 | Status: DISCONTINUED | OUTPATIENT
Start: 2020-04-23 | End: 2020-04-23

## 2020-04-23 RX ORDER — INSULIN LISPRO 100/ML
4 VIAL (ML) SUBCUTANEOUS
Refills: 0 | Status: DISCONTINUED | OUTPATIENT
Start: 2020-04-23 | End: 2020-04-24

## 2020-04-23 RX ORDER — DEXTROSE 50 % IN WATER 50 %
50 SYRINGE (ML) INTRAVENOUS ONCE
Refills: 0 | Status: DISCONTINUED | OUTPATIENT
Start: 2020-04-23 | End: 2020-04-23

## 2020-04-23 RX ADMIN — INSULIN GLARGINE 30 UNIT(S): 100 INJECTION, SOLUTION SUBCUTANEOUS at 23:05

## 2020-04-23 RX ADMIN — Medication 100 MILLIEQUIVALENT(S): at 09:30

## 2020-04-23 RX ADMIN — DONEPEZIL HYDROCHLORIDE 10 MILLIGRAM(S): 10 TABLET, FILM COATED ORAL at 21:49

## 2020-04-23 RX ADMIN — Medication 81 MILLIGRAM(S): at 14:04

## 2020-04-23 RX ADMIN — HEPARIN SODIUM 5000 UNIT(S): 5000 INJECTION INTRAVENOUS; SUBCUTANEOUS at 09:30

## 2020-04-23 RX ADMIN — HEPARIN SODIUM 5000 UNIT(S): 5000 INJECTION INTRAVENOUS; SUBCUTANEOUS at 21:49

## 2020-04-23 RX ADMIN — Medication 40 MILLIEQUIVALENT(S): at 03:28

## 2020-04-23 RX ADMIN — Medication 100 MILLIEQUIVALENT(S): at 03:28

## 2020-04-23 RX ADMIN — ONDANSETRON 4 MILLIGRAM(S): 8 TABLET, FILM COATED ORAL at 17:06

## 2020-04-23 RX ADMIN — PAMIDRONATE DISODIUM 43.33 MILLIGRAM(S): 9 INJECTION, SOLUTION INTRAVENOUS at 17:07

## 2020-04-23 RX ADMIN — SODIUM CHLORIDE 100 MILLILITER(S): 9 INJECTION, SOLUTION INTRAVENOUS at 03:38

## 2020-04-23 RX ADMIN — CEFTRIAXONE 100 MILLIGRAM(S): 500 INJECTION, POWDER, FOR SOLUTION INTRAMUSCULAR; INTRAVENOUS at 14:04

## 2020-04-23 RX ADMIN — Medication 4: at 14:31

## 2020-04-23 RX ADMIN — Medication 2: at 08:26

## 2020-04-23 RX ADMIN — CALCITONIN SALMON 280 INTERNATIONAL UNIT(S): 200 INJECTION, SOLUTION INTRAMUSCULAR at 17:06

## 2020-04-23 RX ADMIN — Medication 4 UNIT(S): at 17:30

## 2020-04-23 RX ADMIN — CEFEPIME 100 MILLIGRAM(S): 1 INJECTION, POWDER, FOR SOLUTION INTRAMUSCULAR; INTRAVENOUS at 15:10

## 2020-04-23 RX ADMIN — Medication 100 MILLIEQUIVALENT(S): at 07:12

## 2020-04-23 RX ADMIN — AZITHROMYCIN 500 MILLIGRAM(S): 500 TABLET, FILM COATED ORAL at 14:04

## 2020-04-23 RX ADMIN — SIMVASTATIN 20 MILLIGRAM(S): 20 TABLET, FILM COATED ORAL at 21:48

## 2020-04-23 RX ADMIN — Medication 4: at 17:25

## 2020-04-23 NOTE — PROGRESS NOTE ADULT - PROBLEM SELECTOR PLAN 1
Tachycardic, tachypneic, leukocytosis, bandemia, and lactic acidosis, concerning for sepsis due to superimposed PNA vs malignancy   - WBC count 29.16 without fevers concerning for malignant etiology  - Bcx pending, Ucx showing gram negative rods prelim read, awaiting final read  - d/c azithromycin and switched to cefepime 1g q12 (per ID recommendations) dosed renally given decreased GFR  -COVID PCR is negative x2, 3rd PCR sent, if negative call infection control for transfer to nonCovid floor Tachycardic, tachypneic, leukocytosis, bandemia, and lactic acidosis, concerning for sepsis due to superimposed PNA vs malignancy   - WBC count 29.16 without fevers concerning for malignant etiology  - Bcx pending, Ucx showing gram negative rods prelim read, awaiting final read  - d/c azithromycin and switched to cefepime 1g q12 (per ID recommendations) dosed renally given decreased GFR  -COVID PCR is negative x2, 3rd PCR sent, if negative call infection control for transfer to nonCovid floor  - Ucx Positive for GNR, currently on cefepime with gram negative coverage

## 2020-04-23 NOTE — CHART NOTE - NSCHARTNOTEFT_GEN_A_CORE
89 year old female with Left breast cancer Diagnosed in 4/2019 (ER25%, PR0%, Her2-) s/p lumpectomy on 5/15/2019. Recurrent left mass s/p resection on 9/4/2019. She was started on anastrozole in 9/2019. She also was found to have Lt axillary adenopathy. She has been asymptomatic and declined further work-ups or RT and currently on anastrozole.  Presented with back pain, poor oral intake, fatigue, and leukocytosis found in outpatient labs. Admitted for hypercalcemia, sepsis, left pleural effusion.     # Left breast cancer  Diagnosed in 4/2019 (ER25%, PR0%, Her2-) s/p lumpectomy on 5/15/2019. Recurrent left mass s/p resection on 9/4/2019. She was started on anastrozole in 9/2019.     She also was found to have Lt axillary adenopathy as well as possible lung mets in the last scans in 12/2019. Now presented with back pain and hypercalcemia as well as left pleural effusion. We need to reassess the current disease status.   - Recommend CT chest/abd/pel (without  con is fine due to her CKD) to understand the current disease status  - Hold anastrozole while in hospital  - F/U with Dr. Sadler after discharge    # Hypercalcemia  She does not have known bone mets in the last scan but she may have now.   - Hold off biosphophonate due to CKD  - IVF  - Appreciate endocrinology's recs  - F/U iPTH, PTHrp, vitaminD    The case was discussed with Dr. Balbir eDnt MD, MPH  Hematology/Oncology Fellow  Pager: [phone] 89 year old female with Left breast cancer Diagnosed in 4/2019 (ER25%, PR0%, Her2-) s/p lumpectomy on 5/15/2019. Recurrent left mass s/p resection on 9/4/2019. She was started on anastrozole in 9/2019. She also was found to have Lt axillary adenopathy. She has been asymptomatic and declined further work-ups or RT and currently on anastrozole.  Presented with back pain, poor oral intake, fatigue, and leukocytosis found in outpatient labs. Admitted for hypercalcemia, sepsis, left pleural effusion.     # Left breast cancer  Diagnosed in 4/2019 (ER25%, PR0%, Her2-) s/p lumpectomy on 5/15/2019. Recurrent left mass s/p resection on 9/4/2019. She was started on anastrozole in 9/2019.     She also was found to have Lt axillary adenopathy as well as possible lung mets in the last scans in 12/2019. Now presented with back pain and hypercalcemia as well as left pleural effusion. We need to reassess the current disease status.   - Recommend CT chest/abd/pel (without  con is fine due to her CKD) to understand the current disease status  - Hold anastrozole while in hospital  - F/U with Dr. Sadler after discharge    # Hypercalcemia  She does not have known bone mets in the last scan but she may have now.   - Hold off bisphosphonate due to CKD  - IVF  - Appreciate endocrinology's recs  - F/U iPTH, PTHrp, vitaminD    The case was discussed with Dr. Balbir Dent MD, MPH  Hematology/Oncology Fellow  Pager: [phone]    Case discussed with Dr Dent. I agree with his assessment and plan

## 2020-04-23 NOTE — PROGRESS NOTE ADULT - ASSESSMENT
89 yo F with Pmhx of breast cancer s/p lumpectomy and hormone therapy, DMII, HTN presents to the ED with complaints of weakness, loss of appetite, cough, SOB, and diarrhea, found to have tachycardia, hypoxia, leukocytosis, and b/l pleural opacities with L sided pleural effusion, concerning for COVID with superimposed PNA and UTI. 89 yo F with Pmhx of breast cancer s/p lumpectomy and hormone therapy, DMII, HTN presents to the ED with complaints of weakness, loss of appetite, cough, SOB, and diarrhea, found to have tachycardia, hypoxia, leukocytosis, and b/l pleural opacities with L sided pleural effusion, concerning for COVID with ? superimposed PNA and UTI.   pt is covid neg X2 and also found to have a hypercalcemia likely secondary to malignancy

## 2020-04-23 NOTE — CONSULT NOTE ADULT - PROBLEM SELECTOR RECOMMENDATION 5
Patient admitted yesterday from the ED with dyspnea, weakness and hypercalcemia. COVID x 2 negative and likely this is a result of sepsis. Spoke with granddaughter, Selene David. Family in agreement for DNR/DNI, although they would like to c/w current medical treatment and any reversible conditions. Patient had declined further treatment for her cancer. As per Selene, patient has children and a niece who are in agreement with this decision. Patient A&O, but as per resident may not have full understanding of condition. She is going to speak with patient and confirm the patient is ok with her granddaughter being the spokesperson for medical decisions. MOLST to be filled out after she speaks with her.

## 2020-04-23 NOTE — CONSULT NOTE ADULT - PROBLEM SELECTOR RECOMMENDATION 9
COVID negative x 2, however, x-ray finding suspicious for COVID infection. To be re-tested again. Patient with dyspnea and weakness, however also in the setting of renal failure and hypercalcemia.

## 2020-04-23 NOTE — PROGRESS NOTE ADULT - ATTENDING COMMENTS
89 yo F with Pmhx of breast cancer s/p lumpectomy and hormone therapy, DMII, HTN presents to the ED with complaints of weakness, loss of appetite, cough, SOB, and diarrhea, found to have tachycardia, hypoxia, leukocytosis, and b/l pleural opacities with L sided pleural effusion, concerning for COVID with ? superimposed PNA and UTI.   pt is covid neg X2 and also found to have a hypercalcemia likely secondary to malignancy     COVID suspicion- reswb once more, if negative, move to non covid floor, dc azithromycin for now   UTI- cefempime per ID   hypercalcemia- suspect malignancy related given Low PTH. calcitonin X3 doses, and dose pamindronate 60mg over 6 hrs   breast cancer- ?POD, do imaging, personally discussed case with Dr. Sadler, given age and likely POD address [place], now DNR as per d/w family  pleural effusion- imaging, follow pulm recccs, discuss drainage   hyperkalemia- likely due to overrpeleation follow the BMP tonight and treat if needed, liklely will wash out. EKG without peaked T waves   DM- lantus 30 and humalog 4-4-4 okay for now  DVT ppx; heparin due to decreased crcl for now. 89 yo F with Pmhx of breast cancer s/p lumpectomy and hormone therapy, DMII, HTN presents to the ED with complaints of weakness, loss of appetite, cough, SOB, and diarrhea, found to have tachycardia, hypoxia, leukocytosis, and b/l pleural opacities with L sided pleural effusion, concerning for COVID with ? superimposed PNA and UTI.   pt is covid neg X2 and also found to have a hypercalcemia likely secondary to malignancy     COVID suspicion- reswb once more, if negative, move to non covid floor, dc azithromycin for now   UTI- cefempime per ID   hypercalcemia- suspect malignancy related given Low PTH. calcitonin X3 doses, and dose pamindronate 60mg over 6 hrs   breast cancer- ?POD, do imaging, personally discussed case with Dr. Sadler, given age and likely POD address [place], now DNR as per d/w family  pleural effusion- imaging, follow pulm recccs, discuss drainage   hyperkalemia- likely due to overrpeleation follow the BMP tonight and treat if needed, liklely will wash out. EKG without peaked T waves   DM- lantus 30 and humalog 4-4-4 okay for now  DVT ppx; heparin due to decreased crcl for now.    Diagnosis Codes:   J96.01 Acute respiratory failure with hypoxia;   U07.1 2019 Novel Coronavirus (COVID-19);   J12.89 Other viral pneumonia  CPT Code: Moderate Complexity 05339

## 2020-04-23 NOTE — PROGRESS NOTE ADULT - SUBJECTIVE AND OBJECTIVE BOX
Patient is a 89y old  Female who presents with a chief complaint of Weakness, diarrhea, and cough (2020 15:33)      SUBJECTIVE / OVERNIGHT EVENTS: Patient still weak this AM. Sating that she does not have any energy.     MEDICATIONS  (STANDING):  aspirin  chewable 81 milliGRAM(s) Oral daily  calcitonin Injectable 280 International Unit(s) IntraMuscular every 12 hours  cefepime   IVPB      dextrose 5%. 1000 milliLiter(s) (50 mL/Hr) IV Continuous <Continuous>  dextrose 50% Injectable 12.5 Gram(s) IV Push once  dextrose 50% Injectable 25 Gram(s) IV Push once  dextrose 50% Injectable 25 Gram(s) IV Push once  donepezil 10 milliGRAM(s) Oral at bedtime  heparin   Injectable 5000 Unit(s) SubCutaneous every 12 hours  insulin glargine Injectable (LANTUS) 32 Unit(s) SubCutaneous at bedtime  insulin lispro (HumaLOG) corrective regimen sliding scale   SubCutaneous at bedtime  insulin lispro (HumaLOG) corrective regimen sliding scale   SubCutaneous three times a day before meals  insulin lispro Injectable (HumaLOG) 4 Unit(s) SubCutaneous three times a day before meals  ondansetron Injectable 4 milliGRAM(s) IV Push once  pamidronate IVPB 60 milliGRAM(s) IV Intermittent once  simvastatin 20 milliGRAM(s) Oral at bedtime    MEDICATIONS  (PRN):  dextrose 40% Gel 15 Gram(s) Oral once PRN Blood Glucose LESS THAN 70 milliGRAM(s)/deciliter  glucagon  Injectable 1 milliGRAM(s) IntraMuscular once PRN Glucose LESS THAN 70 milligrams/deciliter      Vital Signs Last 24 Hrs  T(C): 36.3 (2020 12:45), Max: 37 (2020 20:38)  T(F): 97.4 (2020 12:45), Max: 98.6 (2020 20:38)  HR: 91 (2020 14:28) (79 - 91)  BP: 158/70 (2020 14:28) (96/61 - 178/78)  BP(mean): --  RR: 18 (2020 12:45) (14 - 18)  SpO2: 95% (2020 12:45) (95% - 100%)  CAPILLARY BLOOD GLUCOSE      POCT Blood Glucose.: 242 mg/dL (2020 14:11)  POCT Blood Glucose.: 247 mg/dL (2020 12:06)  POCT Blood Glucose.: 229 mg/dL (2020 08:04)  POCT Blood Glucose.: 300 mg/dL (2020 21:45)  POCT Blood Glucose.: 382 mg/dL (2020 18:11)    I&O's Summary    2020 07:  -  2020 07:00  --------------------------------------------------------  IN: 600 mL / OUT: 300 mL / NET: 300 mL    2020 07:  -  2020 15:58  --------------------------------------------------------  IN: 120 mL / OUT: 100 mL / NET: 20 mL        PHYSICAL EXAM:  GENERAL: on 2L NC, elderly woman, tired appearing   HEAD:  Atraumatic, Normocephalic  EYES: EOMI, PERRLA, conjunctiva and sclera clear  CHEST/LUNG: Decreased Breath sounds on left compared to right, No wheeze  HEART: Regular rate and rhythm; Systolic murmur noted on exam, no rubs or gallops  ABDOMEN: Soft, Nontender, Nondistended; Bowel sounds present  EXTREMITIES:  2+ Peripheral Pulses, No edema  PSYCH: AAOx2      LABS:                        13.5   29.66 )-----------( 385      ( 2020 09:59 )             41.1     04-23    137  |  99  |  58<H>  ----------------------------<  249<H>  6.1<H>   |  20<L>  |  2.21<H>    Ca    12.8<H>      2020 12:06    TPro  6.9  /  Alb  3.1<L>  /  TBili  0.4  /  DBili  x   /  AST  23  /  ALT  14  /  AlkPhos  66  04-23          Urinalysis Basic - ( 2020 13:36 )    Color: Yellow / Appearance: Turbid / S.020 / pH: x  Gluc: x / Ketone: Negative  / Bili: Negative / Urobili: Negative   Blood: x / Protein: 30 mg/dL / Nitrite: Negative   Leuk Esterase: Large / RBC: 5 /hpf / WBC >50   Sq Epi: x / Non Sq Epi: 3 / Bacteria: Many        Culture - Urine (collected 2020 18:17)  Source: .Urine Clean Catch (Midstream)  Preliminary Report (2020 15:33):    >100,000 CFU/ml Gram Negative Rods      < from: Xray Chest 1 View- PORTABLE-Urgent (20 @ 13:01) >  IMPRESSION:    Large left pleural effusion. Left apical infiltrate.    Diffuse infiltrate in the right upper and lower lungs    < end of copied text >

## 2020-04-23 NOTE — PROGRESS NOTE ADULT - PROBLEM SELECTOR PLAN 9
DVT-enoxaparin 40 mg   Diet-DASH/consistent carbs  Transitions of Care Status:  1.  Name of PCP: Dr. Sadler   2.  PCP Contacted on Admission: [x ] Y    [ ] N    3.  PCP contacted at Discharge: [ ] Y    [ ] N    [ ] N/A  4.  Post-Discharge Appointment Date and Location:  5.  Summary of Handoff given to PCP: DVT- enoxaparin 40 mg   Diet-DASH/consistent carbs  Transitions of Care Status:  1.  Name of PCP: Dr. Sadler   2.  PCP Contacted on Admission: [x ] Y    [ ] N    3.  PCP contacted at Discharge: [ ] Y    [ ] N    [ ] N/A  4.  Post-Discharge Appointment Date and Location:  5.  Summary of Handoff given to PCP:

## 2020-04-23 NOTE — PROGRESS NOTE ADULT - PROBLEM SELECTOR PLAN 4
Patient has hypoxic respiratory failure   -COVID PCR negative x2, 3rd pending; if patient will need to be transferred   - stable % on 2L NC

## 2020-04-23 NOTE — CONSULT NOTE ADULT - PROBLEM SELECTOR RECOMMENDATION 9
hypercalcemia corrected to 13.6 and symptomatic with constipation and AMS. Etiology currently unclear but suspect malignancy given hx of breast ca  with possible mets to lungs in the past and pleural effusion.   -f/u PTH, PTHrP, vitamin D, TSH  -start calcitonin 4 units/kg q12h for total of 4 doses   -if 25 vitamin D is normal, can consider denosumab   -IVF currently on hold given large pleural effusion hypercalcemia corrected to 13.6 and symptomatic with constipation and AMS. Non-PTH mediated hypercalcemia with unclear etiology but suspect malignancy given hx of breast ca  with possible mets to lungs in the past and pleural effusion.   -f/u PTHrP, 1,25 vitamin D  -start calcitonin 4 units/kg q12h for total of 4 doses   -if 25 vitamin D is normal, can consider denosumab   -IVF currently on hold given large pleural effusion hypercalcemia corrected to 13.6 and symptomatic with constipation and AMS. Non-PTH mediated hypercalcemia with unclear etiology but suspect malignancy given hx of breast ca  with possible mets to lungs in the past and pleural effusion.   -f/u PTHrP, 1,25 vitamin D  -start calcitonin 4 units/kg q12h for total of 4 doses   -if 25 vitamin D is normal, can consider denosumab especially gven GFR of 18  -IVF currently on hold given large pleural effusion

## 2020-04-23 NOTE — PROGRESS NOTE ADULT - SUBJECTIVE AND OBJECTIVE BOX
Pulmonary Consult Note     asked to evaluate regarding dyspnea with large left pleural effusion      89 yo woman with  weakness, cough, SOB, and diarrhea for the last 4 weeks.      She has Pmhx of breast cancer s/p lumpectomy and hormone therapy, DMII, HTN. She has no known COVID exposure. She was recently diagnosed with UTI and was treated with abx for 7 days. His oncologist obtained labs as outpatient and found that she had elevated WBC.   In the ER,  SO2 99 on NC 2L. Her labs were significant for leukocytosis, lymphopenia, bandemia, thrombocytosi    UA showed large LE, nitrate, and bacteria.       CXR lerge left pleural effusion    prior CT < from: CT Chest No Cont (19 @ 15:22) >    Left breast changes and left axillary lymphadenopathy, in keeping with   the clinical history of breast cancer.    Bilateral pulmonary nodules largest measuring up to 8 mm in the right   lower lobe, concerning for metastases.    No  pleural effusion        Hx limited as pt is debilitated unable to answer more than simple questions    ICU Vital Signs Last 24 Hrs  T(C): 36.3 (2020 12:45), Max: 37 (2020 20:38)  T(F): 97.4 (2020 12:45), Max: 98.6 (2020 20:38)  HR: 91 (2020 14:28) (79 - 91)  BP: 158/70 (2020 14:28) (96/61 - 178/78)  BP(mean): --  ABP: --  ABP(mean): --  RR: 18 (2020 12:45) (14 - 18)  SpO2: 95% (2020 12:45) (95% - 100%)    debilitated    lungs severely diminished on left unlabored respirations  cor rrr    chest wall sp left mastectomy with reconstruction, hyperpigmentation of skin  abd nt soft no hsm  extr no edema   alert  confused    DATA                         13.5   29.66 )-----------( 385      ( 2020 09:59 )             41.1     04-    137  |  99  |  58<H>  ----------------------------<  249<H>  6.1<H>   |  20<L>  |  2.21<H>    Ca    12.8<H>      2020 12:06    TPro  6.9  /  Alb  3.1<L>  /  TBili  0.4  /  DBili  x   /  AST  23  /  ALT  14  /  AlkPhos  66  04-      Urinalysis Basic - ( 2020 13:36 )    Color: Yellow / Appearance: Turbid / S.020 / pH: x  Gluc: x / Ketone: Negative  / Bili: Negative / Urobili: Negative   Blood: x / Protein: 30 mg/dL / Nitrite: Negative   Leuk Esterase: Large / RBC: 5 /hpf / WBC >50   Sq Epi: x / Non Sq Epi: 3 / Bacteria: Many        IMPRESSION     large left pleural effusion in elderly debilitated pt with history of lung nodules, presumed metastatic from breast cancer    There is leucocytosis with suggestion of underlying infection    possibility of empyema exists though leucocytosis  may be due to UTI    Extent of pleural effusion not evident based on portable CXR    RECOMMEND     Please obtain NC CT chest to better evaluate    obtain details of prior history    If CT chest demonstrates/confirms large simple effusion then IR guided drainage is indicated.  Given large size small chest tube placement is preferred to enable gradual drainage    If effusion appears loculated then further measures for drainage will need to be discussed with IR and thoracic surgery    Treat for UTI in interim    nasal o2  thanks    Steve Perez MD  Pulmonary

## 2020-04-23 NOTE — CONSULT NOTE ADULT - ATTENDING COMMENTS
Discussed with NP Thelma; patient with metastatic breast cancer, palliative involved with goals of care. ongoing discussion with patient and family at this time. code status discussion today, DNR/DNI in place. will continue to follow pending clinical course
Agree with assessment and plan as above by Dr. Sadler. Reviewed all pertinent labs, glucose values, and imaging studies. Modifications made as indicated above. Avoiding IVF given pleural effusions. Can dose calcitonin given corrected calcium >13 but need to bridge with longer term med such as denosumab. Bisphosphonates would be a good choice but not with her GFR of 18 prefer denosumab. Hypercalcemia likely non-PTH mediated from malignancy but exacerbated by dehydration. r/o 1,25 mediated causes and follow-up PTHrp. TFTs normal Low likelihood of AI or vitamin A toxicity. Hold standing calcium supplementation. Balance out insulin a bit. Decrease Lantus to 30. Monitor with Humalog 4 if not eating much. Once appetite improves will need closer to Humalog 8 with meals. Goal glucose 100-200 given age.     Yony Barrett D.O  [phone]

## 2020-04-23 NOTE — CONSULT NOTE ADULT - PROBLEM SELECTOR RECOMMENDATION 3
Likely 2/2 to metastatic breast cancer. Patient to receive CT chest and abdomen to evaluate for further metastases. Unable to receive bisphosphonates 2/2 to [facility].

## 2020-04-23 NOTE — PROGRESS NOTE ADULT - PROBLEM SELECTOR PLAN 7
Lactic acidosis with elevated AG   -Most likely due to sepsis and lactic acidosis Lactic acidosis with elevated AG   -Most likely due to sepsis and lactic acidosis  - repeat lactate in the AM to assess   - avoid IV fluids given large pleural effusion

## 2020-04-23 NOTE — CONSULT NOTE ADULT - PROBLEM SELECTOR RECOMMENDATION 2
History of CKD Stage III and now with rising BUN/Creatinine and hyperkalemia. May be 2/2 to sepsis from UTI and/or pneumonia. Patient was also receiving oral chemo for metastatic breast cancer.

## 2020-04-23 NOTE — CONSULT NOTE ADULT - ASSESSMENT
89 yo F with Pmhx of breast cancer s/p lumpectomy and hormone therapy, DMII, HTN presents to the ED with complaints of weakness, loss of appetite, cough, SOB, and diarrhea. Admitted for possible UTI sepsis, hypercalcemia and pleural effusion. 89 yo F with Pmhx of breast cancer s/p lumpectomy and hormone therapy, DMII, HTN presents to the ED with complaints of weakness, loss of appetite, cough, SOB, and diarrhea. Admitted for possible UTI sepsis, hypercalcemia and pleural effusion (high risk patient with severely uncontrolled diabetes with A1c 10% with severe hypercalcemia with multiple complications at this time with possible COVID high risk for CAD and CVA with high level decision-making).

## 2020-04-23 NOTE — CONSULT NOTE ADULT - ASSESSMENT
89 yo F with Pmhx of breast cancer s/p lumpectomy and hormone therapy, DMII, HTN presents to the ED with complaints of weakness, loss of appetite, cough, SOB, and diarrhea, found to have tachycardia, hypoxia, leukocytosis, and b/l pleural opacities with L sided pleural effusion, concerning for COVID with superimposed PNA and UTI.     Palliative team called to discuss GOC in the setting of possible COVID 19 infection and metastatic breast cancer.

## 2020-04-23 NOTE — CONSULT NOTE ADULT - PROBLEM SELECTOR RECOMMENDATION 4
consider discontinuing statin given age 89    Jorge L Sadler MD, Endocrine Fellow   Pager  from 9-5PM. After hours and on weekends please call [phone]

## 2020-04-23 NOTE — PROGRESS NOTE ADULT - PROBLEM SELECTOR PLAN 5
- decreased to 35 U lantus with 4 units premeal per Endo given elevated blood sugars - decreased to 30 U lantus with 4 units premeal per Endo given elevated blood sugars

## 2020-04-23 NOTE — PROGRESS NOTE ADULT - PROBLEM SELECTOR PLAN 2
- Repeat elevated to 12.5, concern for HyperCA of malignancy givne hx of Brest cancer and large left pleural effusion   - started on calctonin 4mg/kg for total of 4 dosee  - started on pamidronate 60 mg running for 6 hours given decreased GFR/CKD  - will trend BMP q8 to assess treatment resolution, Endo following   - Onc following, recommending CT Chest/A/P to assess for possible mets given clincal findings and hx of malignancy

## 2020-04-23 NOTE — PROVIDER CONTACT NOTE (CRITICAL VALUE NOTIFICATION) - ACTION/TREATMENT ORDERED:
MD MAde aware. repeat labs ordered and to be drawn. Will contuine to monitor.
k replacements.     K infusing w/ lr ,as tolerated.  po admin,

## 2020-04-23 NOTE — CONSULT NOTE ADULT - PROBLEM SELECTOR RECOMMENDATION 2
hx of diabetes with worsening glycemic control for the past month due to recurrent UTI infection according to the daughter.   -f/u hgb a1c   -decrease lantus to 32 units qhs  -start humalog 4 units tidac (hold if NPO)  -moderate dose sliding scale tidac and low bedtime scale    discharge  Basal + oral (prandin) depending on nutritional status and insulin requirement  f/u cindy PCP hx of diabetes with worsening glycemic control for the past month due to recurrent UTI infection according to the daughter.   -f/u hgb a1c   -decrease lantus to 30 units qhs  -start humalog 4 units tidac (hold if NPO)  -moderate dose sliding scale tidac and low bedtime scale    discharge  Basal + oral (prandin) depending on nutritional status and insulin requirement  f/u cindy PCP

## 2020-04-23 NOTE — CONSULT NOTE ADULT - SUBJECTIVE AND OBJECTIVE BOX
HPI:  89 yo F with Pmhx of breast cancer s/p lumpectomy and hormone therapy, DMII, HTN presents to the ED with complaints of weakness, loss of appetite, cough, SOB, and diarrhea. Admitted for possible UTI sepsis, hypercalcemia and pleural effusion.     Endocrine History: Hx obtained mainly from granddaughter and daughter who lives with her.   Danelle Alex Daughter [phone]   T2DM for many years with hgb a1c of 8.0 (2019) on Basaglar 40 units qAM and 30 units qPM. Her glucoses are usually 190-300s at home for the past month but it was in the usually in the low 100s prior to this past month. No hypoglycemic events at home. No known complications of diabetes.     No hx of hypercalcemia and calcium had been in the 9-10 range in 2019. Hx of breast ca Takes calcium 500 mg supplements BID at home. Recently starting 5-6 weeks ago she started becoming more constipated with increased abd pain and more confused than usual.      PAST MEDICAL & SURGICAL HISTORY:  Kidney disease: pt states, has 30 % kidney function - denies having nephrologist. Pt reports elevated K+ last year, normal level maintained with diet restrictions  H/O gastroesophageal reflux (GERD)  Hyperlipidemia  DM (diabetes mellitus): type 2  Hypertension  H/O lumpectomy: left breast 5/2019  S/P cholecystectomy: 2016  S/P hysterectomy: at age 40    FAMILY HISTORY:  FH: breast cancer: granddaughter  FH: diabetes mellitus: sister  Family history of rectal cancer: brother      Social History:   No history of tobacco, etoh or illicit drug use. Lives with her daughter and aide at home.     Outpatient Medications: Home Medications:  acetaminophen 325 mg oral tablet: 1 tab(s) orally every 6 hours, As Needed (22 Apr 2020 17:30)  amLODIPine 10 mg oral tablet: 1 tab(s) orally once a day (22 Apr 2020 17:19)  Artificial Tears ophthalmic solution: 1 drop(s) to each affected eye 2 times a day, As Needed (22 Apr 2020 17:30)  aspirin 81 mg oral tablet: 1 tab(s) orally once a day (22 Apr 2020 17:30)  Basaglar KwikPen 100 units/mL subcutaneous solution: 40 unit(s) subcutaneous once a day (in the morning) &amp; 30 unit(s) in the evening (22 Apr 2020 17:30)  Calcium 500+D: 1 tab(s) orally 2 times a day (22 Apr 2020 17:30)  donepezil 10 mg oral tablet: 1 tab(s) orally once a day (at bedtime) (22 Apr 2020 17:30)  furosemide 20 mg oral tablet: 1 tab(s) orally every other day (22 Apr 2020 17:30)  hydrALAZINE 100 mg oral tablet: 1 tab(s) orally 2 times a day (22 Apr 2020 17:30)  metoprolol succinate 50 mg oral tablet, extended release: 1 tab(s) orally once a day (in the evening) (22 Apr 2020 17:30)  MiraLax oral powder for reconstitution: 17 gram(s) orally 2 times a day (22 Apr 2020 17:30)  omeprazole 20 mg oral delayed release capsule: 1 cap(s) orally once a day (22 Apr 2020 17:30)  simvastatin 20 mg oral tablet: 1 tab(s) orally once a day (at bedtime) (22 Apr 2020 17:30)  vitamin E oral capsule:  (22 Apr 2020 17:30)      MEDICATIONS  (STANDING):  aspirin  chewable 81 milliGRAM(s) Oral daily  azithromycin   Tablet 500 milliGRAM(s) Oral daily  cefTRIAXone   IVPB 1000 milliGRAM(s) IV Intermittent every 24 hours  dextrose 5%. 1000 milliLiter(s) (50 mL/Hr) IV Continuous <Continuous>  dextrose 50% Injectable 12.5 Gram(s) IV Push once  dextrose 50% Injectable 25 Gram(s) IV Push once  dextrose 50% Injectable 25 Gram(s) IV Push once  donepezil 10 milliGRAM(s) Oral at bedtime  heparin   Injectable 5000 Unit(s) SubCutaneous every 12 hours  insulin glargine Injectable (LANTUS) 35 Unit(s) SubCutaneous at bedtime  insulin lispro (HumaLOG) corrective regimen sliding scale   SubCutaneous three times a day before meals  potassium chloride  10 mEq/100 mL IVPB 10 milliEquivalent(s) IV Intermittent every 1 hour  simvastatin 20 milliGRAM(s) Oral at bedtime    MEDICATIONS  (PRN):  dextrose 40% Gel 15 Gram(s) Oral once PRN Blood Glucose LESS THAN 70 milliGRAM(s)/deciliter  glucagon  Injectable 1 milliGRAM(s) IntraMuscular once PRN Glucose LESS THAN 70 milligrams/deciliter      Allergies    No Known Allergies    Intolerances      Review of Systems: as per HPI       PHYSICAL EXAM:  VITALS: T(C): 36.7 (04-23-20 @ 06:05)  T(F): 98.1 (04-23-20 @ 06:05), Max: 98.7 (04-22-20 @ 12:17)  HR: 80 (04-23-20 @ 06:05) (80 - 102)  BP: 133/79 (04-23-20 @ 06:05) (96/61 - 154/93)  RR:  (14 - 22)  SpO2:  (89% - 100%)  Wt(kg): --    POCT Blood Glucose.: 229 mg/dL (04-23-20 @ 08:04)H+2  POCT Blood Glucose.: 300 mg/dL (04-22-20 @ 21:45)L35  POCT Blood Glucose.: 382 mg/dL (04-22-20 @ 18:11)H+5                            13.5   29.66 )-----------( 385      ( 23 Apr 2020 09:59 )             41.1       04-23    133<L>  |  99  |  56<H>  ----------------------------<  253<H>  6.2<HH>   |  20<L>  |  2.25<H>    EGFR if : 20<L>  EGFR if non : 17<L>    Ca    12.8<H>      04-23    TPro  6.9  /  Alb  3.1<L>  /  TBili  0.4  /  DBili  x   /  AST  23  /  ALT  14  /  AlkPhos  66  04-23      Thyroid Function Tests:            Radiology: HPI:  89 yo F with Pmhx of breast cancer s/p lumpectomy and hormone therapy, DMII, HTN presents to the ED with complaints of weakness, loss of appetite, cough, SOB, and diarrhea. Admitted for possible UTI sepsis, hypercalcemia and pleural effusion.     Endocrine History: Hx obtained mainly from granddaughter and daughter who lives with her.   Danelle Alex Daughter [phone]   T2DM for many years with hgb a1c of 8.0 (2019) on Basaglar 40 units qAM and 30 units qPM. Her glucoses are usually 190-300s at home for the past month but it was in the usually in the low 100s prior to this past month. No hypoglycemic events at home. No known complications of diabetes.     No hx of hypercalcemia and calcium had been in the 9-10 range in 2019. Hx of breast ca Takes calcium 500 mg supplements BID at home. Recently starting 5-6 weeks ago she started becoming more constipated with increased abd pain and more confused than usual.      PAST MEDICAL & SURGICAL HISTORY:  Kidney disease: pt states, has 30 % kidney function - denies having nephrologist. Pt reports elevated K+ last year, normal level maintained with diet restrictions  H/O gastroesophageal reflux (GERD)  Hyperlipidemia  DM (diabetes mellitus): type 2  Hypertension  H/O lumpectomy: left breast 5/2019  S/P cholecystectomy: 2016  S/P hysterectomy: at age 40    FAMILY HISTORY:  FH: breast cancer: granddaughter  FH: diabetes mellitus: sister  Family history of rectal cancer: brother      Social History:   No history of tobacco, etoh or illicit drug use. Lives with her daughter and aide at home.     Outpatient Medications: Home Medications:  acetaminophen 325 mg oral tablet: 1 tab(s) orally every 6 hours, As Needed (22 Apr 2020 17:30)  amLODIPine 10 mg oral tablet: 1 tab(s) orally once a day (22 Apr 2020 17:19)  Artificial Tears ophthalmic solution: 1 drop(s) to each affected eye 2 times a day, As Needed (22 Apr 2020 17:30)  aspirin 81 mg oral tablet: 1 tab(s) orally once a day (22 Apr 2020 17:30)  Basaglar KwikPen 100 units/mL subcutaneous solution: 40 unit(s) subcutaneous once a day (in the morning) &amp; 30 unit(s) in the evening (22 Apr 2020 17:30)  Calcium 500+D: 1 tab(s) orally 2 times a day (22 Apr 2020 17:30)  donepezil 10 mg oral tablet: 1 tab(s) orally once a day (at bedtime) (22 Apr 2020 17:30)  furosemide 20 mg oral tablet: 1 tab(s) orally every other day (22 Apr 2020 17:30)  hydrALAZINE 100 mg oral tablet: 1 tab(s) orally 2 times a day (22 Apr 2020 17:30)  metoprolol succinate 50 mg oral tablet, extended release: 1 tab(s) orally once a day (in the evening) (22 Apr 2020 17:30)  MiraLax oral powder for reconstitution: 17 gram(s) orally 2 times a day (22 Apr 2020 17:30)  omeprazole 20 mg oral delayed release capsule: 1 cap(s) orally once a day (22 Apr 2020 17:30)  simvastatin 20 mg oral tablet: 1 tab(s) orally once a day (at bedtime) (22 Apr 2020 17:30)  vitamin E oral capsule:  (22 Apr 2020 17:30)      MEDICATIONS  (STANDING):  aspirin  chewable 81 milliGRAM(s) Oral daily  azithromycin   Tablet 500 milliGRAM(s) Oral daily  cefTRIAXone   IVPB 1000 milliGRAM(s) IV Intermittent every 24 hours  dextrose 5%. 1000 milliLiter(s) (50 mL/Hr) IV Continuous <Continuous>  dextrose 50% Injectable 12.5 Gram(s) IV Push once  dextrose 50% Injectable 25 Gram(s) IV Push once  dextrose 50% Injectable 25 Gram(s) IV Push once  donepezil 10 milliGRAM(s) Oral at bedtime  heparin   Injectable 5000 Unit(s) SubCutaneous every 12 hours  insulin glargine Injectable (LANTUS) 35 Unit(s) SubCutaneous at bedtime  insulin lispro (HumaLOG) corrective regimen sliding scale   SubCutaneous three times a day before meals  potassium chloride  10 mEq/100 mL IVPB 10 milliEquivalent(s) IV Intermittent every 1 hour  simvastatin 20 milliGRAM(s) Oral at bedtime    MEDICATIONS  (PRN):  dextrose 40% Gel 15 Gram(s) Oral once PRN Blood Glucose LESS THAN 70 milliGRAM(s)/deciliter  glucagon  Injectable 1 milliGRAM(s) IntraMuscular once PRN Glucose LESS THAN 70 milligrams/deciliter      Allergies    No Known Allergies    Intolerances      Review of Systems: as per HPI       PHYSICAL EXAM:  VITALS: T(C): 36.7 (04-23-20 @ 06:05)  T(F): 98.1 (04-23-20 @ 06:05), Max: 98.7 (04-22-20 @ 12:17)  HR: 80 (04-23-20 @ 06:05) (80 - 102)  BP: 133/79 (04-23-20 @ 06:05) (96/61 - 154/93)  RR:  (14 - 22)  SpO2:  (89% - 100%)  Wt(kg): --  Deferred. Please refer to Hospitalist PE      POCT Blood Glucose.: 229 mg/dL (04-23-20 @ 08:04)H+2  POCT Blood Glucose.: 300 mg/dL (04-22-20 @ 21:45)L35  POCT Blood Glucose.: 382 mg/dL (04-22-20 @ 18:11)H+5                            13.5   29.66 )-----------( 385      ( 23 Apr 2020 09:59 )             41.1       04-23    133<L>  |  99  |  56<H>  ----------------------------<  253<H>  6.2<HH>   |  20<L>  |  2.25<H>    EGFR if : 20<L>  EGFR if non : 17<L>    Ca    12.8<H>      04-23    TPro  6.9  /  Alb  3.1<L>  /  TBili  0.4  /  DBili  x   /  AST  23  /  ALT  14  /  AlkPhos  66  04-23      Thyroid Function Tests:            Radiology:

## 2020-04-23 NOTE — PROGRESS NOTE ADULT - PROBLEM SELECTOR PLAN 8
UA is positive for bacteria, LE, and nitrate   -Ucx is pending   -Patient is symptomatic with urinary incontinent and dysuria UA is positive for bacteria, LE, and nitrate    UCx postitive for GNR on prelim read  - patient currentyl on cefepime 1g q12   - Patient was symptomatic on admission with urinary incontinent and dysuria

## 2020-04-23 NOTE — CONSULT NOTE ADULT - PROBLEM SELECTOR RECOMMENDATION 4
Likely 2/2 to metastatic breast cancer vs. from pneumonia or renal failure. Arrived to the ED with dyspnea which is improved at this time. Oxygen saturation stable on nasal cannula. Patient comfortable as per residentNguyễn.

## 2020-04-23 NOTE — PROGRESS NOTE ADULT - PROBLEM SELECTOR PLAN 3
L sided pleural effusion, hx of breast cancer with left sided lumpectomy   - PTH less than 4 likely correlated to malignancy, but cannot rule out infection  - started on cefepime 1g q12 hours for possible infection, per ID recommendations   - COVID does not usually cause pleural effusion   - Pulm consulted recommending CT chest to characterize effusion, concern for complicated effusion vs empyema based on CXR findings  - IR consulted for possible pigtail placement for drainage, will follow

## 2020-04-23 NOTE — CONSULT NOTE ADULT - PROBLEM SELECTOR RECOMMENDATION 6
See above. Awaiting to hear back from resident, Nguyễn if patient is ok with her granddaughter assisting with medical decisions and for DNR/DNI. Patient with history of metastatic breast cancer and had refused further treatment. Granddaughter aware that at patient's age intubation and resuscitation would likely prolong suffering and they would not the patient to undergo that.

## 2020-04-23 NOTE — CONSULT NOTE ADULT - SUBJECTIVE AND OBJECTIVE BOX
HPI:  89 yo F with Pmhx of breast cancer s/p lumpectomy and hormone therapy, DMII, HTN presents to the ED with complaints of weakness, loss of appetite, cough, SOB, and diarrhea. Patient has been having the symptoms for the last 4 weeks. She has been having decreased PO intake. She also has been becoming progressively weak and as a result, patient was sent to the ED. She has no known COVID exposure. She was recently diagnosed with UTI and was treated with abx for 7 days. His oncologist obtained labs as outpatient and found that she had elevated WBC.   In the vitals were: Tmax 98.7 F, BP 90s, -150s/80-90s mmHg, RR 20, SO2 99 on NC 2L. Her labs were significant for leukocytosis, lymphopenia, bandemia, thrombocytosis, elevated AG, hyperglycemia, lactic acidosis, hypercalcemia, hypercalcemia. UA showed large LE, nitrate, and bacteria. (2020 16:38)    PERTINENT PM/SXH:   Kidney disease  H/O gastroesophageal reflux (GERD)  Malignant hypertension w/o heart disease, w/o chronic kidney disease  Hyperlipidemia  DM (diabetes mellitus)  Hypertension    H/O lumpectomy  S/P cholecystectomy  S/P hysterectomy    FAMILY HISTORY:  FH: breast cancer: granddaughter  FH: diabetes mellitus: sister  Family history of rectal cancer: brother    ITEMS NOT CHECKED ARE NOT PRESENT    SOCIAL HISTORY:   Significant other/partner:  [x ]  Children:  [ ]  Sikhism/Spirituality:  Substance hx:  [ ]   Tobacco hx:  [ ]   Alcohol hx: [ ]   Home Opioid hx:  [ ] I-Stop Reference No:  Living Situation: [x ]Home-lives with her niece  [ ]Long term care  [ ]Rehab [ ]Other    ADVANCE DIRECTIVES:    DNR  Yes  MOLST  [x ]  Living Will  [ ]   DECISION MAKER(s):  [ ] Health Care Proxy(s) [ ] Surrogate(s): Resident caring for patient confirming patient is ok with granddaughterSelene being the spokesperson and decision maker  [ ] Guardian           Name(s): Phone Number(s): marquita Hernandez [phone]    BASELINE (I)ADL(s) (prior to admission):  [place]: [ ]Total  [x ] Moderate [ ]Dependent    Allergies    No Known Allergies    Intolerances    MEDICATIONS  (STANDING):  aspirin  chewable 81 milliGRAM(s) Oral daily  azithromycin   Tablet 500 milliGRAM(s) Oral daily  cefTRIAXone   IVPB 1000 milliGRAM(s) IV Intermittent every 24 hours  dextrose 5%. 1000 milliLiter(s) (50 mL/Hr) IV Continuous <Continuous>  dextrose 50% Injectable 12.5 Gram(s) IV Push once  dextrose 50% Injectable 25 Gram(s) IV Push once  dextrose 50% Injectable 25 Gram(s) IV Push once  dextrose 50% Injectable 50 milliLiter(s) IV Push once  donepezil 10 milliGRAM(s) Oral at bedtime  heparin   Injectable 5000 Unit(s) SubCutaneous every 12 hours  insulin glargine Injectable (LANTUS) 32 Unit(s) SubCutaneous at bedtime  insulin lispro (HumaLOG) corrective regimen sliding scale   SubCutaneous at bedtime  insulin lispro (HumaLOG) corrective regimen sliding scale   SubCutaneous three times a day before meals  insulin lispro Injectable (HumaLOG) 4 Unit(s) SubCutaneous three times a day before meals  insulin regular  human recombinant 5 Unit(s) IV Push once  simvastatin 20 milliGRAM(s) Oral at bedtime    MEDICATIONS  (PRN):  dextrose 40% Gel 15 Gram(s) Oral once PRN Blood Glucose LESS THAN 70 milliGRAM(s)/deciliter  glucagon  Injectable 1 milliGRAM(s) IntraMuscular once PRN Glucose LESS THAN 70 milligrams/deciliter    Due to COVID 19 and in effort to decrease provider exposure and use of PPE, H&P, ROS and/or PE was taken from primary team's note.    PRESENT SYMPTOMS: [ ]Unable to obtain due to poor mentation   Source if other than patient:  [ ]Family   [ ]Team     Pain: [ ] yes [ ] no  QOL impact -   Location -                    Aggravating factors -  Quality -  Radiation -  Timing-  Severity (0-10 scale):  Minimal acceptable level (0-10 scale):     PAIN AD Score:     [email or web address] (press ctrl +  left click to view)    Due to COVID 19, ROS per primary team    Dyspnea:                           [ ]Mild [ ]Moderate [ ]Severe  Anxiety:                             [ ]Mild [ ]Moderate [ ]Severe  Fatigue:                             [ ]Mild [ ]Moderate [ ]Severe  Nausea:                             [ ]Mild [ ]Moderate [ ]Severe  Loss of appetite:              [ ]Mild [ ]Moderate [ ]Severe  Constipation:                    [ ]Mild [ ]Moderate [ ]Severe    Other Symptoms:  [ ]All other review of systems negative     Karnofsky Performance Score/Palliative Performance Status Version 2:         %    http://npcrc.org/files/news/palliative_performance_scale_ppsv2.pdf  PHYSICAL EXAM:  Vital Signs Last 24 Hrs  T(C): 36.7 (2020 06:05), Max: 37 (2020 20:38)  T(F): 98.1 (2020 06:05), Max: 98.6 (2020 20:38)  HR: 80 (2020 06:05) (80 - 94)  BP: 133/79 (2020 06:05) (96/61 - 150/74)  BP(mean): --  RR: 18 (2020 06:05) (14 - 20)  SpO2: 98% (2020 06:05) (98% - 100%) I&O's Summary    2020 07:01  -  2020 07:00  --------------------------------------------------------  IN: 600 mL / OUT: 300 mL / NET: 300 mL    GENERAL: Deferred due to COVID 19  [ ]Alert  [ ]Oriented x   [ ]Lethargic  [ ]Cachexia  [ ]Unarousable  [ ]Verbal  [ ]Non-Verbal  Behavioral: Deferred due to COVID 19  [ ] Anxiety  [ ] Delirium [ ] Agitation [ ] Other  HEENT: Deferred due to COVID 19  [ ]Normal   [ ]Dry mouth   [ ]ET Tube/Trach  [ ]Oral lesions  PULMONARY: Deferred due to COVID 19  [ ]Clear [ ]Tachypnea  [ ]Audible excessive secretions   [ ]Rhonchi        [ ]Right [ ]Left [ ]Bilateral  [ ]Crackles        [ ]Right [ ]Left [ ]Bilateral  [ ]Wheezing     [ ]Right [ ]Left [ ]Bilateral  CARDIOVASCULAR: Deferred due to COVID 19   [ ]Regular [ ]Irregular [ ]Tachy  [ ]Tanner [ ]Murmur [ ]Other  GASTROINTESTINAL: Deferred due to COVID 19  [ ]Soft  [ ]Distended   [ ]+BS  [ ]Non tender [ ]Tender  [ ]PEG [ ]OGT/ NGT  Last BM:   GENITOURINARY: Deferred due to COVID 19  [ ]Normal [ ] Incontinent   [ ]Oliguria/Anuria   [ ]Iverson  MUSCULOSKELETAL: Deferred due to COVID 19  [ ]Normal   [ ]Weakness  [ ]Bed/Wheelchair bound [ ]Edema  NEUROLOGIC: Deferred due to COVID 19  [ ]No focal deficits  [ ] Cognitive impairment  [ ] Dysphagia [ ]Dysarthria [ ] Paresis [ ]Other   SKIN: Deferred due to COVID 19  [ ]Normal   [ ]Pressure ulcer(s)  [ ]Rash    CRITICAL CARE:  [ ] Shock Present  [ ]Septic [ ]Cardiogenic [ ]Neurologic [ ]Hypovolemic  [ ]  Vasopressors [ ]  Inotropes   [ ] Respiratory failure present [ ] mechanical ventilation [ ] non-invasive ventilatory support [ ] High flow  [ ] Acute  [ ] Chronic [ ] Hypoxic  [ ] Hypercarbic [ ] Other  [x ] Other organ failure : renal    LABS:                        13.5   29.66 )-----------( 385      ( 2020 09:59 )             41.1       137  |  99  |  58<H>  ----------------------------<  249<H>  6.1<H>   |  20<L>  |  2.21<H>    Ca    12.8<H>      2020 12:06    TPro  6.9  /  Alb  3.1<L>  /  TBili  0.4  /  DBili  x   /  AST  23  /  ALT  14  /  AlkPhos  66        Urinalysis Basic - ( 2020 13:36 )    Color: Yellow / Appearance: Turbid / S.020 / pH: x  Gluc: x / Ketone: Negative  / Bili: Negative / Urobili: Negative   Blood: x / Protein: 30 mg/dL / Nitrite: Negative   Leuk Esterase: Large / RBC: 5 /hpf / WBC >50   Sq Epi: x / Non Sq Epi: 3 / Bacteria: Many      RADIOLOGY & ADDITIONAL STUDIES:    PROTEIN CALORIE MALNUTRITION PRESENT: [ ] Yes [ ] No  [ ] PPSV2 < or = to 30% [ ] significant weight loss  [ ] poor nutritional intake [ ] catabolic state [ ] anasarca     Albumin, Serum: 3.1 g/dL (20 @ 09:59)  Artificial Nutrition [ ]     REFERRALS:   [ ]Chaplaincy  [ ] Hospice  [ ]Child Life  [ ]Social Work  [ ]Case management [ ]Holistic Therapy     Goals of Care Document:   Care Coordination Assessment [C. Provider] (20 @ 10:54)   Progress Notes - Care Coordination [C. Provider] (20 @ 11:21) HPI:  89 yo F with Pmhx of breast cancer s/p lumpectomy and hormone therapy, DMII, HTN presents to the ED with complaints of weakness, loss of appetite, cough, SOB, and diarrhea. Patient has been having the symptoms for the last 4 weeks. She has been having decreased PO intake. She also has been becoming progressively weak and as a result, patient was sent to the ED. She has no known COVID exposure. She was recently diagnosed with UTI and was treated with abx for 7 days. His oncologist obtained labs as outpatient and found that she had elevated WBC.   In the vitals were: Tmax 98.7 F, BP 90s, -150s/80-90s mmHg, RR 20, SO2 99 on NC 2L. Her labs were significant for leukocytosis, lymphopenia, bandemia, thrombocytosis, elevated AG, hyperglycemia, lactic acidosis, hypercalcemia, hypercalcemia. UA showed large LE, nitrate, and bacteria. (2020 16:38)    PERTINENT PM/SXH:   Kidney disease  H/O gastroesophageal reflux (GERD)  Malignant hypertension w/o heart disease, w/o chronic kidney disease  Hyperlipidemia  DM (diabetes mellitus)  Hypertension    H/O lumpectomy  S/P cholecystectomy  S/P hysterectomy    FAMILY HISTORY:  FH: breast cancer: granddaughter  FH: diabetes mellitus: sister  Family history of rectal cancer: brother    ITEMS NOT CHECKED ARE NOT PRESENT    SOCIAL HISTORY:   Significant other/partner:  [x ]  Children:  [ ]  Latter-day/Spirituality:  Substance hx:  [ ]   Tobacco hx:  [ ]   Alcohol hx: [ ]   Home Opioid hx:  [ ] I-Stop Reference No:  Living Situation: [x ]Home-lives with her niece  [ ]Long term care  [ ]Rehab [ ]Other    ADVANCE DIRECTIVES:    DNR  Yes  MOLST  [x ]  Living Will  [ ]   DECISION MAKER(s):  [ ] Health Care Proxy(s) [ ] Surrogate(s): Resident caring for patient confirming patient is ok with granddaughterSelene being the spokesperson and decision maker  [ ] Guardian           Name(s): Phone Number(s): marquita Hernandez [phone]    BASELINE (I)ADL(s) (prior to admission):  [place]: [ ]Total  [x ] Moderate [ ]Dependent    Allergies    No Known Allergies    Intolerances    MEDICATIONS  (STANDING):  aspirin  chewable 81 milliGRAM(s) Oral daily  azithromycin   Tablet 500 milliGRAM(s) Oral daily  cefTRIAXone   IVPB 1000 milliGRAM(s) IV Intermittent every 24 hours  dextrose 5%. 1000 milliLiter(s) (50 mL/Hr) IV Continuous <Continuous>  dextrose 50% Injectable 12.5 Gram(s) IV Push once  dextrose 50% Injectable 25 Gram(s) IV Push once  dextrose 50% Injectable 25 Gram(s) IV Push once  dextrose 50% Injectable 50 milliLiter(s) IV Push once  donepezil 10 milliGRAM(s) Oral at bedtime  heparin   Injectable 5000 Unit(s) SubCutaneous every 12 hours  insulin glargine Injectable (LANTUS) 32 Unit(s) SubCutaneous at bedtime  insulin lispro (HumaLOG) corrective regimen sliding scale   SubCutaneous at bedtime  insulin lispro (HumaLOG) corrective regimen sliding scale   SubCutaneous three times a day before meals  insulin lispro Injectable (HumaLOG) 4 Unit(s) SubCutaneous three times a day before meals  insulin regular  human recombinant 5 Unit(s) IV Push once  simvastatin 20 milliGRAM(s) Oral at bedtime    MEDICATIONS  (PRN):  dextrose 40% Gel 15 Gram(s) Oral once PRN Blood Glucose LESS THAN 70 milliGRAM(s)/deciliter  glucagon  Injectable 1 milliGRAM(s) IntraMuscular once PRN Glucose LESS THAN 70 milligrams/deciliter    Due to COVID 19 and in effort to decrease provider exposure and use of PPE, H&P, ROS and/or PE was taken from primary team's note.    PRESENT SYMPTOMS: [ ]Unable to obtain due to poor mentation   Source if other than patient:  [ ]Family   [ ]Team     Pain: [ ] yes [ ] no  QOL impact -   Location -                    Aggravating factors -  Quality -  Radiation -  Timing-  Severity (0-10 scale):  Minimal acceptable level (0-10 scale):     PAIN AD Score:     [email or web address] (press ctrl +  left click to view)    Due to COVID 19, ROS per primary team    Dyspnea:                           [ ]Mild [ ]Moderate [ ]Severe  Anxiety:                             [ ]Mild [ ]Moderate [ ]Severe  Fatigue:                             [ ]Mild [ ]Moderate [ ]Severe  Nausea:                             [ ]Mild [ ]Moderate [ ]Severe  Loss of appetite:              [ ]Mild [ ]Moderate [ ]Severe  Constipation:                    [ ]Mild [ ]Moderate [ ]Severe    Other Symptoms:  [ ]All other review of systems negative     Karnofsky Performance Score/Palliative Performance Status Version 2:     10-20    %    http://npcrc.org/files/news/palliative_performance_scale_ppsv2.pdf  PHYSICAL EXAM:  Vital Signs Last 24 Hrs  T(C): 36.7 (2020 06:05), Max: 37 (2020 20:38)  T(F): 98.1 (2020 06:05), Max: 98.6 (2020 20:38)  HR: 80 (2020 06:05) (80 - 94)  BP: 133/79 (2020 06:05) (96/61 - 150/74)  BP(mean): --  RR: 18 (2020 06:05) (14 - 20)  SpO2: 98% (2020 06:05) (98% - 100%) I&O's Summary    2020 07:01  -  2020 07:00  --------------------------------------------------------  IN: 600 mL / OUT: 300 mL / NET: 300 mL    GENERAL: Deferred due to COVID 19  [ ]Alert  [ ]Oriented x   [ ]Lethargic  [ ]Cachexia  [ ]Unarousable  [ ]Verbal  [ ]Non-Verbal  Behavioral: Deferred due to COVID 19  [ ] Anxiety  [ ] Delirium [ ] Agitation [ ] Other  HEENT: Deferred due to COVID 19  [ ]Normal   [ ]Dry mouth   [ ]ET Tube/Trach  [ ]Oral lesions  PULMONARY: Deferred due to COVID 19  [ ]Clear [ ]Tachypnea  [ ]Audible excessive secretions   [ ]Rhonchi        [ ]Right [ ]Left [ ]Bilateral  [ ]Crackles        [ ]Right [ ]Left [ ]Bilateral  [ ]Wheezing     [ ]Right [ ]Left [ ]Bilateral  CARDIOVASCULAR: Deferred due to COVID 19   [ ]Regular [ ]Irregular [ ]Tachy  [ ]Tanner [ ]Murmur [ ]Other  GASTROINTESTINAL: Deferred due to COVID 19  [ ]Soft  [ ]Distended   [ ]+BS  [ ]Non tender [ ]Tender  [ ]PEG [ ]OGT/ NGT  Last BM:   GENITOURINARY: Deferred due to COVID 19  [ ]Normal [ ] Incontinent   [ ]Oliguria/Anuria   [ ]Iverson  MUSCULOSKELETAL: Deferred due to COVID 19  [ ]Normal   [ ]Weakness  [ ]Bed/Wheelchair bound [ ]Edema  NEUROLOGIC: Deferred due to COVID 19  [ ]No focal deficits  [ ] Cognitive impairment  [ ] Dysphagia [ ]Dysarthria [ ] Paresis [ ]Other   SKIN: Deferred due to COVID 19  [ ]Normal   [ ]Pressure ulcer(s)  [ ]Rash    CRITICAL CARE:  [ ] Shock Present  [ ]Septic [ ]Cardiogenic [ ]Neurologic [ ]Hypovolemic  [ ]  Vasopressors [ ]  Inotropes   [ ] Respiratory failure present [ ] mechanical ventilation [ ] non-invasive ventilatory support [ ] High flow  [ ] Acute  [ ] Chronic [ ] Hypoxic  [ ] Hypercarbic [ ] Other  [x ] Other organ failure : renal    LABS:                        13.5   29.66 )-----------( 385      ( 2020 09:59 )             41.1       137  |  99  |  58<H>  ----------------------------<  249<H>  6.1<H>   |  20<L>  |  2.21<H>    Ca    12.8<H>      2020 12:06    TPro  6.9  /  Alb  3.1<L>  /  TBili  0.4  /  DBili  x   /  AST  23  /  ALT  14  /  AlkPhos  66        Urinalysis Basic - ( 2020 13:36 )    Color: Yellow / Appearance: Turbid / S.020 / pH: x  Gluc: x / Ketone: Negative  / Bili: Negative / Urobili: Negative   Blood: x / Protein: 30 mg/dL / Nitrite: Negative   Leuk Esterase: Large / RBC: 5 /hpf / WBC >50   Sq Epi: x / Non Sq Epi: 3 / Bacteria: Many      RADIOLOGY & ADDITIONAL STUDIES:  < from: Xray Chest 1 View- PORTABLE-Urgent (20 @ 13:01) >    EXAM:  XR CHEST PORTABLE URGENT 1V                            PROCEDURE DATE:  2020            INTERPRETATION:  INDICATION:SOB hypoxia    COMPARISON: None available    FINDINGS:    Heart: The heart size is normal.    Mediastinum: Mediastinal contours are normal. There are no enlarged mediastinal or hilar nodes.    Lungs: There is opacification of 80% left hemithorax likely due to a large effusion. Small aerated portion of left upper lung shows interstitial infiltrate. Diffuse patchy infiltrate is seen in the right upper and lower lung fields.    Pulmonary vascularity: Pulmonary vascularity is normal.    Osseous structures: The osseous structures are intact.    Soft tissues:There are no soft tissue abnormalities    Pleura: There are nopleural effusions.    IMPRESSION:    Large left pleural effusion. Left apical infiltrate.    Diffuse infiltrate in the right upper and lower lungs                    LITTLE OTTO M.D.; ATTENDING RADIOLOGIST  This document has been electronically signed. 2020  1:27PM                < end of copied text >      PROTEIN CALORIE MALNUTRITION PRESENT: [x ] Yes [ ] No  [x ] PPSV2 < or = to 30% [ ] significant weight loss  [x ] poor nutritional intake [ ] catabolic state [ ] anasarca     Albumin, Serum: 3.1 g/dL (20 @ 09:59)  Artificial Nutrition [ ]     REFERRALS:   [ ]Chaplaincy  [ ] Hospice  [ ]Child Life  [ ]Social Work  [x ]Case management [ ]Holistic Therapy     Goals of Care Document:   Care Coordination Assessment [C. Provider] (20 @ 10:54)   Progress Notes - Care Coordination [C. Provider] (20 @ 11:21)

## 2020-04-24 ENCOUNTER — APPOINTMENT (OUTPATIENT)
Dept: HEMATOLOGY ONCOLOGY | Facility: CLINIC | Age: 85
End: 2020-04-24

## 2020-04-24 DIAGNOSIS — E87.8 OTHER DISORDERS OF ELECTROLYTE AND FLUID BALANCE, NOT ELSEWHERE CLASSIFIED: ICD-10-CM

## 2020-04-24 DIAGNOSIS — E87.5 HYPERKALEMIA: ICD-10-CM

## 2020-04-24 DIAGNOSIS — Z71.89 OTHER SPECIFIED COUNSELING: ICD-10-CM

## 2020-04-24 LAB
-  AMIKACIN: SIGNIFICANT CHANGE UP
-  AMPICILLIN/SULBACTAM: SIGNIFICANT CHANGE UP
-  AMPICILLIN: SIGNIFICANT CHANGE UP
-  AZTREONAM: SIGNIFICANT CHANGE UP
-  CEFAZOLIN: SIGNIFICANT CHANGE UP
-  CEFEPIME: SIGNIFICANT CHANGE UP
-  CEFOXITIN: SIGNIFICANT CHANGE UP
-  CEFTRIAXONE: SIGNIFICANT CHANGE UP
-  CIPROFLOXACIN: SIGNIFICANT CHANGE UP
-  GENTAMICIN: SIGNIFICANT CHANGE UP
-  IMIPENEM: SIGNIFICANT CHANGE UP
-  LEVOFLOXACIN: SIGNIFICANT CHANGE UP
-  MEROPENEM: SIGNIFICANT CHANGE UP
-  NITROFURANTOIN: SIGNIFICANT CHANGE UP
-  PIPERACILLIN/TAZOBACTAM: SIGNIFICANT CHANGE UP
-  TIGECYCLINE: SIGNIFICANT CHANGE UP
-  TOBRAMYCIN: SIGNIFICANT CHANGE UP
-  TRIMETHOPRIM/SULFAMETHOXAZOLE: SIGNIFICANT CHANGE UP
ANION GAP SERPL CALC-SCNC: 16 MMOL/L — SIGNIFICANT CHANGE UP (ref 5–17)
BUN SERPL-MCNC: 58 MG/DL — HIGH (ref 7–23)
CALCIUM SERPL-MCNC: 12.7 MG/DL — HIGH (ref 8.4–10.5)
CHLORIDE SERPL-SCNC: 99 MMOL/L — SIGNIFICANT CHANGE UP (ref 96–108)
CO2 SERPL-SCNC: 19 MMOL/L — LOW (ref 22–31)
CREAT SERPL-MCNC: 2.19 MG/DL — HIGH (ref 0.5–1.3)
CULTURE RESULTS: SIGNIFICANT CHANGE UP
GLUCOSE BLDC GLUCOMTR-MCNC: 132 MG/DL — HIGH (ref 70–99)
GLUCOSE BLDC GLUCOMTR-MCNC: 166 MG/DL — HIGH (ref 70–99)
GLUCOSE BLDC GLUCOMTR-MCNC: 178 MG/DL — HIGH (ref 70–99)
GLUCOSE BLDC GLUCOMTR-MCNC: 185 MG/DL — HIGH (ref 70–99)
GLUCOSE SERPL-MCNC: 152 MG/DL — HIGH (ref 70–99)
HCT VFR BLD CALC: 47 % — HIGH (ref 34.5–45)
HGB BLD-MCNC: 14.7 G/DL — SIGNIFICANT CHANGE UP (ref 11.5–15.5)
INR BLD: 1.04 RATIO — SIGNIFICANT CHANGE UP (ref 0.88–1.16)
LACTATE SERPL-SCNC: 3.3 MMOL/L — HIGH (ref 0.7–2)
LACTATE SERPL-SCNC: 3.8 MMOL/L — HIGH (ref 0.7–2)
MAGNESIUM SERPL-MCNC: 2.7 MG/DL — HIGH (ref 1.6–2.6)
MCHC RBC-ENTMCNC: 30.8 PG — SIGNIFICANT CHANGE UP (ref 27–34)
MCHC RBC-ENTMCNC: 31.3 GM/DL — LOW (ref 32–36)
MCV RBC AUTO: 98.3 FL — SIGNIFICANT CHANGE UP (ref 80–100)
METHOD TYPE: SIGNIFICANT CHANGE UP
NRBC # BLD: 0 /100 WBCS — SIGNIFICANT CHANGE UP (ref 0–0)
ORGANISM # SPEC MICROSCOPIC CNT: SIGNIFICANT CHANGE UP
ORGANISM # SPEC MICROSCOPIC CNT: SIGNIFICANT CHANGE UP
PHOSPHATE SERPL-MCNC: 3.8 MG/DL — SIGNIFICANT CHANGE UP (ref 2.5–4.5)
PLATELET # BLD AUTO: 350 K/UL — SIGNIFICANT CHANGE UP (ref 150–400)
POTASSIUM SERPL-MCNC: 6.1 MMOL/L — HIGH (ref 3.5–5.3)
POTASSIUM SERPL-SCNC: 6.1 MMOL/L — HIGH (ref 3.5–5.3)
PROTHROM AB SERPL-ACNC: 12 SEC — SIGNIFICANT CHANGE UP (ref 10–12.9)
RBC # BLD: 4.78 M/UL — SIGNIFICANT CHANGE UP (ref 3.8–5.2)
RBC # FLD: 17.3 % — HIGH (ref 10.3–14.5)
SODIUM SERPL-SCNC: 134 MMOL/L — LOW (ref 135–145)
SPECIMEN SOURCE: SIGNIFICANT CHANGE UP
WBC # BLD: 34.56 K/UL — HIGH (ref 3.8–10.5)
WBC # FLD AUTO: 34.56 K/UL — HIGH (ref 3.8–10.5)

## 2020-04-24 PROCEDURE — 99233 SBSQ HOSP IP/OBS HIGH 50: CPT | Mod: GC

## 2020-04-24 PROCEDURE — 99232 SBSQ HOSP IP/OBS MODERATE 35: CPT | Mod: GC

## 2020-04-24 RX ORDER — SODIUM ZIRCONIUM CYCLOSILICATE 10 G/10G
10 POWDER, FOR SUSPENSION ORAL DAILY
Refills: 0 | Status: DISCONTINUED | OUTPATIENT
Start: 2020-04-24 | End: 2020-04-26

## 2020-04-24 RX ORDER — SODIUM ZIRCONIUM CYCLOSILICATE 10 G/10G
10 POWDER, FOR SUSPENSION ORAL ONCE
Refills: 0 | Status: COMPLETED | OUTPATIENT
Start: 2020-04-24 | End: 2020-04-24

## 2020-04-24 RX ORDER — HEPARIN SODIUM 5000 [USP'U]/ML
5000 INJECTION INTRAVENOUS; SUBCUTANEOUS EVERY 12 HOURS
Refills: 0 | Status: DISCONTINUED | OUTPATIENT
Start: 2020-04-24 | End: 2020-04-26

## 2020-04-24 RX ORDER — ONDANSETRON 8 MG/1
4 TABLET, FILM COATED ORAL EVERY 6 HOURS
Refills: 0 | Status: DISCONTINUED | OUTPATIENT
Start: 2020-04-24 | End: 2020-04-26

## 2020-04-24 RX ADMIN — Medication 81 MILLIGRAM(S): at 12:12

## 2020-04-24 RX ADMIN — SODIUM ZIRCONIUM CYCLOSILICATE 10 GRAM(S): 10 POWDER, FOR SUSPENSION ORAL at 00:14

## 2020-04-24 RX ADMIN — DONEPEZIL HYDROCHLORIDE 10 MILLIGRAM(S): 10 TABLET, FILM COATED ORAL at 22:31

## 2020-04-24 RX ADMIN — INSULIN GLARGINE 30 UNIT(S): 100 INJECTION, SOLUTION SUBCUTANEOUS at 22:32

## 2020-04-24 RX ADMIN — ONDANSETRON 4 MILLIGRAM(S): 8 TABLET, FILM COATED ORAL at 22:41

## 2020-04-24 RX ADMIN — CEFEPIME 100 MILLIGRAM(S): 1 INJECTION, POWDER, FOR SOLUTION INTRAMUSCULAR; INTRAVENOUS at 17:28

## 2020-04-24 RX ADMIN — SIMVASTATIN 20 MILLIGRAM(S): 20 TABLET, FILM COATED ORAL at 22:32

## 2020-04-24 RX ADMIN — CALCITONIN SALMON 280 INTERNATIONAL UNIT(S): 200 INJECTION, SOLUTION INTRAMUSCULAR at 17:28

## 2020-04-24 RX ADMIN — CALCITONIN SALMON 280 INTERNATIONAL UNIT(S): 200 INJECTION, SOLUTION INTRAMUSCULAR at 06:23

## 2020-04-24 RX ADMIN — ONDANSETRON 4 MILLIGRAM(S): 8 TABLET, FILM COATED ORAL at 09:18

## 2020-04-24 RX ADMIN — SODIUM ZIRCONIUM CYCLOSILICATE 10 GRAM(S): 10 POWDER, FOR SUSPENSION ORAL at 17:28

## 2020-04-24 RX ADMIN — Medication 2: at 17:34

## 2020-04-24 RX ADMIN — CEFEPIME 100 MILLIGRAM(S): 1 INJECTION, POWDER, FOR SOLUTION INTRAMUSCULAR; INTRAVENOUS at 06:23

## 2020-04-24 RX ADMIN — HEPARIN SODIUM 5000 UNIT(S): 5000 INJECTION INTRAVENOUS; SUBCUTANEOUS at 22:31

## 2020-04-24 RX ADMIN — Medication 2: at 13:15

## 2020-04-24 NOTE — PROGRESS NOTE ADULT - ATTENDING COMMENTS
Discussed with HEBER Renee. Plan for home with hospice. [facility] following. Palliative remains available if acute intractable symptoms or issues  [phone]

## 2020-04-24 NOTE — PROGRESS NOTE ADULT - PROBLEM SELECTOR PLAN 3
Rising potassium. As per Isabella, resident they are awaiting recommendations from nephrology. Spoke with granddaughter and the family is all in agreement for home with hospice. No plans for HD.

## 2020-04-24 NOTE — PROGRESS NOTE ADULT - PROBLEM SELECTOR PLAN 5
Spoke with granddaughter, Selene about patient's condition. Patient likely nearing end of life 2/2 to advanced cancer and now with renal failure and electrolyte imbalance. Patient's wishes were to go home and family agreeable to home with hospice services. No plans for further aggressive medical management. Spoke with Selene about likelihood of limited lifespan in patient's current condition and she would like to arrange for hospice as soon as possible. Resident Isabella made aware. Spoke with Lashon Gabriel RN from [facility] who will reach out to Sakinajocelyn (requested by Selene to call her to arrange for hospice) at [phone] and they will attempt to expedite patient going home with hospice services.

## 2020-04-24 NOTE — PROGRESS NOTE ADULT - ATTENDING COMMENTS
Agree with assessment and plan as above by Dr. Sadler. Reviewed all pertinent labs, glucose values, and imaging studies. Modifications made as indicated above. Monitor calcium now getting calcitonin and s/p pamidronate. Glucose better controlled. Not eating much. Avoid standing Humalog if not eating much. Goal glucose 100-200 no need for tight glycemic control.    Yony Barrett D.O  [phone]

## 2020-04-24 NOTE — PROGRESS NOTE ADULT - ASSESSMENT
89 yo F with Pmhx of breast cancer s/p lumpectomy and hormone therapy, DMII, HTN presents to the ED with complaints of weakness, loss of appetite, cough, SOB, and diarrhea. Admitted for possible UTI sepsis, hypercalcemia and pleural effusion (high risk patient with severely uncontrolled diabetes with A1c 10% with severe hypercalcemia with multiple complications at this time with possible COVID high risk for CAD and CVA with high level decision-making). 89 yo F with Pmhx of breast cancer s/p lumpectomy and hormone therapy, DMII, HTN presents to the ED with complaints of weakness, loss of appetite, cough, SOB, and diarrhea. Admitted for possible UTI sepsis, hypercalcemia and pleural effusion

## 2020-04-24 NOTE — PROGRESS NOTE ADULT - PROBLEM SELECTOR PLAN 10
DVT-hep sub q given CKD   Diet-DASH/consistent carbs  Dispo: Home w/ home hospice, referral provided 4/24   Transitions of Care Status:  1.  Name of PCP: Dr. Sadler   2.  PCP Contacted on Admission: [x ] Y    [ ] N    3.  PCP contacted at Discharge: [ ] Y    [ ] N    [ ] N/A  4.  Post-Discharge Appointment Date and Location:  5.  Summary of Handoff given to PCP:

## 2020-04-24 NOTE — PROGRESS NOTE ADULT - PROBLEM SELECTOR PLAN 5
Presented w/ lactate 3.2, likely in setting of acute infection; patient w/ evidence of CKD, Bl Cr 2.2-2.5  - f/u repeat lactate Patient w/ evidence of CKD, Bl Cr 2.2-2.5. Presented w/ lactate 3.2, likely in setting of acute infection. Now persistently hyperkalemic w/ elevated Mg, uptrending lactate   - s/p lokelma 10 mg x 1 ON  - start daily lokelma 10mg for persistent hyperkalemia    - patient likely with impending renal failure; will forgo dialysis at this time as family would like to make patient hospice care   - f/u repeat lactate as initial sample clotted   - CTM BMP q12 hrs  - CTM UOP closely

## 2020-04-24 NOTE — CHART NOTE - NSCHARTNOTEFT_GEN_A_CORE
Hyperkalemia to 5.6.  Previously higher.  Will give Lokema 10 mg and recheck BMP as part of morning labs.    Karlo Alonso MD, PGY1  [facility]   pager: [phone]  Pager: [facility]: [phone] / MAYELA: [phone]

## 2020-04-24 NOTE — PROGRESS NOTE ADULT - PROBLEM SELECTOR PLAN 4
Likely 2/2 to metastatic breast cancer vs. from pneumonia or renal failure. Arrived to the ED with dyspnea which appears to be intermittent. Oxygen saturation stable on nasal cannula. Discussion about possible tap, however, granddaughter would like to bring patient home and it is unclear whether patient would be agreeable to procedure.

## 2020-04-24 NOTE — PROGRESS NOTE ADULT - SUBJECTIVE AND OBJECTIVE BOX
Pulmonary Follow up Note     large left pleural effusion    history of breast cancer with enlarged pulmonary nodules and pleural  thickening    On NC without dyspnea at rest    debilitated, frail    appreciate palliative care input    drainage of pleural effusion deferred  pending further discussion with family    pleur X catheter was been recommended as an option    Vital Signs Last 24 Hrs  T(C): 36.4 (24 Apr 2020 14:21), Max: 36.6 (24 Apr 2020 06:44)  T(F): 97.6 (24 Apr 2020 14:21), Max: 97.8 (24 Apr 2020 06:44)  HR: 94 (24 Apr 2020 14:21) (85 - 119)  BP: 129/65 (24 Apr 2020 14:21) (109/70 - 156/70)  BP(mean): --  RR: 18 (24 Apr 2020 14:21) (18 - 18)  SpO2: 95% (24 Apr 2020 14:21) (94% - 97%)    lungs diminsihed aeration on left unlabored  cor rr  abd nt    IMPRESSION  metastatic breast cancer with large left pleural effusion    frail debilitated status      RECOMMEND   nasal O2  palliative care follow up    Pleur X catheter would be best option for palliation as any thoracentesis or drainage would likely offer only temporary relief.  Family discussing hospice    Steve Perez MD  Pulmonary

## 2020-04-24 NOTE — PROGRESS NOTE ADULT - NSREFPHYEXREFTO_GEN_ALL_CORE
ANTICOAGULATION FOLLOW-UP CLINIC VISIT    Patient Name:  Sawyer Renteria  Date:  3/1/2019  Contact Type:  Face to Face    SUBJECTIVE:     Patient Findings     Positives:   Change in diet/appetite (ate more broccoli along with his usual spinach)           OBJECTIVE    INR Protime   Date Value Ref Range Status   2019 1.7 (A) 0.86 - 1.14 Final       ASSESSMENT / PLAN  INR assessment SUB    Recheck INR In: 2 WEEKS    INR Location Clinic      Anticoagulation Summary  As of 3/1/2019    INR goal:   2.0-3.0   TTR:   68.6 % (2.7 y)   INR used for dosin.7! (3/1/2019)   Warfarin maintenance plan:   2.5 mg (5 mg x 0.5) every Mon, Wed, Fri; 5 mg (5 mg x 1) all other days   Full warfarin instructions:   3/1: 5 mg; Otherwise 2.5 mg every Mon, Wed, Fri; 5 mg all other days   Weekly warfarin total:   27.5 mg   Plan last modified:   Donna Colby RN (2018)   Next INR check:   3/15/2019   Target end date:   Indefinite    Indications    Left ventricular thrombus [I51.3]  Transient cerebral ischemia [G45.9]  Long term current use of anticoagulants with INR goal of 2.0-3.0 [Z79.01]             Anticoagulation Episode Summary     INR check location:       Preferred lab:       Send INR reminders to:   [facility] HEART INR NURSE    Comments:         Anticoagulation Care Providers     Provider Role Specialty Phone number    Satya Newton MD Responsible Cardiology [phone]            See the Encounter Report to view Anticoagulation Flowsheet and Dosing Calendar (Go to Encounters tab in chart review, and find the Anticoagulation Therapy Visit)    INR 1.7 He has been eating more broccoli this week along with his usual spinach 2x/wk. He doesn't think he missed a dose but he doesn't use a pillbox. Encouraged him to use a pillbox so that if he does miss a dose of Coumadin he can make up the missed dose the next day. No change in meds. No abnormal bleeding, bruising or clotting symptoms. Will boost today's dose to 5 mg 
then resume usual dosing of 2.5 mg MWF and 5 mg all other days with recheck in 2 weeks. Advised to avoid greens/broccoli for 2 days then resume his usual amount of greens (not the extra broccoli). He administered the Praluent injection in his left thigh. Dosage adjustment made based on physician directed care plan.    Donna Colby RN                 
Inpatient Physical Exam
Inpatient Physical Exam

## 2020-04-24 NOTE — PROGRESS NOTE ADULT - PROBLEM SELECTOR PLAN 4
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 CT chest showing complete collapse of left lung with large pleural effusion   - c/w cefepime 1g q12 hours for possible bacterial superinfection   - IR consulted for chest tube placement; holding hep subq; stat PT/INR   - f/u thoracic fluid studies L sided pleural effusion likely metastatic in nature. CT chest showing complete collapse of left lung with large pleural effusion   - c/w cefepime 1g q12 hours for possible bacterial superinfection   - IR consulted for chest tube placement; holding hep subq, INR wnl   - f/u thoracic fluid studies

## 2020-04-24 NOTE — PROGRESS NOTE ADULT - SUBJECTIVE AND OBJECTIVE BOX
***************************************************************  Dr. Isabella Altamirano  Internal Medicine   [facility] Pager: [phone]  [facility] Pager: [phone]   ***************************************************************    AMY OCHOA  89y  MRN: [id number]    Subjective:    Patient is a 89y old  Female who presents with a chief complaint of Weakness, diarrhea, and cough (2020 15:57)      Interval history/overnight events:    SOFIYA ON. This AM, one episode of nb/nb emesis, likely in the setting of malignancy vs. hypercalcemia from malignancy. Zofran ordered PRN nausea (qtc 387 ). Hypertensive to 130s-170s yesterday, holding home lasix, metoprolol and hydralazine. Will continue to hold as AM bp soft.     Patient Covid neg x 3.Remains on NC 2L, SpO2 95-96%. Pending transfer to non-covid floor.       MEDICATIONS  (STANDING):  aspirin  chewable 81 milliGRAM(s) Oral daily  calcitonin Injectable 280 International Unit(s) IntraMuscular every 12 hours  cefepime   IVPB 1000 milliGRAM(s) IV Intermittent every 12 hours  cefepime   IVPB      dextrose 5%. 1000 milliLiter(s) (50 mL/Hr) IV Continuous <Continuous>  dextrose 50% Injectable 12.5 Gram(s) IV Push once  dextrose 50% Injectable 25 Gram(s) IV Push once  dextrose 50% Injectable 25 Gram(s) IV Push once  donepezil 10 milliGRAM(s) Oral at bedtime  heparin   Injectable 5000 Unit(s) SubCutaneous every 12 hours  insulin glargine Injectable (LANTUS) 30 Unit(s) SubCutaneous at bedtime  insulin lispro (HumaLOG) corrective regimen sliding scale   SubCutaneous three times a day before meals  insulin lispro Injectable (HumaLOG) 4 Unit(s) SubCutaneous three times a day before meals  simvastatin 20 milliGRAM(s) Oral at bedtime    MEDICATIONS  (PRN):  dextrose 40% Gel 15 Gram(s) Oral once PRN Blood Glucose LESS THAN 70 milliGRAM(s)/deciliter  glucagon  Injectable 1 milliGRAM(s) IntraMuscular once PRN Glucose LESS THAN 70 milligrams/deciliter        Objective:    Vitals: Vital Signs Last 24 Hrs  T(C): 36.6 (20 @ 06:44), Max: 36.6 (20 @ 06:44)  T(F): 97.8 (20 @ 06:44), Max: 97.8 (20 @ 06:44)  HR: 104 (20 @ 06:44) (79 - 104)  BP: 109/70 (20 @ 06:44) (109/70 - 178/78)  BP(mean): --  RR: 18 (20 @ :44) (18 - 18)  SpO2: 95% (20 @ 06:44) (95% - 96%)            I&O's Summary    2020 07:01  -  2020 07:00  --------------------------------------------------------  IN: 170 mL / OUT: 550 mL / NET: -380 mL        PHYSICAL EXAM:  GENERAL: NAD  HEENT: PERRL, no scleral icterus, no head and neck lad   CHEST/LUNG: CTAB, no wheezing, crackles, or ronchi   HEART: RRR, normal S1, S2, no murmurs, gallops, or rubs appreciated   ABDOMEN: soft, nondistended, non-tender, normoactive, no HSM, no rebound, no guarding, no rigidity  SKIN: No rashes or lesions  NERVOUS SYSTEM: Alert & Oriented X3  PSYCH: calm and cooperative     LABS:        135  |  100  |  59<H>  ----------------------------<  145<H>  5.6<H>   |  23  |  2.17<H>  04-23    137  |  99  |  58<H>  ----------------------------<  249<H>  6.1<H>   |  20<L>  |  2.21<H>  04-23    133<L>  |  99  |  56<H>  ----------------------------<  253<H>  6.2<HH>   |  20<L>  |  2.25<H>    Ca    12.5<H>      2020 22:57  Ca    12.8<H>      2020 12:06  Ca    12.8<H>      2020 09:59    TPro  6.9  /  Alb  3.1<L>  /  TBili  0.4  /  DBili  x   /  AST  23  /  ALT  14  /  AlkPhos  66  0423  TPro  7.2  /  Alb  3.0<L>  /  TBili  0.5  /  DBili  x   /  AST  29  /  ALT  14  /  AlkPhos  73  0422                  Urinalysis Basic - ( 2020 13:36 )    Color: Yellow / Appearance: Turbid / S.020 / pH: x  Gluc: x / Ketone: Negative  / Bili: Negative / Urobili: Negative   Blood: x / Protein: 30 mg/dL / Nitrite: Negative   Leuk Esterase: Large / RBC: 5 /hpf / WBC >50   Sq Epi: x / Non Sq Epi: 3 / Bacteria: Many                              14.7   34.56 )-----------( 350      ( 2020 08:21 )             47.0                         13.5   29.66 )-----------( 385      ( 2020 09:59 )             41.1                         14.1   24.16 )-----------( 409      ( 2020 13:02 )             42.9     CAPILLARY BLOOD GLUCOSE      POCT Blood Glucose.: 132 mg/dL (2020 08:07)  POCT Blood Glucose.: 129 mg/dL (2020 22:55)  POCT Blood Glucose.: 235 mg/dL (2020 17:24)  POCT Blood Glucose.: 242 mg/dL (2020 14:11)  POCT Blood Glucose.: 247 mg/dL (2020 12:06)          RADIOLOGY & ADDITIONAL TESTS:    CT Abdomen and Pelvis No Cont:   EXAM:  CT CHEST                          EXAM:  CT ABDOMEN AND PELVIS                            PROCEDURE DATE:  2020            INTERPRETATION:  CLINICAL INFORMATION: Large left pleural effusion. Clinical concern for empyema or metastatic breast carcinoma. Leukocytosis.    COMPARISON: CT scan of the chest, abdomen and pelvis dated 2019.    PROCEDURE:   CT of the Chest, Abdomen and Pelvis was performed without intravenous contrast.   Intravenous contrast: None.  Oral contrast: None.  Sagittal and coronal reformats were performed.    FINDINGS:    CHEST:     LUNGS AND LARGE AIRWAYS: Patent central airways. There is however severe new collapse of the left lung  New and enlarging pulmonary nodules of aerated right lung compared withprior study. For reference:   A right lower lobe nodule (2:61) currently measures 2.1 cm where previously measured 0.8 cm.  A right upper lobe posterior segment nodule (2:29) currently measures 1.3 cm which previously measured 0.4 cm.    PLEURA: Large new left pleural effusion with new pleural nodularity identified most compatible with metastatic disease involving the left pleural space. For reference there is a left posterior pleural nodule measuring 2.2 x 1.3 cm (2:29).  VESSELS: Within normal limits.  HEART: Heart size is normal. No pericardial effusion.  MEDIASTINUM AND DOMENICA: Left supraclavicular node new measuring 1.8 cm (2:1) which is incompletely imaged.  CHEST WALL AND LOWER NECK: Mild to moderate left axillary adenopathy previously described including 2 nodes measuring 2.5 x 1.8 cm and 2.5 x 1.6 cm now are incorporated into a confluent left axillary mass measuring approximately 9.6 x 5.7 cm (2:29). Increasing addition nodes are present. Skin and subcutaneous thickening of the left breast and left chest wall likely reflecting contiguous neoplastic involvement.    ABDOMEN AND PELVIS:    LIVER: New 1.3 cm nodule at the hepatic dome highly suspicious for a developing metastatic focus (2:61).  BILE DUCTS: Stable dilatation of the common bile duct likely due to patient's postcholecystectomy state.  GALLBLADDER: Status post cholecystectomy.  SPLEEN: Within normal limits.  PANCREAS: Within normal limits.  ADRENALS: Within normal limits.  KIDNEYS/URETERS: Indeterminant 1 cm noduleof the right kidney midpole unchanged in size compared with prior study.  Small right lower pole cyst. No evidence of renal calculus or hydronephrosis.  BLADDER: Within normal limits.  REPRODUCTIVE ORGANS: Hysterectomy.    BOWEL: No bowel obstruction. Appendix is normal.  PERITONEUM: No ascites.  VESSELS: Moderate atheromatous of the aorta and branch vessels..  RETROPERITONEUM/LYMPH NODES: No lymphadenopathy.    ABDOMINAL WALL: Within normal limits.  BONES: Moderate degenerative changes of spine. New moderate loss of height of T12 vertebral body when compared to the patient's previous examination.    IMPRESSION:     Increase in size and number of right lung metastases.  Collapse of the left lung with a large pleural effusion. 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  Developing hepatic metastatic focus is suspected.  Increasing metastatic oleg mass of the left axillary region and left supraclavicular region. Skin and subcutaneous thickening of the left chest wall and left breast  New loss of height of T12 vertebral body compared with prior scan.                              NILSA GARZA M.D., ATTENDING RADIOLOGIST  This document has been electronically signed. 2020  8:06PM             (20 @ 19:35)          Imaging Personally Reviewed:  [ ] YES  [ ] NO    Consultants involved in case:   Consultant(s) Notes Reviewed:  [ ] YES  [ ] NO:   Care Discussed with Consultants/Other Providers [ ] YES  [ ] NO ***************************************************************  Dr. Isabella Altamirano  Internal Medicine   [facility] Pager: [phone]  [facility] Pager: [phone]   ***************************************************************    AMY OCHOA  89y  MRN: [id number]    Subjective:    Patient is a 89y old  Female who presents with a chief complaint of Weakness, diarrhea, and cough (2020 15:57)      Interval history/overnight events:    Hypertensive to 130s-170s yesterday, holding home lasix, metoprolol and hydralazine. Will continue to hold as AM bp soft.     SOFIYA ON. This AM, one episode of nb/nb emesis, likely in the setting of malignancy vs. hypercalcemia from malignancy. Zofran ordered PRN nausea (qtc 387 ). Endorses nausea, extreme fatigue, SOB and slight cough. Denies abd pain. Passing gas. Patient Covid neg x 3. Remains on NC 2L, SpO2 95-96%. Pending transfer to non-covid floor.     IR consulted for drainage of left pleural effusion. Patient is A&O x 4 and okay to have the procedure to help her breath better.         MEDICATIONS  (STANDING):  aspirin  chewable 81 milliGRAM(s) Oral daily  calcitonin Injectable 280 International Unit(s) IntraMuscular every 12 hours  cefepime   IVPB 1000 milliGRAM(s) IV Intermittent every 12 hours  cefepime   IVPB      dextrose 5%. 1000 milliLiter(s) (50 mL/Hr) IV Continuous <Continuous>  dextrose 50% Injectable 12.5 Gram(s) IV Push once  dextrose 50% Injectable 25 Gram(s) IV Push once  dextrose 50% Injectable 25 Gram(s) IV Push once  donepezil 10 milliGRAM(s) Oral at bedtime  heparin   Injectable 5000 Unit(s) SubCutaneous every 12 hours  insulin glargine Injectable (LANTUS) 30 Unit(s) SubCutaneous at bedtime  insulin lispro (HumaLOG) corrective regimen sliding scale   SubCutaneous three times a day before meals  insulin lispro Injectable (HumaLOG) 4 Unit(s) SubCutaneous three times a day before meals  simvastatin 20 milliGRAM(s) Oral at bedtime    MEDICATIONS  (PRN):  dextrose 40% Gel 15 Gram(s) Oral once PRN Blood Glucose LESS THAN 70 milliGRAM(s)/deciliter  glucagon  Injectable 1 milliGRAM(s) IntraMuscular once PRN Glucose LESS THAN 70 milligrams/deciliter        Objective:    Vitals: Vital Signs Last 24 Hrs  T(C): 36.6 (20 @ 06:44), Max: 36.6 (20 @ 06:44)  T(F): 97.8 (20 @ 06:44), Max: 97.8 (20 @ 06:44)  HR: 104 (20 @ 06:44) (79 - 104)  BP: 109/70 (20 @ 06:44) (109/70 - 178/78)  BP(mean): --  RR: 18 (20 @ 06:44) (18 - 18)  SpO2: 95% (20 @ 06:44) (95% - 96%)            I&O's Summary    2020 07:01  -  2020 07:00  --------------------------------------------------------  IN: 170 mL / OUT: 550 mL / NET: -380 mL        PHYSICAL EXAM:  GENERAL: elderly female, appearing ill, respiring on 2L NC   HEENT: PERRL, no scleral icterus, no head and neck lad   CHEST/LUNG: CTAB, no wheezing, crackles, or ronchi   HEART: RRR, normal S1, S2, no murmurs, gallops, or rubs appreciated   ABDOMEN: soft, nondistended, non-tender, normoactive, no HSM, no rebound, no guarding, no rigidity  EXTREMITY: 1 plus edema bl LE   SKIN: No rashes or lesions  NERVOUS SYSTEM: Alert & Oriented X3  PSYCH: calm and cooperative     LABS:        135  |  100  |  59<H>  ----------------------------<  145<H>  5.6<H>   |  23  |  2.17<H>      137  |  99  |  58<H>  ----------------------------<  249<H>  6.1<H>   |  20<L>  |  2.21<H>      133<L>  |  99  |  56<H>  ----------------------------<  253<H>  6.2<HH>   |  20<L>  |  2.25<H>    Ca    12.5<H>      2020 22:57  Ca    12.8<H>      2020 12:06  Ca    12.8<H>      2020 09:59    TPro  6.9  /  Alb  3.1<L>  /  TBili  0.4  /  DBili  x   /  AST  23  /  ALT  14  /  AlkPhos  66  23  TPro  7.2  /  Alb  3.0<L>  /  TBili  0.5  /  DBili  x   /  AST  29  /  ALT  14  /  AlkPhos  73  0422                  Urinalysis Basic - ( 2020 13:36 )    Color: Yellow / Appearance: Turbid / S.020 / pH: x  Gluc: x / Ketone: Negative  / Bili: Negative / Urobili: Negative   Blood: x / Protein: 30 mg/dL / Nitrite: Negative   Leuk Esterase: Large / RBC: 5 /hpf / WBC >50   Sq Epi: x / Non Sq Epi: 3 / Bacteria: Many                              14.7   34.56 )-----------( 350      ( 2020 08:21 )             47.0                         13.5   29.66 )-----------( 385      ( 2020 09:59 )             41.1                         14.1   24.16 )-----------( 409      ( 2020 13:02 )             42.9     CAPILLARY BLOOD GLUCOSE      POCT Blood Glucose.: 132 mg/dL (2020 08:07)  POCT Blood Glucose.: 129 mg/dL (2020 22:55)  POCT Blood Glucose.: 235 mg/dL (2020 17:24)  POCT Blood Glucose.: 242 mg/dL (2020 14:11)  POCT Blood Glucose.: 247 mg/dL (2020 12:06)          RADIOLOGY & ADDITIONAL TESTS:    CT Abdomen and Pelvis No Cont:   EXAM:  CT CHEST                          EXAM:  CT ABDOMEN AND PELVIS                            PROCEDURE DATE:  2020            INTERPRETATION:  CLINICAL INFORMATION: Large left pleural effusion. Clinical concern for empyema or metastatic breast carcinoma. Leukocytosis.    COMPARISON: CT scan of the chest, abdomen and pelvis dated 2019.    PROCEDURE:   CT of the Chest, Abdomen and Pelvis was performed without intravenous contrast.   Intravenous contrast: None.  Oral contrast: None.  Sagittal and coronal reformats were performed.    FINDINGS:    CHEST:     LUNGS AND LARGE AIRWAYS: Patent central airways. There is however severe new collapse of the left lung  New and enlarging pulmonary nodules of aerated right lung compared withprior study. For reference:   A right lower lobe nodule (2:61) currently measures 2.1 cm where previously measured 0.8 cm.  A right upper lobe posterior segment nodule (2:29) currently measures 1.3 cm which previously measured 0.4 cm.    PLEURA: Large new left pleural effusion with new pleural nodularity identified most compatible with metastatic disease involving the left pleural space. For reference there is a left posterior pleural nodule measuring 2.2 x 1.3 cm (2:29).  VESSELS: Within normal limits.  HEART: Heart size is normal. No pericardial effusion.  MEDIASTINUM AND DOMENICA: Left supraclavicular node new measuring 1.8 cm (2:1) which is incompletely imaged.  CHEST WALL AND LOWER NECK: Mild to moderate left axillary adenopathy previously described including 2 nodes measuring 2.5 x 1.8 cm and 2.5 x 1.6 cm now are incorporated into a confluent left axillary mass measuring approximately 9.6 x 5.7 cm (2:29). Increasing addition nodes are present. Skin and subcutaneous thickening of the left breast and left chest wall likely reflecting contiguous neoplastic involvement.    ABDOMEN AND PELVIS:    LIVER: New 1.3 cm nodule at the hepatic dome highly suspicious for a developing metastatic focus (2:61).  BILE DUCTS: Stable dilatation of the common bile duct likely due to patient's postcholecystectomy state.  GALLBLADDER: Status post cholecystectomy.  SPLEEN: Within normal limits.  PANCREAS: Within normal limits.  ADRENALS: Within normal limits.  KIDNEYS/URETERS: Indeterminant 1 cm noduleof the right kidney midpole unchanged in size compared with prior study.  Small right lower pole cyst. No evidence of renal calculus or hydronephrosis.  BLADDER: Within normal limits.  REPRODUCTIVE ORGANS: Hysterectomy.    BOWEL: No bowel obstruction. Appendix is normal.  PERITONEUM: No ascites.  VESSELS: Moderate atheromatous of the aorta and branch vessels..  RETROPERITONEUM/LYMPH NODES: No lymphadenopathy.    ABDOMINAL WALL: Within normal limits.  BONES: Moderate degenerative changes of spine. New moderate loss of height of T12 vertebral body when compared to the patient's previous examination.    IMPRESSION:     Increase in size and number of right lung metastases.  Collapse of the left lung with a large pleural effusion. 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  Developing hepatic metastatic focus is suspected.  Increasing metastatic oleg mass of the left axillary region and left supraclavicular region. Skin and subcutaneous thickening of the left chest wall and left breast  New loss of height of T12 vertebral body compared with prior scan.                              NILSA GARZA M.D., ATTENDING RADIOLOGIST  This document has been electronically signed. 2020  8:06PM             (20 @ 19:35)          Imaging Personally Reviewed:  [ ] YES  [ ] NO    Consultants involved in case:   Consultant(s) Notes Reviewed:  [ ] YES  [ ] NO:   Care Discussed with Consultants/Other Providers [ ] YES  [ ] NO ***************************************************************  Dr. Isabella Altamirano  Internal Medicine   [facility] Pager: [phone]  [facility] Pager: [phone]   ***************************************************************    AMY OCHOA  89y  MRN: [id number]    Subjective:    Patient is a 89y old  Female who presents with a chief complaint of Weakness, diarrhea, and cough (2020 15:57)      Interval history/overnight events:    Hypertensive to 130s-170s yesterday, holding home lasix, metoprolol and hydralazine. Will continue to hold as AM bp soft.     SOFIYA ON. This AM, one episode of nb/nb emesis, likely in the setting of malignancy vs. hypercalcemia from malignancy. Zofran ordered PRN nausea (qtc 387 ). Endorses nausea, extreme fatigue, SOB and slight cough. Denies abd pain. Passing gas. Patient Covid neg x 3. Remains on NC 2L, SpO2 95-96%. Pending transfer to non-covid floor.     Family would like to make patient home w/ home hospice. IR consulted for palliative drainage of left pleural effusion. Patient is A&O x 4 and okay to have the procedure to help her breath better.    Labs this morning showing persistent hyperkalemia. Started on lokelma 10 mg daily. Lactate elevated 3.8, however, as per lab, sample was clotted. Will repeat with evening BMP.       MEDICATIONS  (STANDING):  aspirin  chewable 81 milliGRAM(s) Oral daily  calcitonin Injectable 280 International Unit(s) IntraMuscular every 12 hours  cefepime   IVPB 1000 milliGRAM(s) IV Intermittent every 12 hours  cefepime   IVPB      dextrose 5%. 1000 milliLiter(s) (50 mL/Hr) IV Continuous <Continuous>  dextrose 50% Injectable 12.5 Gram(s) IV Push once  dextrose 50% Injectable 25 Gram(s) IV Push once  dextrose 50% Injectable 25 Gram(s) IV Push once  donepezil 10 milliGRAM(s) Oral at bedtime  heparin   Injectable 5000 Unit(s) SubCutaneous every 12 hours  insulin glargine Injectable (LANTUS) 30 Unit(s) SubCutaneous at bedtime  insulin lispro (HumaLOG) corrective regimen sliding scale   SubCutaneous three times a day before meals  insulin lispro Injectable (HumaLOG) 4 Unit(s) SubCutaneous three times a day before meals  simvastatin 20 milliGRAM(s) Oral at bedtime    MEDICATIONS  (PRN):  dextrose 40% Gel 15 Gram(s) Oral once PRN Blood Glucose LESS THAN 70 milliGRAM(s)/deciliter  glucagon  Injectable 1 milliGRAM(s) IntraMuscular once PRN Glucose LESS THAN 70 milligrams/deciliter        Objective:    Vitals: Vital Signs Last 24 Hrs  T(C): 36.6 (24 @ 06:44), Max: 36.6 (20 @ 06:44)  T(F): 97.8 (20 @ 06:44), Max: 97.8 (24-20 @ 06:44)  HR: 104 (24-20 @ 06:44) (79 - 104)  BP: 109/70 (24-20 @ 06:44) (109/70 - 178/78)  BP(mean): --  RR: 18 (20 @ 06:44) (18 - 18)  SpO2: 95% (20 @ 06:44) (95% - 96%)            I&O's Summary    2020 07:01  -  2020 07:00  --------------------------------------------------------  IN: 170 mL / OUT: 550 mL / NET: -380 mL        PHYSICAL EXAM:  GENERAL: elderly female, appearing ill, respiring on 2L NC   HEENT: no scleral icterus  CHEST/LUNG: good air entry right lung, sig diminished air entry on left lung field w/ mild crackles   HEART: RRR, normal S1, S2, no murmurs, +[id number], no rubs appreciated   ABDOMEN: soft, nondistended, non-tender, normoactive, no HSM, no rebound, no guarding, no rigidity  EXTREMITY: 1 plus edema bl LE   SKIN: No rashes or lesions  NERVOUS SYSTEM: Alert & Oriented X3  PSYCH: calm and cooperative     LABS:        135  |  100  |  59<H>  ----------------------------<  145<H>  5.6<H>   |  23  |  2.17<H>      137  |  99  |  58<H>  ----------------------------<  249<H>  6.1<H>   |  20<L>  |  2.21<H>      133<L>  |  99  |  56<H>  ----------------------------<  253<H>  6.2<HH>   |  20<L>  |  2.25<H>    Ca    12.5<H>      2020 22:57  Ca    12.8<H>      2020 12:06  Ca    12.8<H>      2020 09:59    TPro  6.9  /  Alb  3.1<L>  /  TBili  0.4  /  DBili  x   /  AST  23  /  ALT  14  /  AlkPhos  66    TPro  7.2  /  Alb  3.0<L>  /  TBili  0.5  /  DBili  x   /  AST  29  /  ALT  14  /  AlkPhos  73                    Urinalysis Basic - ( 2020 13:36 )    Color: Yellow / Appearance: Turbid / S.020 / pH: x  Gluc: x / Ketone: Negative  / Bili: Negative / Urobili: Negative   Blood: x / Protein: 30 mg/dL / Nitrite: Negative   Leuk Esterase: Large / RBC: 5 /hpf / WBC >50   Sq Epi: x / Non Sq Epi: 3 / Bacteria: Many                              14.7   34.56 )-----------( 350      ( 2020 08:21 )             47.0                         13.5   29.66 )-----------( 385      ( 2020 09:59 )             41.1                         14.1   24.16 )-----------( 409      ( 2020 13:02 )             42.9     CAPILLARY BLOOD GLUCOSE      POCT Blood Glucose.: 132 mg/dL (2020 08:07)  POCT Blood Glucose.: 129 mg/dL (2020 22:55)  POCT Blood Glucose.: 235 mg/dL (2020 17:24)  POCT Blood Glucose.: 242 mg/dL (2020 14:11)  POCT Blood Glucose.: 247 mg/dL (2020 12:06)          RADIOLOGY & ADDITIONAL TESTS:    CT Abdomen and Pelvis No Cont:   EXAM:  CT CHEST                          EXAM:  CT ABDOMEN AND PELVIS                            PROCEDURE DATE:  2020            INTERPRETATION:  CLINICAL INFORMATION: Large left pleural effusion. Clinical concern for empyema or metastatic breast carcinoma. Leukocytosis.    COMPARISON: CT scan of the chest, abdomen and pelvis dated 2019.    PROCEDURE:   CT of the Chest, Abdomen and Pelvis was performed without intravenous contrast.   Intravenous contrast: None.  Oral contrast: None.  Sagittal and coronal reformats were performed.    FINDINGS:    CHEST:     LUNGS AND LARGE AIRWAYS: Patent central airways. There is however severe new collapse of the left lung  New and enlarging pulmonary nodules of aerated right lung compared withprior study. For reference:   A right lower lobe nodule (2:61) currently measures 2.1 cm where previously measured 0.8 cm.  A right upper lobe posterior segment nodule (2:29) currently measures 1.3 cm which previously measured 0.4 cm.    PLEURA: Large new left pleural effusion with new pleural nodularity identified most compatible with metastatic disease involving the left pleural space. For reference there is a left posterior pleural nodule measuring 2.2 x 1.3 cm (2:29).  VESSELS: Within normal limits.  HEART: Heart size is normal. No pericardial effusion.  MEDIASTINUM AND DOMENICA: Left supraclavicular node new measuring 1.8 cm (2:1) which is incompletely imaged.  CHEST WALL AND LOWER NECK: Mild to moderate left axillary adenopathy previously described including 2 nodes measuring 2.5 x 1.8 cm and 2.5 x 1.6 cm now are incorporated into a confluent left axillary mass measuring approximately 9.6 x 5.7 cm (2:29). Increasing addition nodes are present. Skin and subcutaneous thickening of the left breast and left chest wall likely reflecting contiguous neoplastic involvement.    ABDOMEN AND PELVIS:    LIVER: New 1.3 cm nodule at the hepatic dome highly suspicious for a developing metastatic focus (2:61).  BILE DUCTS: Stable dilatation of the common bile duct likely due to patient's postcholecystectomy state.  GALLBLADDER: Status post cholecystectomy.  SPLEEN: Within normal limits.  PANCREAS: Within normal limits.  ADRENALS: Within normal limits.  KIDNEYS/URETERS: Indeterminant 1 cm noduleof the right kidney midpole unchanged in size compared with prior study.  Small right lower pole cyst. No evidence of renal calculus or hydronephrosis.  BLADDER: Within normal limits.  REPRODUCTIVE ORGANS: Hysterectomy.    BOWEL: No bowel obstruction. Appendix is normal.  PERITONEUM: No ascites.  VESSELS: Moderate atheromatous of the aorta and branch vessels..  RETROPERITONEUM/LYMPH NODES: No lymphadenopathy.    ABDOMINAL WALL: Within normal limits.  BONES: Moderate degenerative changes of spine. New moderate loss of height of T12 vertebral body when compared to the patient's previous examination.    IMPRESSION:     Increase in size and number of right lung metastases.  Collapse of the left lung with a large pleural effusion. 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  Developing hepatic metastatic focus is suspected.  Increasing metastatic oleg mass of the left axillary region and left supraclavicular region. Skin and subcutaneous thickening of the left chest wall and left breast  New loss of height of T12 vertebral body compared with prior scan.                              NILSA GARZA M.D., ATTENDING RADIOLOGIST  This document has been electronically signed. 2020  8:06PM             (20 @ 19:35)          Imaging Personally Reviewed:  [ ] YES  [ ] NO    Consultants involved in case:   Consultant(s) Notes Reviewed:  [ ] YES  [ ] NO:   Care Discussed with Consultants/Other Providers [ ] YES  [ ] NO

## 2020-04-24 NOTE — PROGRESS NOTE ADULT - SUBJECTIVE AND OBJECTIVE BOX
SUBJECTIVE AND OBJECTIVE:   89 yo F with Pmhx of breast cancer s/p lumpectomy and hormone therapy, DMII, HTN presents to the ED with complaints of weakness, loss of appetite, cough, SOB, and diarrhea. Patient has been having the symptoms for the last 4 weeks. She has been having decreased PO intake. She also has been becoming progressively weak and as a result, patient was sent to the ED. She has no known COVID exposure. She was recently diagnosed with UTI and was treated with abx for 7 days. His oncologist obtained labs as outpatient and found that she had elevated WBC.   In the vitals were: Tmax 98.7 F, BP 90s, -150s/80-90s mmHg, RR 20, SO2 99 on NC 2L. Her labs were significant for leukocytosis, lymphopenia, bandemia, thrombocytosis, elevated AG, hyperglycemia, lactic acidosis, hypercalcemia, hypercalcemia. UA showed large LE, nitrate, and bacteria.     INTERVAL HPI/OVERNIGHT EVENTS: Patient now with worsening renal function and hyperkalemia. Likely not a good candidate for HD given age and metastatic cancer. Spoke with granddaughterSelene and family is in agreement for comfort directed approach and would like patient to go home with hospice as it was her wish to go home. [facility] called and medical team made aware.    DNR on chart: Yes  Yes    Allergies    No Known Allergies    Intolerances    MEDICATIONS  (STANDING):  aspirin  chewable 81 milliGRAM(s) Oral daily  calcitonin Injectable 280 International Unit(s) IntraMuscular every 12 hours  cefepime   IVPB 1000 milliGRAM(s) IV Intermittent every 12 hours  cefepime   IVPB      dextrose 5%. 1000 milliLiter(s) (50 mL/Hr) IV Continuous <Continuous>  dextrose 50% Injectable 12.5 Gram(s) IV Push once  dextrose 50% Injectable 25 Gram(s) IV Push once  dextrose 50% Injectable 25 Gram(s) IV Push once  donepezil 10 milliGRAM(s) Oral at bedtime  insulin glargine Injectable (LANTUS) 30 Unit(s) SubCutaneous at bedtime  insulin lispro (HumaLOG) corrective regimen sliding scale   SubCutaneous three times a day before meals  insulin lispro Injectable (HumaLOG) 4 Unit(s) SubCutaneous three times a day before meals  simvastatin 20 milliGRAM(s) Oral at bedtime    MEDICATIONS  (PRN):  dextrose 40% Gel 15 Gram(s) Oral once PRN Blood Glucose LESS THAN 70 milliGRAM(s)/deciliter  glucagon  Injectable 1 milliGRAM(s) IntraMuscular once PRN Glucose LESS THAN 70 milligrams/deciliter  ondansetron Injectable 4 milliGRAM(s) IV Push every 6 hours PRN Nausea and/or Vomiting      ITEMS UNCHECKED ARE NOT PRESENT    Due to COVID 19 and in an effort to decrease provider exposure and use of PPE, H&P, ROS and/or PE was taken from primary team's note.    PRESENT SYMPTOMS: [ ]Unable to obtain due to poor mentation   Source if other than patient:  [ ]Family   [ ]Team     Pain:  [ ] yes [ ] no  QOL impact -   Location -                    Aggravating factors -  Quality -  Radiation -  Timing-  Severity (0-10 scale):  Minimal acceptable level (0-10 scale):     Due to COVID 19, ROS per primary team    Dyspnea:                           [ ]Mild [ ]Moderate [ ]Severe  Anxiety:                             [ ]Mild [ ]Moderate [ ]Severe  Fatigue:                             [ ]Mild [ ]Moderate [ ]Severe  Nausea:                             [ ]Mild [ ]Moderate [ ]Severe  Loss of appetite:              [ ]Mild [ ]Moderate [ ]Severe  Constipation:                    [ ]Mild [ ]Moderate [ ]Severe    PAIN AD Score:	  [email or web address] (Ctrl + left click to view)    Other Symptoms:  [ ]All other review of systems negative     Karnofsky Performance Score/Palliative Performance Status Version 2:         %    http://npcrc.org/files/news/palliative_performance_scale_[email or web address].pdf  [email or web address].pdf  PHYSICAL EXAM:  Vital Signs Last 24 Hrs  T(C): 36.4 (2020 12:10), Max: 36.6 (2020 06:44)  T(F): 97.6 (2020 12:10), Max: 97.8 (2020 06:44)  HR: 119 (2020 12:10) (79 - 119)  BP: 126/80 (2020 12:10) (109/70 - 178/78)  BP(mean): --  RR: 18 (2020 12:10) (18 - 18)  SpO2: 97% (2020 12:10) (94% - 97%) I&O's Summary    2020 07: 07:00  --------------------------------------------------------  IN: 170 mL / OUT: 550 mL / NET: -380 mL    2020 07:  -  2020 12:21  --------------------------------------------------------  IN: 160 mL / OUT: 0 mL / NET: 160 mL    GENERAL: Deferred due to COVID 19  [ ]Alert  [ ]Oriented x   [ ]Lethargic  [ ]Cachexia  [ ]Unarousable  [ ]Verbal  [ ]Non-Verbal  Behavioral: Deferred due to COVID 19  [ ] Anxiety  [ ] Delirium [ ] Agitation [ ] Other  HEENT: Deferred due to COVID 19  [ ]Normal   [ ]Dry mouth   [ ]ET Tube/Trach  [ ]Oral lesions  PULMONARY: Deferred due to COVID 19  [ ]Clear [ ]Tachypnea  [ ]Audible excessive secretions   [ ]Rhonchi        [ ]Right [ ]Left [ ]Bilateral  [ ]Crackles        [ ]Right [ ]Left [ ]Bilateral  [ ]Wheezing     [ ]Right [ ]Left [ ]Bilateral  CARDIOVASCULAR: Deferred due to COVID 19   [ ]Regular [ ]Irregular [ ]Tachy  [ ]Tanner [ ]Murmur [ ]Other  GASTROINTESTINAL: Deferred due to COVID 19  [ ]Soft  [ ]Distended   [ ]+BS  [ ]Non tender [ ]Tender  [ ]PEG [ ]OGT/ NGT   Last BM:   GENITOURINARY: Deferred due to COVID 19   [ ]Normal [ ] Incontinent   [ ]Oliguria/Anuria   [ ]Iverson  MUSCULOSKELETAL: Deferred due to COVID 19  [ ]Normal   [ ]Weakness  [ ]Bed/Wheelchair bound [ ]Edema  NEUROLOGIC: Deferred due to COVID 19  [ ]No focal deficits  [ ] Cognitive impairment  [ ] Dysphagia [ ]Dysarthria [ ] Paresis [ ]Other   SKIN:  Deferred due to COVID 19  [ ]Normal   [ ]Pressure ulcer(s)  [ ]Rash    CRITICAL CARE:  [ ] Shock Present  [ ]Septic [ ]Cardiogenic [ ]Neurologic [ ]Hypovolemic  [ ]  Vasopressors [ ]  Inotropes   [ ] Respiratory failure present [ ] Mechanical Ventilation [ ] Non-invasive ventilatory support [ ] High-Flow  [x ] Acute  [ ] Chronic [x ] Hypoxic  [ ] Hypercarbic [ ] Other  [x ] Other organ failure: renal    LABS:                        14.7   34.56 )-----------( 350      ( 2020 08:21 )             47.0       134<L>  |  99  |  58<H>  ----------------------------<  152<H>  6.1<H>   |  19<L>  |  2.19<H>    Ca    12.7<H>      2020 09:46  Phos  3.8       Mg     2.7         TPro  6.9  /  Alb  3.1<L>  /  TBili  0.4  /  DBili  x   /  AST  23  /  ALT  14  /  AlkPhos  66    PT/INR - ( 2020 09:46 )   PT: 12.0 sec;   INR: 1.04 ratio             Urinalysis Basic - ( 2020 13:36 )    Color: Yellow / Appearance: Turbid / S.020 / pH: x  Gluc: x / Ketone: Negative  / Bili: Negative / Urobili: Negative   Blood: x / Protein: 30 mg/dL / Nitrite: Negative   Leuk Esterase: Large / RBC: 5 /hpf / WBC >50   Sq Epi: x / Non Sq Epi: 3 / Bacteria: Many      RADIOLOGY & ADDITIONAL STUDIES:    Protein Calorie Malnutrition Present: [ ] yes [ ] no  [ ] PPSV2 < or = 30%  [ ] significant weight loss [ ] poor nutritional intake [ ] anasarca [ ] catabolic state Albumin, Serum: 3.1 g/dL (20 @ 09:59)  Artificial Nutrition [ ]     REFERRALS:   [ ]Chaplaincy  [ ] Hospice  [ ]Child Life  [ ]Social Work  [ ]Case management [ ]Holistic Therapy     Goals of Care Document:    Care Coordination Assessment [C. Provider] (20 @ 10:54)   Progress Note Adult-Internal Medicine Resident [MATT Altamirano] (20 @ 08:32) SUBJECTIVE AND OBJECTIVE:   89 yo F with Pmhx of breast cancer s/p lumpectomy and hormone therapy, DMII, HTN presents to the ED with complaints of weakness, loss of appetite, cough, SOB, and diarrhea. Patient has been having the symptoms for the last 4 weeks. She has been having decreased PO intake. She also has been becoming progressively weak and as a result, patient was sent to the ED. She has no known COVID exposure. She was recently diagnosed with UTI and was treated with abx for 7 days. His oncologist obtained labs as outpatient and found that she had elevated WBC.   In the vitals were: Tmax 98.7 F, BP 90s, -150s/80-90s mmHg, RR 20, SO2 99 on NC 2L. Her labs were significant for leukocytosis, lymphopenia, bandemia, thrombocytosis, elevated AG, hyperglycemia, lactic acidosis, hypercalcemia, hypercalcemia. UA showed large LE, nitrate, and bacteria.     INTERVAL HPI/OVERNIGHT EVENTS: Patient now with worsening renal function and hyperkalemia. Likely not a good candidate for HD given age and metastatic cancer. Spoke with granddaughterSelene and family is in agreement for comfort directed approach and would like patient to go home with hospice as it was her wish to go home. [facility] called and medical team made aware.    DNR on chart: Yes  Yes    Allergies    No Known Allergies    Intolerances    MEDICATIONS  (STANDING):  aspirin  chewable 81 milliGRAM(s) Oral daily  calcitonin Injectable 280 International Unit(s) IntraMuscular every 12 hours  cefepime   IVPB 1000 milliGRAM(s) IV Intermittent every 12 hours  cefepime   IVPB      dextrose 5%. 1000 milliLiter(s) (50 mL/Hr) IV Continuous <Continuous>  dextrose 50% Injectable 12.5 Gram(s) IV Push once  dextrose 50% Injectable 25 Gram(s) IV Push once  dextrose 50% Injectable 25 Gram(s) IV Push once  donepezil 10 milliGRAM(s) Oral at bedtime  insulin glargine Injectable (LANTUS) 30 Unit(s) SubCutaneous at bedtime  insulin lispro (HumaLOG) corrective regimen sliding scale   SubCutaneous three times a day before meals  insulin lispro Injectable (HumaLOG) 4 Unit(s) SubCutaneous three times a day before meals  simvastatin 20 milliGRAM(s) Oral at bedtime    MEDICATIONS  (PRN):  dextrose 40% Gel 15 Gram(s) Oral once PRN Blood Glucose LESS THAN 70 milliGRAM(s)/deciliter  glucagon  Injectable 1 milliGRAM(s) IntraMuscular once PRN Glucose LESS THAN 70 milligrams/deciliter  ondansetron Injectable 4 milliGRAM(s) IV Push every 6 hours PRN Nausea and/or Vomiting      ITEMS UNCHECKED ARE NOT PRESENT    Due to COVID 19 and in an effort to decrease provider exposure and use of PPE, H&P, ROS and/or PE was taken from primary team's note.    PRESENT SYMPTOMS: [ ]Unable to obtain due to poor mentation   Source if other than patient:  [ ]Family   [ ]Team     Pain:  [ ] yes [ ] no  QOL impact -   Location -                    Aggravating factors -  Quality -  Radiation -  Timing-  Severity (0-10 scale):  Minimal acceptable level (0-10 scale):     Due to COVID 19, ROS per primary team    Dyspnea:                           [ ]Mild [ ]Moderate [ ]Severe  Anxiety:                             [ ]Mild [ ]Moderate [ ]Severe  Fatigue:                             [ ]Mild [ ]Moderate [ ]Severe  Nausea:                             [ ]Mild [ ]Moderate [ ]Severe  Loss of appetite:              [ ]Mild [ ]Moderate [ ]Severe  Constipation:                    [ ]Mild [ ]Moderate [ ]Severe    PAIN AD Score:	  [email or web address] (Ctrl + left click to view)    Other Symptoms:  [ ]All other review of systems negative     Karnofsky Performance Score/Palliative Performance Status Version 2:      20-30   %    [email or web address]/files/news/palliative_performance_scale_[email or web address].pdf  [email or web address]  PHYSICAL EXAM:  Vital Signs Last 24 Hrs  T(C): 36.4 (2020 12:10), Max: 36.6 (2020 06:44)  T(F): 97.6 (2020 12:10), Max: 97.8 (2020 06:44)  HR: 119 (2020 12:10) (79 - 119)  BP: 126/80 (2020 12:10) (109/70 - 178/78)  BP(mean): --  RR: 18 (2020 12:10) (18 - 18)  SpO2: 97% (2020 12:10) (94% - 97%) I&O's Summary    2020 07: 07:00  --------------------------------------------------------  IN: 170 mL / OUT: 550 mL / NET: -380 mL    2020 07:  -  2020 12:21  --------------------------------------------------------  IN: 160 mL / OUT: 0 mL / NET: 160 mL    GENERAL: Deferred due to COVID 19  [ ]Alert  [ ]Oriented x   [ ]Lethargic  [ ]Cachexia  [ ]Unarousable  [ ]Verbal  [ ]Non-Verbal  Behavioral: Deferred due to COVID 19  [ ] Anxiety  [ ] Delirium [ ] Agitation [ ] Other  HEENT: Deferred due to COVID 19  [ ]Normal   [ ]Dry mouth   [ ]ET Tube/Trach  [ ]Oral lesions  PULMONARY: Deferred due to COVID 19  [ ]Clear [ ]Tachypnea  [ ]Audible excessive secretions   [ ]Rhonchi        [ ]Right [ ]Left [ ]Bilateral  [ ]Crackles        [ ]Right [ ]Left [ ]Bilateral  [ ]Wheezing     [ ]Right [ ]Left [ ]Bilateral  CARDIOVASCULAR: Deferred due to COVID 19   [ ]Regular [ ]Irregular [ ]Tachy  [ ]Tanner [ ]Murmur [ ]Other  GASTROINTESTINAL: Deferred due to COVID 19  [ ]Soft  [ ]Distended   [ ]+BS  [ ]Non tender [ ]Tender  [ ]PEG [ ]OGT/ NGT   Last BM:   GENITOURINARY: Deferred due to COVID 19   [ ]Normal [ ] Incontinent   [ ]Oliguria/Anuria   [ ]Iverson  MUSCULOSKELETAL: Deferred due to COVID 19  [ ]Normal   [ ]Weakness  [ ]Bed/Wheelchair bound [ ]Edema  NEUROLOGIC: Deferred due to COVID 19  [ ]No focal deficits  [ ] Cognitive impairment  [ ] Dysphagia [ ]Dysarthria [ ] Paresis [ ]Other   SKIN:  Deferred due to COVID 19  [ ]Normal   [ ]Pressure ulcer(s)  [ ]Rash    CRITICAL CARE:  [ ] Shock Present  [ ]Septic [ ]Cardiogenic [ ]Neurologic [ ]Hypovolemic  [ ]  Vasopressors [ ]  Inotropes   [ ] Respiratory failure present [ ] Mechanical Ventilation [ ] Non-invasive ventilatory support [ ] High-Flow  [x ] Acute  [ ] Chronic [x ] Hypoxic  [ ] Hypercarbic [ ] Other  [x ] Other organ failure: renal    LABS:                        14.7   34.56 )-----------( 350      ( 2020 08:21 )             47.0       134<L>  |  99  |  58<H>  ----------------------------<  152<H>  6.1<H>   |  19<L>  |  2.19<H>    Ca    12.7<H>      2020 09:46  Phos  3.8       Mg     2.7         TPro  6.9  /  Alb  3.1<L>  /  TBili  0.4  /  DBili  x   /  AST  23  /  ALT  14  /  AlkPhos  66    PT/INR - ( 2020 09:46 )   PT: 12.0 sec;   INR: 1.04 ratio      Urinalysis Basic - ( 2020 13:36 )    Color: Yellow / Appearance: Turbid / S.020 / pH: x  Gluc: x / Ketone: Negative  / Bili: Negative / Urobili: Negative   Blood: x / Protein: 30 mg/dL / Nitrite: Negative   Leuk Esterase: Large / RBC: 5 /hpf / WBC >50   Sq Epi: x / Non Sq Epi: 3 / Bacteria: Many      RADIOLOGY & ADDITIONAL STUDIES: no new imaging studies    Protein Calorie Malnutrition Present: [x ] yes [ ] no  [x] PPSV2 < or = 30%  [ ] significant weight loss [x ] poor nutritional intake [ ] anasarca [ ] catabolic state Albumin, Serum: 3.1 g/dL (20 @ 09:59)  Artificial Nutrition [ ]     REFERRALS:   [ ]Chaplaincy  [x ] Hospice  [ ]Child Life  [ ]Social Work  [x]Case management [ ]Holistic Therapy     Goals of Care Document:    Care Coordination Assessment [C. Provider] (20 @ 10:54)   Progress Note Adult-Internal Medicine Resident [MATT Altamirano] (20 @ 08:32)

## 2020-04-24 NOTE — CHART NOTE - NSCHARTNOTEFT_GEN_A_CORE
Transfer Note    Transfer from: [facility]     Transfer to: ( x ) [facility]    (  ) Telemetry     (   ) RCU        (    ) Palliative         (   ) Stroke Unit          (   ) __________________    Accepting Physician:  Signout given to:     HPI/Interval events:    89 yo F with a PMHx of breast cancer s/p lumpectomy and hormone therapy, T2DM, HTN presents to the ED with complaints of weakness, loss of appetite, cough, SOB, and diarrhea, found to have tachycardia, hypoxia, leukocytosis, and b/l pleural infiltrates with L sided pleural effusion, concerning for possible COVID with ?superimposed PNA on cefepime 2mg BID. CT chest showing worsening bl metastatic disease of the lungs, large L pleural effusion w/ complete collapse of left lung. Now Covid neg x 3. Found to have positive UA w/ c/o dysuria on presentation, continued on cefepime. UCx growing >100,000 GNR. Course c/b hypercalcemia likely in the setting of malignancy on calcium lowering agents and persistent hyperkalemia, started on daily lokelma (4/24-).    ASSESSMENT & PLAN:   89 yo F with a PMHx of breast cancer s/p lumpectomy and hormone therapy, T2DM, HTN presents to the ED with complaints of weakness, loss of appetite, cough, SOB, and diarrhea, found to have tachycardia, hypoxia, leukocytosis, and b/l pleural infiltrates with L sided pleural effusion, concerning for possible COVID with ?superimposed PNA, CT chest showing worsening bl metastatic disease of the lungs, large L pleural effusion w/ complete collapse of left lung, now Covid neg x 3; course c/b UTI and hypercalcemia likely in the setting of malignancy and persistent hyperkalemia, started on daily lokelma (4/24-)    Problem/Plan - 1:  ·  Problem: Sepsis, due to unspecified organism, unspecified whether acute organ dysfunction present.  Plan: Meeting SIRS criteria on presentation 2/2 sig leukocytosis w/ bandemia, tachycardia and tachypnea w/ lactic acidosis at 3.2, all concerning for sepsis 2/2 possible PNA vs. UTI given UA+ for 50 WBC, large LE, many bacteria. other ddx include worsening malignancy with inc metastatic burden   - wbc uptrending patient remains afebrile  - UCx >100,000 GNR   - ID recs appreciated   - c/w cefepime 1g q12 (per ID recommendations) dosed renally given decreased GFR  - f/u repeat lactate.     Problem/Plan - 2:  ·  Problem: Suspected 2019 novel coronavirus infection.  Plan: Covid neg x 3  - low O2 requirement likely from large pleural effusion   - transfer to noncovid floor.     Problem/Plan - 3:  ·  Problem: Hypercalcemia.  Plan: Ca elevated to 12.5, concern for HyperCA of malignancy given hx of breast cancer and worsening metastatic disease on CTCAP w/ new lin involvement: inc tumor burden bl lungs, possible new hepatic metastasis, worsening disease of left breast/chest wall, new height loss T2  - c/w calcitonin 4mg/kg for total of 4 doses  - s/p pamidronate 60 mg x 6 hours 4/23  - iPTH 4, low; vit d 43 wnl, TSH 0.8 wnl   - Endo recs appreciated   - Onc recs appreciated   - trend BMP q12 hrs   - f/u PTHrP  - zofran q4 hrs for nausea, likely related to malignancy and hypercalcemia.     Problem/Plan - 4:  ·  Problem: Pleural effusion.  Plan: L sided pleural effusion likely metastatic in nature. CT chest showing complete collapse of left lung with large pleural effusion   - c/w cefepime 1g q12 hours for possible bacterial superinfection   - IR consulted for chest tube placement; holding hep subq, INR wnl   - IR recs appreciated: pigtail catheter not recommended at this time as patient to be d/[place] to hospice and cannot go with pigtail, needs pleurex cathether, defer to CT surgery or pulm    Problem/Plan - 5:  ·  Problem: Electrolyte abnormality.  Plan: Patient w/ evidence of CKD, Bl Cr 2.2-2.5. Presented w/ lactate 3.2, likely in setting of acute infection. Now persistently hyperkalemic w/ elevated Mg, uptrending lactate   - s/p lokelma 10 mg x 1 ON  - start daily lokelma 10mg for persistent hyperkalemia    - patient likely with impending renal failure; will forgo dialysis at this time as family would like to make patient hospice care   - f/u repeat lactate as initial sample clotted   - CTM BMP q12 hrs  - CTM UOP closely.     Problem/Plan - 6:  Problem: Urinary tract infection without hematuria, site unspecified. Plan: c/o incontinence and dysuria on presentation. UA positive for large LE, many bacteria, pyuria 50    - UCx >100,000 GNR   - c/w cefepime 1g q12 (4/23-)  - f/u final cx and SN     Problem/Plan - 7:  ·  Problem: Essential hypertension.  Plan: BPs elevated   - holding home lasix, hydralazine and metoprolol succinate   - will introduce home antihypertensives as needed.     Problem/Plan - 8:  ·  Problem: Type 2 diabetes mellitus with other specified complication, with long-term current use of insulin.  Plan: Endo recs appreciated   - A1C elevated at 10  - c/w 30 U lantus with 4 units premeal  - moderate ISS.     Problem/Plan - 9:  ·  Problem: Goals of care, counseling/discussion.  Plan: Patient now DNR/DNI, home w/ home hospice. Hospice referral provided   - waiting for family/HCP granddaughter to decide on comfort measures only.     Problem/Plan - 10:  Problem: Preventive measure. Plan; DVT-hep sub q given CKD   Diet-DASH/consistent carbs  Dispo: Home w/ home hospice, referral provided 4/24   Transitions of Care Status:  1.  Name of PCP: Dr. Sadler   2.  PCP Contacted on Admission: [x ] Y    [ ] N    3.  PCP contacted at Discharge: [ ] Y    [ ] N    [ ] N/A  4.  Post-Discharge Appointment Date and Location:  5.  Summary of Handoff given to PCP:    FOR FOLLOW UP:  [] CT surgery vs. pulm consult for pleurex catheter   [] CTM BMP q12 for Ca and K   [] continue abx for ?superimposed bacterial PNA and UTI    [] hospice f/u  [] restart hep subq once chest tube placed or timing decided on Transfer Note    Transfer from: [facility]     Transfer to: ( x ) [facility]    (  ) Telemetry     (   ) RCU        (    ) Palliative         (   ) Stroke Unit          (   ) __________________    Accepting Physician:  Signout given to:     HPI/Interval events:    89 yo F with a PMHx of breast cancer s/p lumpectomy and hormone therapy, T2DM, HTN presents to the ED with complaints of weakness, loss of appetite, cough, SOB, and diarrhea, found to have tachycardia, hypoxia, leukocytosis, and b/l pleural infiltrates with L sided pleural effusion, concerning for possible COVID with ?superimposed PNA on cefepime 2g BID. CT chest showing worsening bl metastatic disease of the lungs, large L pleural effusion w/ complete collapse of left lung. Now Covid neg x 3. Found to have positive UA w/ c/o dysuria on presentation, continued on cefepime. UCx growing >100,000 GNR. Course c/b hypercalcemia likely in the setting of malignancy on calcium lowering agents and persistent hyperkalemia, started on daily lokelma (4/24-).    ASSESSMENT & PLAN:   89 yo F with a PMHx of breast cancer s/p lumpectomy and hormone therapy, T2DM, HTN presents to the ED with complaints of weakness, loss of appetite, cough, SOB, and diarrhea, found to have tachycardia, hypoxia, leukocytosis, and b/l pleural infiltrates with L sided pleural effusion, concerning for possible COVID with ?superimposed PNA, CT chest showing worsening bl metastatic disease of the lungs, large L pleural effusion w/ complete collapse of left lung, now Covid neg x 3; course c/b UTI and hypercalcemia likely in the setting of malignancy and persistent hyperkalemia, started on daily lokelma (4/24-)    Problem/Plan - 1:  ·  Problem: Sepsis, due to unspecified organism, unspecified whether acute organ dysfunction present.  Plan: Meeting SIRS criteria on presentation 2/2 sig leukocytosis w/ bandemia, tachycardia and tachypnea w/ lactic acidosis at 3.2, all concerning for sepsis 2/2 possible PNA vs. UTI given UA+ for 50 WBC, large LE, many bacteria. other ddx include worsening malignancy with inc metastatic burden   - wbc uptrending patient remains afebrile  - UCx >100,000 GNR   - ID recs appreciated   - c/w cefepime 1g q12 (per ID recommendations) dosed renally given decreased GFR  - f/u repeat lactate.     Problem/Plan - 2:  ·  Problem: Suspected 2019 novel coronavirus infection.  Plan: Covid neg x 3  - low O2 requirement likely from large pleural effusion   - transfer to noncovid floor.     Problem/Plan - 3:  ·  Problem: Hypercalcemia.  Plan: Ca elevated to 12.5, concern for HyperCA of malignancy given hx of breast cancer and worsening metastatic disease on CTCAP w/ new lin involvement: inc tumor burden bl lungs, possible new hepatic metastasis, worsening disease of left breast/chest wall, new height loss T2  - c/w calcitonin 4mg/kg for total of 4 doses  - s/p pamidronate 60 mg x 6 hours 4/23  - iPTH 4, low; vit d 43 wnl, TSH 0.8 wnl   - Endo recs appreciated   - Onc recs appreciated   - trend BMP q12 hrs   - f/u PTHrP  - zofran q4 hrs for nausea, likely related to malignancy and hypercalcemia.     Problem/Plan - 4:  ·  Problem: Pleural effusion.  Plan: L sided pleural effusion likely metastatic in nature. CT chest showing complete collapse of left lung with large pleural effusion   - c/w cefepime 1g q12 hours for possible bacterial superinfection   - IR consulted for chest tube placement; holding hep subq, INR wnl   - IR recs appreciated: pigtail catheter not recommended at this time as patient to be d/[place] to hospice and cannot go with pigtail, needs pleurex cathether, defer to CT surgery or pulm    Problem/Plan - 5:  ·  Problem: Electrolyte abnormality.  Plan: Patient w/ evidence of CKD, Bl Cr 2.2-2.5. Presented w/ lactate 3.2, likely in setting of acute infection. Now persistently hyperkalemic w/ elevated Mg, uptrending lactate   - s/p lokelma 10 mg x 1 ON  - start daily lokelma 10mg for persistent hyperkalemia    - patient likely with impending renal failure; will forgo dialysis at this time as family would like to make patient hospice care   - f/u repeat lactate as initial sample clotted   - CTM BMP q12 hrs  - CTM UOP closely.     Problem/Plan - 6:  Problem: Urinary tract infection without hematuria, site unspecified. Plan: c/o incontinence and dysuria on presentation. UA positive for large LE, many bacteria, pyuria 50    - UCx >100,000 GNR   - c/w cefepime 1g q12 (4/23-)  - f/u final cx and SN     Problem/Plan - 7:  ·  Problem: Essential hypertension.  Plan: BPs elevated   - holding home lasix, hydralazine and metoprolol succinate   - will introduce home antihypertensives as needed.     Problem/Plan - 8:  ·  Problem: Type 2 diabetes mellitus with other specified complication, with long-term current use of insulin.  Plan: Endo recs appreciated   - A1C elevated at 10  - c/w 30 U lantus with 4 units premeal  - moderate ISS.     Problem/Plan - 9:  ·  Problem: Goals of care, counseling/discussion.  Plan: Patient now DNR/DNI, home w/ home hospice. Hospice referral provided   - waiting for family/HCP granddaughter to decide on comfort measures only.     Problem/Plan - 10:  Problem: Preventive measure. Plan; DVT-hep sub q given CKD   Diet-DASH/consistent carbs  Dispo: Home w/ home hospice, referral provided 4/24   Transitions of Care Status:  1.  Name of PCP: Dr. Sadler   2.  PCP Contacted on Admission: [x ] Y    [ ] N    3.  PCP contacted at Discharge: [ ] Y    [ ] N    [ ] N/A  4.  Post-Discharge Appointment Date and Location:  5.  Summary of Handoff given to PCP:    FOR FOLLOW UP:  [] CT surgery vs. pulm consult for pleurex catheter   [] CTM BMP q12 for Ca and K   [] continue abx for ?superimposed bacterial PNA and UTI    [] hospice f/u  [] restart hep subq once chest tube placed or timing decided on Transfer Note    Transfer from: [facility]     Transfer to: ( x ) [facility]    (  ) Telemetry     (   ) RCU        (    ) Palliative         (   ) Stroke Unit          (   ) __________________    Accepting Physician:  Signout given to: Jyotsna [facility]     HPI/Interval events:    89 yo F with a PMHx of breast cancer s/p lumpectomy and hormone therapy, T2DM, HTN presents to the ED with complaints of weakness, loss of appetite, cough, SOB, and diarrhea, found to have tachycardia, hypoxia, leukocytosis, and b/l pleural infiltrates with L sided pleural effusion, concerning for possible COVID with ?superimposed PNA on cefepime 2g BID. CT chest showing worsening bl metastatic disease of the lungs, large L pleural effusion w/ complete collapse of left lung. Now Covid neg x 3. Found to have positive UA w/ c/o dysuria on presentation, continued on cefepime. UCx growing >100,000 GNR. Course c/b hypercalcemia likely in the setting of malignancy on calcium lowering agents and persistent hyperkalemia, started on daily lokelma (4/24-).    ASSESSMENT & PLAN:   89 yo F with a PMHx of breast cancer s/p lumpectomy and hormone therapy, T2DM, HTN presents to the ED with complaints of weakness, loss of appetite, cough, SOB, and diarrhea, found to have tachycardia, hypoxia, leukocytosis, and b/l pleural infiltrates with L sided pleural effusion, concerning for possible COVID with ?superimposed PNA, CT chest showing worsening bl metastatic disease of the lungs, large L pleural effusion w/ complete collapse of left lung, now Covid neg x 3; course c/b UTI and hypercalcemia likely in the setting of malignancy and persistent hyperkalemia, started on daily lokelma (4/24-)    Problem/Plan - 1:  ·  Problem: Sepsis, due to unspecified organism, unspecified whether acute organ dysfunction present.  Plan: Meeting SIRS criteria on presentation 2/2 sig leukocytosis w/ bandemia, tachycardia and tachypnea w/ lactic acidosis at 3.2, all concerning for sepsis 2/2 possible PNA vs. UTI given UA+ for 50 WBC, large LE, many bacteria. other ddx include worsening malignancy with inc metastatic burden   - wbc uptrending patient remains afebrile  - UCx >100,000 GNR   - ID recs appreciated   - c/w cefepime 1g q12 (per ID recommendations) dosed renally given decreased GFR  - f/u repeat lactate.     Problem/Plan - 2:  ·  Problem: Suspected 2019 novel coronavirus infection.  Plan: Covid neg x 3  - low O2 requirement likely from large pleural effusion   - transfer to noncovid floor.     Problem/Plan - 3:  ·  Problem: Hypercalcemia.  Plan: Ca elevated to 12.5, concern for HyperCA of malignancy given hx of breast cancer and worsening metastatic disease on CTCAP w/ new lin involvement: inc tumor burden bl lungs, possible new hepatic metastasis, worsening disease of left breast/chest wall, new height loss T2  - c/w calcitonin 4mg/kg for total of 4 doses  - s/p pamidronate 60 mg x 6 hours 4/23  - iPTH 4, low; vit d 43 wnl, TSH 0.8 wnl   - Endo recs appreciated   - Onc recs appreciated   - trend BMP q12 hrs   - f/u PTHrP  - zofran q4 hrs for nausea, likely related to malignancy and hypercalcemia.     Problem/Plan - 4:  ·  Problem: Pleural effusion.  Plan: L sided pleural effusion likely metastatic in nature. CT chest showing complete collapse of left lung with large pleural effusion   - c/w cefepime 1g q12 hours for possible bacterial superinfection   - IR consulted for chest tube placement; holding hep subq, INR wnl   - IR recs appreciated: pigtail catheter not recommended at this time as patient to be d/[place] to hospice and cannot go with pigtail, needs pleurex cathether, defer to CT surgery or pulm    Problem/Plan - 5:  ·  Problem: Electrolyte abnormality.  Plan: Patient w/ evidence of CKD, Bl Cr 2.2-2.5. Presented w/ lactate 3.2, likely in setting of acute infection. Now persistently hyperkalemic w/ elevated Mg, uptrending lactate   - s/p lokelma 10 mg x 1 ON  - start daily lokelma 10mg for persistent hyperkalemia    - patient likely with impending renal failure; will forgo dialysis at this time as family would like to make patient hospice care   - f/u repeat lactate as initial sample clotted   - CTM BMP q12 hrs  - CTM UOP closely.     Problem/Plan - 6:  Problem: Urinary tract infection without hematuria, site unspecified. Plan: c/o incontinence and dysuria on presentation. UA positive for large LE, many bacteria, pyuria 50    - UCx >100,000 GNR   - c/w cefepime 1g q12 (4/23-)  - f/u final cx and SN     Problem/Plan - 7:  ·  Problem: Essential hypertension.  Plan: BPs elevated   - holding home lasix, hydralazine and metoprolol succinate   - will introduce home antihypertensives as needed.     Problem/Plan - 8:  ·  Problem: Type 2 diabetes mellitus with other specified complication, with long-term current use of insulin.  Plan: Endo recs appreciated   - A1C elevated at 10  - c/w 30 U lantus with 4 units premeal  - moderate ISS.     Problem/Plan - 9:  ·  Problem: Goals of care, counseling/discussion.  Plan: Patient now DNR/DNI, home w/ home hospice. Hospice referral provided   - waiting for family/HCP granddaughter to decide on comfort measures only.     Problem/Plan - 10:  Problem: Preventive measure. Plan; DVT-hep sub q given CKD   Diet-DASH/consistent carbs  Dispo: Home w/ home hospice, referral provided 4/24   Transitions of Care Status:  1.  Name of PCP: Dr. Sadler   2.  PCP Contacted on Admission: [x ] Y    [ ] N    3.  PCP contacted at Discharge: [ ] Y    [ ] N    [ ] N/A  4.  Post-Discharge Appointment Date and Location:  5.  Summary of Handoff given to PCP:    FOR FOLLOW UP:  [] CT surgery vs. pulm consult for pleurex catheter   [] CTM BMP q12 for Ca and K   [] continue abx for ?superimposed bacterial PNA and UTI    [] hospice f/u  [] restart hep subq once chest tube placed or timing decided on Transfer Note    Transfer from: [facility]     Transfer to: ( x ) [facility]    (  ) Telemetry     (   ) RCU        (    ) Palliative         (   ) Stroke Unit          (   ) __________________    Accepting Physician:  Signout given to: Jyotsna [facility]     HPI/Interval events:    89 yo F with a PMHx of breast cancer s/p lumpectomy and hormone therapy, T2DM, HTN presents to the ED with complaints of weakness, loss of appetite, cough, SOB, and diarrhea, found to have tachycardia, hypoxia, leukocytosis, and b/l pleural infiltrates with L sided pleural effusion, concerning for possible COVID with ?superimposed PNA on cefepime 2g BID. CT chest showing worsening bl metastatic disease of the lungs, large L pleural effusion w/ complete collapse of left lung. Now Covid neg x 3. Found to have positive UA w/ c/o dysuria on presentation, continued on cefepime. UCx growing >100,000 GNR. Course c/b hypercalcemia likely in the setting of malignancy on calcium lowering agents and persistent hyperkalemia, started on daily lokelma (4/24-).    ASSESSMENT & PLAN:   89 yo F with a PMHx of breast cancer s/p lumpectomy and hormone therapy, T2DM, HTN presents to the ED with complaints of weakness, loss of appetite, cough, SOB, and diarrhea, found to have tachycardia, hypoxia, leukocytosis, and b/l pleural infiltrates with L sided pleural effusion, concerning for possible COVID with ?superimposed PNA, CT chest showing worsening bl metastatic disease of the lungs, large L pleural effusion w/ complete collapse of left lung, now Covid neg x 3; course c/b UTI and hypercalcemia likely in the setting of malignancy and persistent hyperkalemia, started on daily lokelma (4/24-)    Problem/Plan - 1:  ·  Problem: Sepsis, due to unspecified organism, unspecified whether acute organ dysfunction present.  Plan: Meeting SIRS criteria on presentation 2/2 sig leukocytosis w/ bandemia, tachycardia and tachypnea w/ lactic acidosis at 3.2, all concerning for sepsis 2/2 possible PNA vs. UTI given UA+ for 50 WBC, large LE, many bacteria. other ddx include worsening malignancy with inc metastatic burden   - wbc uptrending patient remains afebrile  - UCx >100,000 GNR   - ID recs appreciated   - c/w cefepime 1g q12 (per ID recommendations) dosed renally given decreased GFR  - f/u repeat lactate.     Problem/Plan - 2:  ·  Problem: Suspected 2019 novel coronavirus infection.  Plan: Covid neg x 3  - low O2 requirement likely from large pleural effusion   - transfer to noncovid floor.     Problem/Plan - 3:  ·  Problem: Hypercalcemia.  Plan: Ca elevated to 12.5, concern for HyperCA of malignancy given hx of breast cancer and worsening metastatic disease on CTCAP w/ new lin involvement: inc tumor burden bl lungs, possible new hepatic metastasis, worsening disease of left breast/chest wall, new height loss T2  - c/w calcitonin 4mg/kg for total of 4 doses  - s/p pamidronate 60 mg x 6 hours 4/23  - iPTH 4, low; vit d 43 wnl, TSH 0.8 wnl   - Endo recs appreciated   - Onc recs appreciated   - trend BMP q12 hrs   - f/u PTHrP  - zofran q4 hrs for nausea, likely related to malignancy and hypercalcemia.     Problem/Plan - 4:  ·  Problem: Pleural effusion.  Plan: L sided pleural effusion likely metastatic in nature. CT chest showing complete collapse of left lung with large pleural effusion   - c/w cefepime 1g q12 hours for possible bacterial superinfection   - IR consulted for chest tube placement; holding hep subq, INR wnl   - IR recs appreciated: pigtail catheter not recommended at this time as patient to be d/[place] to hospice and cannot go with pigtail, needs pleurex cathether, defer to CT surgery or pulm    Problem/Plan - 5:  ·  Problem: Electrolyte abnormality.  Plan: Patient w/ evidence of CKD, Bl Cr 2.2-2.5. Presented w/ lactate 3.2, likely in setting of acute infection. Now persistently hyperkalemic w/ elevated Mg, uptrending lactate   - s/p lokelma 10 mg x 1 ON  - start daily lokelma 10mg for persistent hyperkalemia    - patient likely with impending renal failure; will forgo dialysis at this time as family would like to make patient hospice care   - f/u repeat lactate as initial sample clotted   - CTM BMP q12 hrs  - CTM UOP closely.     Problem/Plan - 6:  Problem: Urinary tract infection without hematuria, site unspecified. Plan: c/o incontinence and dysuria on presentation. UA positive for large LE, many bacteria, pyuria 50    - UCx >100,000 GNR   - c/w cefepime 1g q12 (4/23-)  - f/u final cx and SN     Problem/Plan - 7:  ·  Problem: Essential hypertension.  Plan: BPs elevated   - holding home lasix, hydralazine and metoprolol succinate   - will introduce home antihypertensives as needed.     Problem/Plan - 8:  ·  Problem: Type 2 diabetes mellitus with other specified complication, with long-term current use of insulin.  Plan: Endo recs appreciated   - A1C elevated at 10  - c/w 30 U lantus with 4 units premeal  - moderate ISS.     Problem/Plan - 9:  ·  Problem: Goals of care, counseling/discussion.  Plan: Patient now DNR/DNI, home w/ home hospice. Hospice referral provided   - waiting for family/HCP granddaughter to decide on comfort measures only.     Problem/Plan - 10:  Problem: Preventive measure. Plan; DVT-hep sub q given CKD   Diet-DASH/consistent carbs  Dispo: Home w/ home hospice, referral provided 4/24   Transitions of Care Status:  1.  Name of PCP: Dr. Sadler   2.  PCP Contacted on Admission: [x ] Y    [ ] N    3.  PCP contacted at Discharge: [ ] Y    [ ] N    [ ] N/A  4.  Post-Discharge Appointment Date and Location:  5.  Summary of Handoff given to PCP:    FOR FOLLOW UP:  [] CT surgery vs. pulm consult for pleurex catheter   [] CTM BMP q12 for Ca and K   [] continue abx for ?superimposed bacterial PNA and UTI  --> would likely continue prolonged course of abx for UTI in immunocompromised patient (5-7 days); can curbside ID   [] hospice f/u  [] restart hep subq once chest tube placed or timing decided on

## 2020-04-24 NOTE — PROGRESS NOTE ADULT - PROBLEM SELECTOR PLAN 9
DVT-hep sub q given CKD   Diet-DASH/consistent carbs  Dispo: pending   Transitions of Care Status:  1.  Name of PCP: Dr. Sadler   2.  PCP Contacted on Admission: [x ] Y    [ ] N    3.  PCP contacted at Discharge: [ ] Y    [ ] N    [ ] N/A  4.  Post-Discharge Appointment Date and Location:  5.  Summary of Handoff given to PCP: Patient now DNR/DNI, home w/ home hospice. Hospice referral provided   - waiting for family/HCP granddaughter to decide on comfort measures only Patient now DNR/DNI, home w/ home hospice  - Hospice referral provided 4/24  - as per family, would want IV abx and palliative drainage of pleural effusion

## 2020-04-24 NOTE — PROGRESS NOTE ADULT - PROBLEM SELECTOR PLAN 7
BPs elevated   - holding home lasix, hydralazine and metoprolol succinate   - will introduce metoprolol and hydralazine as needed BPs elevated   - holding home lasix, hydralazine and metoprolol succinate   - will introduce home antihypertensives as needed

## 2020-04-24 NOTE — PROGRESS NOTE ADULT - PROBLEM SELECTOR PLAN 2
hx of diabetes with worsening glycemic control for the past month due to recurrent UTI infection according to the daughter.   hgb a1c 10   -continue lantus to 30 units qhs  -discontinue humalog 4 units tidac  -moderate dose sliding scale tidac     discharge  Basal + oral (prandin) depending on nutritional status and insulin requirement.   f/u PCP

## 2020-04-24 NOTE — GOALS OF CARE CONVERSATION - ADVANCED CARE PLANNING - CONVERSATION DETAILS
RN spoke with family member Tye . via telephone, explained home hospice poc with return agreement. [facility] consents emailed to Tye. DME to be delivered on Saturday. Family collaborated with Dr Donny Morales today, pt to have IV ABTX and fluid removal prior to discharge home.

## 2020-04-24 NOTE — PROGRESS NOTE ADULT - PROBLEM SELECTOR PLAN 6
c/o incontinence and dysuria on presentation. UA positive for large LE, many bacteria, pyuria 50    - UCx >100,000 GNR   - c/w cefepime 1g q12   - f/u final cx and SN

## 2020-04-24 NOTE — PROGRESS NOTE ADULT - PROBLEM SELECTOR PLAN 4
consider discontinuing statin given age 89    Jorge L Sadler MD, Endocrine Fellow   Pager  from 9-5PM. After hours and on weekends please call [phone].

## 2020-04-24 NOTE — PROGRESS NOTE ADULT - PROBLEM SELECTOR PLAN 8
Endo recs appreciated   - A1C elevated at 10  - c/w 30 U lantus with 4 units premeal  - moderate ISS

## 2020-04-24 NOTE — PROGRESS NOTE ADULT - SUBJECTIVE AND OBJECTIVE BOX
Chief Complaint: Hypercalcemia and T2DM     History: Spoke with patient and primary team. Nausea and vomiting. Not tolerating po.     MEDICATIONS  (STANDING):  aspirin  chewable 81 milliGRAM(s) Oral daily  calcitonin Injectable 280 International Unit(s) IntraMuscular every 12 hours  cefepime   IVPB 1000 milliGRAM(s) IV Intermittent every 12 hours  cefepime   IVPB      dextrose 5%. 1000 milliLiter(s) (50 mL/Hr) IV Continuous <Continuous>  dextrose 50% Injectable 12.5 Gram(s) IV Push once  dextrose 50% Injectable 25 Gram(s) IV Push once  dextrose 50% Injectable 25 Gram(s) IV Push once  donepezil 10 milliGRAM(s) Oral at bedtime  insulin glargine Injectable (LANTUS) 30 Unit(s) SubCutaneous at bedtime  insulin lispro (HumaLOG) corrective regimen sliding scale   SubCutaneous three times a day before meals  insulin lispro Injectable (HumaLOG) 4 Unit(s) SubCutaneous three times a day before meals  simvastatin 20 milliGRAM(s) Oral at bedtime    MEDICATIONS  (PRN):  dextrose 40% Gel 15 Gram(s) Oral once PRN Blood Glucose LESS THAN 70 milliGRAM(s)/deciliter  glucagon  Injectable 1 milliGRAM(s) IntraMuscular once PRN Glucose LESS THAN 70 milligrams/deciliter  ondansetron Injectable 4 milliGRAM(s) IV Push every 6 hours PRN Nausea and/or Vomiting      Allergies    No Known Allergies    Intolerances        ROS: All other systems reviewed and negative    PHYSICAL EXAM:  VITALS: T(C): 36.4 (04-24-20 @ 12:10)  T(F): 97.6 (04-24-20 @ 12:10), Max: 97.8 (04-24-20 @ 06:44)  HR: 119 (04-24-20 @ 12:10) (85 - 119)  BP: 126/80 (04-24-20 @ 12:10) (109/70 - 158/70)  RR:  (18 - 18)  SpO2:  (94% - 97%)  Wt(kg): --    POCT Blood Glucose.: 185 mg/dL (04-24-20 @ 12:53)H0  POCT Blood Glucose.: 132 mg/dL (04-24-20 @ 08:07)H0  POCT Blood Glucose.: 129 mg/dL (04-23-20 @ 22:55)L30  POCT Blood Glucose.: 235 mg/dL (04-23-20 @ 17:24)H4+4  POCT Blood Glucose.: 242 mg/dL (04-23-20 @ 14:11)  POCT Blood Glucose.: 247 mg/dL (04-23-20 @ 12:06)H+4  POCT Blood Glucose.: 229 mg/dL (04-23-20 @ 08:04)H+2  POCT Blood Glucose.: 300 mg/dL (04-22-20 @ 21:45)L35  POCT Blood Glucose.: 382 mg/dL (04-22-20 @ 18:11)H+5    04-24    134<L>  |  99  |  58<H>  ----------------------------<  152<H>  6.1<H>   |  19<L>  |  2.19<H>    EGFR if : 21<L>  EGFR if non : 18<L>    Ca    12.7<H>      04-24  Mg     2.7     04-24  Phos  3.8     04-24    TPro  6.9  /  Alb  3.1<L>  /  TBili  0.4  /  DBili  x   /  AST  23  /  ALT  14  /  AlkPhos  66  04-23          Thyroid Function Tests:  04-23 @ 12:24 TSH 0.80 FreeT4 -- T3 -- Anti TPO -- Anti Thyroglobulin Ab -- TSI -- Chief Complaint: Hypercalcemia and T2DM     History: Spoke with patient and primary team. Nausea and vomiting. Not tolerating po.     MEDICATIONS  (STANDING):  aspirin  chewable 81 milliGRAM(s) Oral daily  calcitonin Injectable 280 International Unit(s) IntraMuscular every 12 hours  cefepime   IVPB 1000 milliGRAM(s) IV Intermittent every 12 hours  cefepime   IVPB      dextrose 5%. 1000 milliLiter(s) (50 mL/Hr) IV Continuous <Continuous>  dextrose 50% Injectable 12.5 Gram(s) IV Push once  dextrose 50% Injectable 25 Gram(s) IV Push once  dextrose 50% Injectable 25 Gram(s) IV Push once  donepezil 10 milliGRAM(s) Oral at bedtime  insulin glargine Injectable (LANTUS) 30 Unit(s) SubCutaneous at bedtime  insulin lispro (HumaLOG) corrective regimen sliding scale   SubCutaneous three times a day before meals  insulin lispro Injectable (HumaLOG) 4 Unit(s) SubCutaneous three times a day before meals  simvastatin 20 milliGRAM(s) Oral at bedtime    MEDICATIONS  (PRN):  dextrose 40% Gel 15 Gram(s) Oral once PRN Blood Glucose LESS THAN 70 milliGRAM(s)/deciliter  glucagon  Injectable 1 milliGRAM(s) IntraMuscular once PRN Glucose LESS THAN 70 milligrams/deciliter  ondansetron Injectable 4 milliGRAM(s) IV Push every 6 hours PRN Nausea and/or Vomiting      Allergies    No Known Allergies    Intolerances        ROS: All other systems reviewed and negative    PHYSICAL EXAM:  VITALS: T(C): 36.4 (04-24-20 @ 12:10)  T(F): 97.6 (04-24-20 @ 12:10), Max: 97.8 (04-24-20 @ 06:44)  HR: 119 (04-24-20 @ 12:10) (85 - 119)  BP: 126/80 (04-24-20 @ 12:10) (109/70 - 158/70)  RR:  (18 - 18)  SpO2:  (94% - 97%)  Wt(kg): --  Deferred. Please refer to Pulmonary PE    POCT Blood Glucose.: 185 mg/dL (04-24-20 @ 12:53)H0  POCT Blood Glucose.: 132 mg/dL (04-24-20 @ 08:07)H0  POCT Blood Glucose.: 129 mg/dL (04-23-20 @ 22:55)L30  POCT Blood Glucose.: 235 mg/dL (04-23-20 @ 17:24)H4+4  POCT Blood Glucose.: 242 mg/dL (04-23-20 @ 14:11)  POCT Blood Glucose.: 247 mg/dL (04-23-20 @ 12:06)H+4  POCT Blood Glucose.: 229 mg/dL (04-23-20 @ 08:04)H+2  POCT Blood Glucose.: 300 mg/dL (04-22-20 @ 21:45)L35  POCT Blood Glucose.: 382 mg/dL (04-22-20 @ 18:11)H+5    04-24    134<L>  |  99  |  58<H>  ----------------------------<  152<H>  6.1<H>   |  19<L>  |  2.19<H>    EGFR if : 21<L>  EGFR if non : 18<L>    Ca    12.7<H>      04-24  Mg     2.7     04-24  Phos  3.8     04-24    TPro  6.9  /  Alb  3.1<L>  /  TBili  0.4  /  DBili  x   /  AST  23  /  ALT  14  /  AlkPhos  66  04-23          Thyroid Function Tests:  04-23 @ 12:24 TSH 0.80 FreeT4 -- T3 -- Anti TPO -- Anti Thyroglobulin Ab -- TSI --

## 2020-04-24 NOTE — PROGRESS NOTE ADULT - PROBLEM SELECTOR PLAN 2
Covid neg x 3  - low O2 requirment  - wean as tolerated  - transfer to noncovid floor Covid neg x 3  - low O2 requirement likely from large pleural effusion   - transfer to noncovid floor

## 2020-04-24 NOTE — PROGRESS NOTE ADULT - PROBLEM SELECTOR PLAN 3
Ca elevated to 12.5, concern for HyperCA of malignancy given hx of breast cancer and worsening metastatic disease on CTCAP w/ new lin involvement: inc tumor burden bl lungs, possible new hepatic metastasis, worsening disease of left breast/chest wall, new height loss T2  - c/w calcitonin 4mg/kg for total of 4 doses  - s/p pamidronate 60 mg x 6 hours 4/23  - iPTH 4, low; vit d 43 wnl, TSH 0.8 wnl   - Endo recs appreciated   - Onc recs appreciated   - continue to trend BMP q8 hrs  - f/u PTHrP Ca elevated to 12.5, concern for HyperCA of malignancy given hx of breast cancer and worsening metastatic disease on CTCAP w/ new lin involvement: inc tumor burden bl lungs, possible new hepatic metastasis, worsening disease of left breast/chest wall, new height loss T2  - c/w calcitonin 4mg/kg for total of 4 doses  - s/p pamidronate 60 mg x 6 hours 4/23  - iPTH 4, low; vit d 43 wnl, TSH 0.8 wnl   - Endo recs appreciated   - Onc recs appreciated   - continue to trend BMP q8 hrs  - f/u PTHrP  - zofran q4 hrs for nausea, likely related to malignancy and hypercalcemia Ca elevated to 12.5, concern for HyperCA of malignancy given hx of breast cancer and worsening metastatic disease on CTCAP w/ new lin involvement: inc tumor burden bl lungs, possible new hepatic metastasis, worsening disease of left breast/chest wall, new height loss T2  - c/w calcitonin 4mg/kg for total of 4 doses  - s/p pamidronate 60 mg x 6 hours 4/23  - iPTH 4, low; vit d 43 wnl, TSH 0.8 wnl   - Endo recs appreciated   - Onc recs appreciated   - trend BMP q12 hrs   - f/u PTHrP  - zofran q4 hrs for nausea, likely related to malignancy and hypercalcemia

## 2020-04-24 NOTE — PROGRESS NOTE ADULT - ASSESSMENT
89 yo F with a PMHx of breast cancer s/p lumpectomy and hormone therapy, T2DM, HTN presents to the ED with complaints of weakness, loss of appetite, cough, SOB, and diarrhea, found to have tachycardia, hypoxia, leukocytosis, and b/l pleural infiltrates with L sided pleural effusion, concerning for COVID with ?superimposed PNA, CT chest showing worsening bl metastatic disease of the lungs, large L pleural effusion w/ complete collapse of left lung; course c/b UTI and hypercalcemia likely in the setting of malignancy 89 yo F with a PMHx of breast cancer s/p lumpectomy and hormone therapy, T2DM, HTN presents to the ED with complaints of weakness, loss of appetite, cough, SOB, and diarrhea, found to have tachycardia, hypoxia, leukocytosis, and b/l pleural infiltrates with L sided pleural effusion, concerning for possible COVID with ?superimposed PNA, CT chest showing worsening bl metastatic disease of the lungs, large L pleural effusion w/ complete collapse of left lung, now Covid neg x 3; course c/b UTI and hypercalcemia likely in the setting of malignancy and persistent hyperkalemia, started on daily lokelma (4/24-)

## 2020-04-24 NOTE — PROGRESS NOTE ADULT - PROBLEM SELECTOR PLAN 1
hypercalcemia corrected to 13.8 and symptomatic with constipation and AMS. Non-PTH mediated hypercalcemia, suspect malignancy given hx of breast ca  with mets to lung and new onset pleural fluid.   Low PTH with 25 vitamin D and 1,25 vitamin D in high normal range.    calcitonin and pamidronate started 4/23. Xgeva was not approved by pharmacy according to primary team.   -f/u PTHrP pending   -q12 CMP   -IVF currently on hold given large pleural effusion.

## 2020-04-24 NOTE — PROGRESS NOTE ADULT - PROBLEM SELECTOR PLAN 1
Meeting SIRS criteria on presentation 2/2 sig leukocytosis w/ bandemia, tachycardia and tachypnea w/ lactic acidosis at 3.2, all concerning for sepsis 2/2 possible PNA vs. UTI given UA+ for 50 WBC, large LE, many bacteria. other ddx include worsening malignancy with inc metastatic burden   - wbc uptrending patient remains afebrile  - UCx >100,000 GNR   - ID recs appreciated   - c/w cefepime 1g q12 (per ID recommendations) dosed renally given decreased GFR  - f/u repeat lactate

## 2020-04-24 NOTE — PROGRESS NOTE ADULT - ATTENDING COMMENTS
89 yo F with a PMHx of breast cancer s/p lumpectomy and hormone therapy, T2DM, HTN presents to the ED with complaints of weakness, loss of appetite, cough, SOB, and diarrhea, found to have tachycardia, hypoxia, leukocytosis, and b/l pleural infiltrates with L sided pleural effusion, concerning for possible COVID with ?superimposed PNA, CT chest showing worsening bl metastatic disease of the lungs, large L pleural effusion w/ complete collapse of left lung, now Covid neg x 3; course c/b UTI and hypercalcemia likely in the setting of malignancy and persistent hyperkalemia, started on daily lokelma (4/24-)    pt to be moved to non covid unit. Will be transferring care and teams today  I discussed pts clinical status with the pts oncloglist Dr. Post who knows the pt well.   We will move towards home hospice but in the interim will assess drainage of the pleural effusion to help make the pt comfortable, this will either entail a drainge/vs pleurex based on pt and family preference.  Regarding her hyperkalemia and CKD, pt is not a candidate for dialysis and eventually will need to discuss frequency of labs draws if this will not  given the GOC.  for now can monitor q12 to assess hyperK and hypercalcemia trend. pt is s/p pamindronate and finishing calcitonin, hyperCa all secondary to hypercalcemia of malignancy and POD  DM- continue current regimen   appreciate palliative care, oncology, and endocrine care for coordiation of care

## 2020-04-25 LAB
ANION GAP SERPL CALC-SCNC: 21 MMOL/L — HIGH (ref 5–17)
BUN SERPL-MCNC: 68 MG/DL — HIGH (ref 7–23)
CALCIUM SERPL-MCNC: 12.6 MG/DL — HIGH (ref 8.4–10.5)
CHLORIDE SERPL-SCNC: 96 MMOL/L — SIGNIFICANT CHANGE UP (ref 96–108)
CO2 SERPL-SCNC: 19 MMOL/L — LOW (ref 22–31)
CREAT SERPL-MCNC: 2.7 MG/DL — HIGH (ref 0.5–1.3)
GLUCOSE BLDC GLUCOMTR-MCNC: 110 MG/DL — HIGH (ref 70–99)
GLUCOSE BLDC GLUCOMTR-MCNC: 122 MG/DL — HIGH (ref 70–99)
GLUCOSE BLDC GLUCOMTR-MCNC: 124 MG/DL — HIGH (ref 70–99)
GLUCOSE BLDC GLUCOMTR-MCNC: 141 MG/DL — HIGH (ref 70–99)
GLUCOSE SERPL-MCNC: 124 MG/DL — HIGH (ref 70–99)
POTASSIUM SERPL-MCNC: 5.1 MMOL/L — SIGNIFICANT CHANGE UP (ref 3.5–5.3)
POTASSIUM SERPL-SCNC: 5.1 MMOL/L — SIGNIFICANT CHANGE UP (ref 3.5–5.3)
SODIUM SERPL-SCNC: 136 MMOL/L — SIGNIFICANT CHANGE UP (ref 135–145)

## 2020-04-25 PROCEDURE — 99233 SBSQ HOSP IP/OBS HIGH 50: CPT

## 2020-04-25 PROCEDURE — 99255 IP/OBS CONSLTJ NEW/EST HI 80: CPT

## 2020-04-25 PROCEDURE — 99232 SBSQ HOSP IP/OBS MODERATE 35: CPT

## 2020-04-25 RX ADMIN — HEPARIN SODIUM 5000 UNIT(S): 5000 INJECTION INTRAVENOUS; SUBCUTANEOUS at 17:03

## 2020-04-25 RX ADMIN — SODIUM ZIRCONIUM CYCLOSILICATE 10 GRAM(S): 10 POWDER, FOR SUSPENSION ORAL at 11:37

## 2020-04-25 RX ADMIN — ONDANSETRON 4 MILLIGRAM(S): 8 TABLET, FILM COATED ORAL at 12:49

## 2020-04-25 RX ADMIN — HEPARIN SODIUM 5000 UNIT(S): 5000 INJECTION INTRAVENOUS; SUBCUTANEOUS at 06:54

## 2020-04-25 RX ADMIN — DONEPEZIL HYDROCHLORIDE 10 MILLIGRAM(S): 10 TABLET, FILM COATED ORAL at 22:32

## 2020-04-25 RX ADMIN — CEFEPIME 100 MILLIGRAM(S): 1 INJECTION, POWDER, FOR SOLUTION INTRAMUSCULAR; INTRAVENOUS at 06:53

## 2020-04-25 RX ADMIN — SIMVASTATIN 20 MILLIGRAM(S): 20 TABLET, FILM COATED ORAL at 22:32

## 2020-04-25 RX ADMIN — CALCITONIN SALMON 280 INTERNATIONAL UNIT(S): 200 INJECTION, SOLUTION INTRAMUSCULAR at 06:52

## 2020-04-25 RX ADMIN — CEFEPIME 100 MILLIGRAM(S): 1 INJECTION, POWDER, FOR SOLUTION INTRAMUSCULAR; INTRAVENOUS at 17:03

## 2020-04-25 RX ADMIN — INSULIN GLARGINE 30 UNIT(S): 100 INJECTION, SOLUTION SUBCUTANEOUS at 22:30

## 2020-04-25 RX ADMIN — Medication 81 MILLIGRAM(S): at 12:47

## 2020-04-25 NOTE — PROGRESS NOTE ADULT - ASSESSMENT
89 yo F with a PMHx of breast cancer s/p lumpectomy and hormone therapy, T2DM, HTN presents to the ED with complaints of weakness, loss of appetite, cough, SOB, and diarrhea, found to have tachycardia, hypoxia, leukocytosis, and b/l pleural infiltrates with L sided pleural effusion, concerning for possible COVID with ?superimposed PNA, CT chest showing worsening bl metastatic disease of the lungs, large L pleural effusion w/ complete collapse of left lung, now Covid neg x 3; course c/b UTI on abx and hypercalcemia likely in the setting of malignancy and persistent hyperkalemia, started on daily lokelma (4/24-)  GOC ongoing with plans for home hospice.  currently undergoing discussion for likely pleurex for pt comfort given large pleural effusion       #breast cancer with POD  Patient now DNR/DNI, with goal of home w/ home hospice  - Hospice referral provided 4/24  - as per family, would want IV abx and palliative drainage of pleural effusion  -appreciate oncology and palliative care consults   - case has been d/w Dr. Post outpt oncologist  - d/w palliative care GOC: regarding blood draws and FSBG for pt comfort     #SIRS/UTI  Meeting SIRS criteria on presentation 2/2 sig leukocytosis w/ bandemia, tachycardia and tachypnea w/ lactic acidosis at 3.2, all concerning for sepsis 2/2 possible UTI given UA+ for 50 WBC, large LE, many bacteria. other ddx include worsening malignancy with inc metastatic burden   - wbc uptrending patient remains afebrile- all likely secondary to breast cancer POD   - UCx >100,000 GNR   - ID recs appreciated   - c/w cefepime 1g q12 (per ID recommendations) dosed renally given decreased GFR--> will need to discuss with ID course and duration and if able to change to PO     #pleural effusuion   L sided pleural effusion likely metastatic in nature. CT chest showing complete collapse of left lung with large pleural effusion   - c/w cefepime 1g q12 hours for possible bacterial superinfection   - appreciate pulm reccs; pt will need pleurex, will need to consult CT surgery for this - ideally this needs to be done prior to dc for pt comfort for breathing       #hypercalcemia  Ca elevated to 12.5, concern for HyperCA of malignancy given hx of breast cancer and worsening metastatic disease on CTCAP w/ new lin involvement: inc tumor burden bl lungs, possible new hepatic metastasis, worsening disease of left breast/chest wall, new height loss T2  hypercalcemia will likely not resolve given underlaying malignancy and bone mets.   -unable to give IVF given pulm status and large pleural effusion, encourage PO intake   - s/p calcitonin 4mg/kg for total of 4 doses  - s/p pamidronate 60 mg x 6 hours 4/23  - iPTH 4, low; vit d 43 wnl, TSH 0.8 wnl   - f/u PTHrP    #HTN  pt with labile BPs. at times elevated goal <140/80  - holding home lasix, hydralazine and metoprolol succinate   - will introduce home antihypertensives as needed    #electrolyte abnl  Patient w/ evidence of CKD, Bl Cr 2.2-2.5. Presented w/ lactate 3.2, likely in setting of acute infection. Now persistently hyperkalemic w/ elevated Mg, uptrending lactate   - daily lokelma 10mg for persistent hyperkalemia    - patient likely with impending renal failure; will forgo dialysis at this time as family would like to make patient hospice care   - CTM BMP daily (but would not want to check labs idealyl given the GOD, will need to discuss with palliative care regarding : blood draws as it will not change acute management   - CTM UOP closely    #type 2 DM  - A1C elevated at 10, no need for aggressive control in the 89 years old   - c/w 30 U lantus with 4 units premeal  - moderate ISS  -ideally I would like decrease FBSG based on GOC and comfort, await palliative care reccs regarding: goals of labs/FSBG/interventions         #DVT ppx  hep subq

## 2020-04-25 NOTE — PROGRESS NOTE ADULT - ASSESSMENT
89 yo F with Pmhx of breast cancer s/p lumpectomy and hormone therapy, DMII, HTN presents to the ED with complaints of weakness, loss of appetite, cough, SOB, and diarrhea, found to have tachycardia, hypoxia, leukocytosis, and b/l pleural opacities with L sided pleural effusion, (DOUBT COVID with 2 negative tests) and superimposed PNA and UTI.   *****************  4/25-no changes over all

## 2020-04-25 NOTE — PROVIDER CONTACT NOTE (OTHER) - ACTION/TREATMENT ORDERED:
provider notified. no further orders.
NP notified & suggested to do rectal temperature.
No further interventions at this point, will continue to monitor.

## 2020-04-25 NOTE — PROGRESS NOTE ADULT - ATTENDING COMMENTS
Diagnosis Codes:   J96.01 Acute respiratory failure with hypoxia;   Type 2 DM with long term use of insulin   goals of care    CPT Code: Moderate Complexity 76207

## 2020-04-25 NOTE — PROGRESS NOTE ADULT - SUBJECTIVE AND OBJECTIVE BOX
CHIEF COMPLAINT: f/up sob, abnormal CT-nodules and effusion left c/w malignant breast CA--no changes-remains sob    Interval Events: palliative care    REVIEW OF SYSTEMS:  Constitutional: No fevers or chills. No weight loss. + fatigue or generalized malaise.  Eyes: No itching or discharge from the eyes  ENT: No ear pain. No ear discharge. No nasal congestion. No post nasal drip. No epistaxis. No throat pain. No sore throat. No difficulty swallowing.   CV: No chest pain. No palpitations. No lightheadedness or dizziness.   Resp: No dyspnea at rest. + dyspnea on exertion. No orthopnea. No wheezing. No cough. No stridor. No sputum production. No chest pain with respiration.  GI: No nausea. No vomiting. No diarrhea.  MSK: No joint pain or pain in any extremities  Integumentary: No skin lesions. No pedal edema.  Neurological: + gross motor weakness. No sensory changes.  [+ ] All other systems negative  [ ] Unable to assess ROS because ________    OBJECTIVE:  ICU Vital Signs Last 24 Hrs  T(C): 36.7 (24 Apr 2020 19:48), Max: 36.7 (24 Apr 2020 19:48)  T(F): 98 (24 Apr 2020 19:48), Max: 98 (24 Apr 2020 19:48)  HR: 109 (24 Apr 2020 19:48) (94 - 119)  BP: 126/76 (24 Apr 2020 19:48) (124/66 - 137/76)  BP(mean): --  ABP: --  ABP(mean): --  RR: 20 (24 Apr 2020 19:48) (18 - 20)  SpO2: 94% (24 Apr 2020 19:48) (94% - 97%)        04-24 @ 07:01  -  04-25 @ 07:00  --------------------------------------------------------  IN: 330 mL / OUT: 300 mL / NET: 30 mL      CAPILLARY BLOOD GLUCOSE      POCT Blood Glucose.: 166 mg/dL (24 Apr 2020 22:17)      PHYSICAL EXAM: NAD in bed  General: Awake, alert, oriented X 3.   HEENT: Atraumatic, normocephalic.                 Mallampatti Grade 3                No nasal congestion.                No tonsillar or pharyngeal exudates.  Lymph Nodes: No palpable lymphadenopathy  Neck: No JVD. No carotid bruit.   Respiratory: abnormal chest expansion-reduce BS left side                         Normal percussion                         Normal and equal air entry                         No wheeze, rhonchi or rales.  Cardiovascular: S1 S2 normal. No murmurs, rubs or gallops.   Abdomen: Soft, non-tender, non-distended. No organomegaly. Normoactive bowel sounds.  Extremities: Warm to touch. Peripheral pulse palpable. No pedal edema.   Skin: No rashes or skin lesions  Neurological: Motor and sensory examination equal and normal in all four extremities.  Psychiatry: Appropriate mood and affect.    HOSPITAL MEDICATIONS:  MEDICATIONS  (STANDING):  aspirin  chewable 81 milliGRAM(s) Oral daily  cefepime   IVPB 1000 milliGRAM(s) IV Intermittent every 12 hours  cefepime   IVPB      dextrose 5%. 1000 milliLiter(s) (50 mL/Hr) IV Continuous <Continuous>  dextrose 50% Injectable 12.5 Gram(s) IV Push once  dextrose 50% Injectable 25 Gram(s) IV Push once  dextrose 50% Injectable 25 Gram(s) IV Push once  donepezil 10 milliGRAM(s) Oral at bedtime  heparin   Injectable 5000 Unit(s) SubCutaneous every 12 hours  insulin glargine Injectable (LANTUS) 30 Unit(s) SubCutaneous at bedtime  insulin lispro (HumaLOG) corrective regimen sliding scale   SubCutaneous three times a day before meals  simvastatin 20 milliGRAM(s) Oral at bedtime  sodium zirconium cyclosilicate 10 Gram(s) Oral daily    MEDICATIONS  (PRN):  dextrose 40% Gel 15 Gram(s) Oral once PRN Blood Glucose LESS THAN 70 milliGRAM(s)/deciliter  glucagon  Injectable 1 milliGRAM(s) IntraMuscular once PRN Glucose LESS THAN 70 milligrams/deciliter  ondansetron Injectable 4 milliGRAM(s) IV Push every 6 hours PRN Nausea and/or Vomiting      LABS:                        14.7   34.56 )-----------( 350      ( 24 Apr 2020 08:21 )             47.0     04-25    136  |  96  |  68<H>  ----------------------------<  124<H>  5.1   |  19<L>  |  2.70<H>    Ca    12.6<H>      25 Apr 2020 06:13  Phos  3.8     04-24  Mg     2.7     04-24    TPro  6.9  /  Alb  3.1<L>  /  TBili  0.4  /  DBili  x   /  AST  23  /  ALT  14  /  AlkPhos  66  04-23    PT/INR - ( 24 Apr 2020 09:46 )   PT: 12.0 sec;   INR: 1.04 ratio                   MICROBIOLOGY:     RADIOLOGY:  [ ] Reviewed and interpreted by me    Point of Care Ultrasound Findings:    PFT:    EKG:

## 2020-04-25 NOTE — PROGRESS NOTE ADULT - SUBJECTIVE AND OBJECTIVE BOX
Laura Mendes MD  Pager  [phone]  [phone] (nights and weekends)    SUBJECTIVE:  pt states she feels weak  wants to go home   awaiting reccs regarding pleurex       MEDICATIONS  (STANDING):  aspirin  chewable 81 milliGRAM(s) Oral daily  cefepime   IVPB 1000 milliGRAM(s) IV Intermittent every 12 hours  cefepime   IVPB      dextrose 5%. 1000 milliLiter(s) (50 mL/Hr) IV Continuous <Continuous>  dextrose 50% Injectable 12.5 Gram(s) IV Push once  dextrose 50% Injectable 25 Gram(s) IV Push once  dextrose 50% Injectable 25 Gram(s) IV Push once  donepezil 10 milliGRAM(s) Oral at bedtime  heparin   Injectable 5000 Unit(s) SubCutaneous every 12 hours  insulin glargine Injectable (LANTUS) 30 Unit(s) SubCutaneous at bedtime  insulin lispro (HumaLOG) corrective regimen sliding scale   SubCutaneous three times a day before meals  simvastatin 20 milliGRAM(s) Oral at bedtime  sodium zirconium cyclosilicate 10 Gram(s) Oral daily    MEDICATIONS  (PRN):  dextrose 40% Gel 15 Gram(s) Oral once PRN Blood Glucose LESS THAN 70 milliGRAM(s)/deciliter  glucagon  Injectable 1 milliGRAM(s) IntraMuscular once PRN Glucose LESS THAN 70 milligrams/deciliter  ondansetron Injectable 4 milliGRAM(s) IV Push every 6 hours PRN Nausea and/or Vomiting      Vital Signs Last 24 Hrs  T(C): 36.7 (24 Apr 2020 19:48), Max: 36.7 (24 Apr 2020 19:48)  T(F): 98 (24 Apr 2020 19:48), Max: 98 (24 Apr 2020 19:48)  HR: 109 (24 Apr 2020 19:48) (94 - 109)  BP: 126/76 (24 Apr 2020 19:48) (124/66 - 129/65)  BP(mean): --  RR: 20 (24 Apr 2020 19:48) (18 - 20)  SpO2: 94% (24 Apr 2020 19:48) (94% - 95%)    CAPILLARY BLOOD GLUCOSE      POCT Blood Glucose.: 141 mg/dL (25 Apr 2020 12:15)  POCT Blood Glucose.: 110 mg/dL (25 Apr 2020 08:36)  POCT Blood Glucose.: 166 mg/dL (24 Apr 2020 22:17)  POCT Blood Glucose.: 178 mg/dL (24 Apr 2020 17:22)    I&O's Summary    24 Apr 2020 07:01  -  25 Apr 2020 07:00  --------------------------------------------------------  IN: 570 mL / OUT: 500 mL / NET: 70 mL    25 Apr 2020 07:01  -  25 Apr 2020 13:02  --------------------------------------------------------  IN: 240 mL / OUT: 0 mL / NET: 240 mL        PHYSICAL EXAM:  GENERAL: Looks stated age, NAD  CARDIOVASCULAR: Normal S1, S2  PULMONARY: Lungs clear to auscultation bilaterally. No wheezes/rales/rhonchi  GI: Abdomen non-distended, soft, Nontender.  Bowel sounds present  MSK/Ext:  No leg edema.  No calf tenderness bilaterally  PSYCH: Normal Affect.  oriented to person, not to time or place    LABS:                        14.7   34.56 )-----------( 350      ( 24 Apr 2020 08:21 )             47.0     04-25    136  |  96  |  68<H>  ----------------------------<  124<H>  5.1   |  19<L>  |  2.70<H>    Ca    12.6<H>      25 Apr 2020 06:13  Phos  3.8     04-24  Mg     2.7     04-24      PT/INR - ( 24 Apr 2020 09:46 )   PT: 12.0 sec;   INR: 1.04 ratio                     RADIOLOGY & ADDITIONAL TESTS:

## 2020-04-25 NOTE — CHART NOTE - NSCHARTNOTEFT_GEN_A_CORE
Team called to make recommendations prior to discharge  Consider transitioning zofran to ODT 4mg q6H prn      In the event of newly developing, evolving, or worsening symptoms, please contact the Palliative Medicine team via pager (if the patient is at [facility] #[phone] or if the patient is at [facility] #[phone]) The Geriatric and Palliative Medicine service has coverage 24 hours a day/ 7 days a week to provide medical recommendations regarding symptom management needs via telephone

## 2020-04-25 NOTE — CONSULT NOTE ADULT - PROBLEM SELECTOR RECOMMENDATION 9
- pt seen and examined with Dr. Azevedo at bedside  - Extensive discussion with patient's HCP, Danelle via telephone regarding risks and benefits of pleurx catheter  - case has been d/w Dr. Donny Sadler outpt oncologist  - Patient now DNR/DNI, patient's family requesting discharge home w/ home hospice and refusing pleurx placement for drainage of pleural effusion at this time - pt seen and examined with Dr. Azevedo at bedside - patient was fairly comfortable at rest with no acute respiratory distress on nasal cannula for supplemental O2.  - Extensive long discussion with patient's HCP, Danelle via telephone regarding risks and benefits of pleurx catheter including risk of infection, pain, technical difficulty of placement of catheter due to size of tumor and tumor growth vs benefit of drainage.  - case has also been d/w Dr. Donny Sadler patient's outpt oncologist  - Patient is DNR/DNI, patient's family requesting to have pt discharge home w/ home hospice and refusing pleurx placement for drainage of pleural effusion at this time.  - Thoracic surgery will sign off, please reconsult as needed  - Continue care as per primary team. - pt seen and examined with Dr. Azevedo at bedside - patient was fairly comfortable at rest with no acute respiratory distress on nasal cannula for supplemental O2.  - Extensive long discussion with patient's HCP, Danelle via telephone regarding risks and benefits of pleurx catheter including risk of infection, pain, technical difficulty of catheter placement due to size of tumor and continued tumor growth vs benefit of drainage.  - case has also been d/w patient's outpt oncologist, Dr. Donny Sadler   - Patient is DNR/DNI, patient's family requesting to have pt discharge home w/ home hospice and refusing pleurx placement for drainage of pleural effusion at this time.  - Thoracic surgery will sign off, please reconsult as needed  - Continue care as per primary team. - pt seen and examined with Dr. Azevedo at bedside - patient was fairly comfortable at rest with no acute respiratory distress on nasal cannula for supplemental O2.  - Extensive long discussion with patient's HCP, Danelle via telephone regarding risks and benefits of pleurx catheter including risk of infection, pain, technical difficulty of catheter placement due to size of tumor and continued tumor growth vs benefit of drainage.  - Patient is DNR/DNI, patient's family requesting to have pt discharge home w/ home hospice and refusing pleurx placement for drainage of pleural effusion at this time.  - case has also been d/w patient's outpt oncologist, Dr. Donny Sadler who is in agreement with plan.  - Thoracic surgery will sign off, please reconsult as needed  - Continue care as per primary team.

## 2020-04-25 NOTE — PROGRESS NOTE ADULT - ATTENDING COMMENTS
as above-  multifactorial dyspnea-sepsis (UTI-? PNA), malignant effusion left sided, debility---O2 NC sat above 90%  malignant breast CA-metastatic----would strongly consider pleurx cath (CTS evaln for this)  UTI/?PNA--continue ABX-cefepime to complete rx as per ID  oncology--progressive Dz--as per onc                    Agree w/ palliative care f/up  DVT prophylaxis     GI prophylaxis             comfort care    Gumaro Ramos MD-Pulmonary   [phone]

## 2020-04-25 NOTE — CONSULT NOTE ADULT - SUBJECTIVE AND OBJECTIVE BOX
History of Present Illness:  89 yo F with Pmhx of breast cancer s/p lumpectomy and hormone therapy, DMII, HTN presents to the ED with complaints of weakness, loss of appetite, cough, SOB, and diarrhea. Patient has been having the symptoms for the last 4 weeks. She has been having decreased PO intake. She also has been becoming progressively weak and as a result, patient was sent to the ED. She has no known COVID exposure. She was recently diagnosed with UTI and was treated with abx for 7 days. His oncologist obtained labs as outpatient and found that she had elevated WBC.   In the vitals were: Tmax 98.7 F, BP 90s, -150s/80-90s mmHg, RR 20, SO2 99 on NC 2L. Her labs were significant for leukocytosis, lymphopenia, bandemia, thrombocytosis, elevated AG, hyperglycemia, lactic acidosis, hypercalcemia, hypercalcemia. UA showed large LE, nitrate, and bacteria. (22 Apr 2020 16:38)       Past Medical History  Kidney disease: pt states, has 30 % kidney function - denies having nephrologist. Pt reports elevated K+ last year, normal level maintained with diet restrictions  H/O gastroesophageal reflux (GERD)  Malignant hypertension w/o heart disease, w/o chronic kidney disease: pt states, 30 % kidney function - NO nephrologist  Hyperlipidemia  DM (diabetes mellitus): type 2  Hypertension      Past Surgical History  H/O lumpectomy: left breast 5/2019  S/P cholecystectomy: 2016  S/P hysterectomy: at age 40      MEDICATIONS  (STANDING):  aspirin  chewable 81 milliGRAM(s) Oral daily  cefepime   IVPB 1000 milliGRAM(s) IV Intermittent every 12 hours  donepezil 10 milliGRAM(s) Oral at bedtime  heparin   Injectable 5000 Unit(s) SubCutaneous every 12 hours  insulin glargine Injectable (LANTUS) 30 Unit(s) SubCutaneous at bedtime  insulin lispro (HumaLOG) corrective regimen sliding scale   SubCutaneous three times a day before meals  simvastatin 20 milliGRAM(s) Oral at bedtime  sodium zirconium cyclosilicate 10 Gram(s) Oral daily      Vital Signs Last 24 Hrs  T(C): 36.3 (04-25-20 @ 14:50), Max: 36.7 (04-24-20 @ 19:48)  T(F): 97.3 (04-25-20 @ 14:50), Max: 98 (04-24-20 @ 19:48)  HR: 101 (04-25-20 @ 14:50) (96 - 109)  BP: 153/73 (04-25-20 @ 14:50) (124/66 - 153/73)  RR: 20 (04-25-20 @ 14:50) (18 - 20)  SpO2: 94% (04-25-20 @ 14:50) (94% - 95%)             Height (cm): 160.02 (04 Sep 2019 13:30)  Weight (kg): 69.1 (23 Apr 2020 13:57)  BMI (kg/m2): 27 (23 Apr 2020 13:57)      Allergies: No Known Allergies        FAMILY HISTORY:  FH: breast cancer: granddaughter  FH: diabetes mellitus: sister  Family history of rectal cancer: brother      Review of Systems  GENERAL: no fevers or chills   +generalized weakness and fatigue  NEURO: no dizziness, numbness, or tingling   SKIN: no itching, burning, rashes, or lesions   HEENT: no visual changes;  no headache, no vertigo, no recent colds  RESPIRATORY: no shortness of breath, no cough, sputum, wheezing  CARDIOVASCULAR:  no chest pain,  or palpitations  GI: no abd pain. no N/V/D.  PERIPHERAL VASCULAR: no swelling, no tenderness, no erythema      PHYSICAL EXAM  Neurology: A&Ox3, nonfocal, no gross deficits  CV : RRR+S1S2  Lungs: Respirations non-labored, B/L BS diminished  +Left breast mass  Abdomen: Soft, NT/ND, +BSx4Q  Extremities: B/L LE no edema, +PP                                                      LABS:                        14.7   34.56 )-----------( 350      ( 24 Apr 2020 08:21 )             47.0       136  |  96  |  68<H>  ----------------------------<  124<H>  5.1   |  19<L>  |  2.70<H>      PT/INR - ( 24 Apr 2020 09:46 )   PT: 12.0 sec;   INR: 1.04 ratio         < from: CT Chest No Cont (04.23.20 @ 19:35) >  FINDINGS:    CHEST:     LUNGS AND LARGE AIRWAYS: Patent central airways. There is however severe new collapse of the left lung  New and enlarging pulmonary nodules of aerated right lung compared withprior study. For reference:   A right lower lobe nodule (2:61) currently measures 2.1 cm where previously measured 0.8 cm.  A right upper lobe posterior segment nodule (2:29) currently measures 1.3 cm which previously measured 0.4 cm.    PLEURA: Large new left pleural effusion with new pleural nodularity identified most compatible with metastatic disease involving the left pleural space. For reference there is a left posterior pleural nodule measuring 2.2 x 1.3 cm (2:29).  VESSELS: Within normal limits.  HEART: Heart size is normal. No pericardial effusion.  MEDIASTINUM AND DOMENICA: Left supraclavicular node new measuring 1.8 cm (2:1) which is incompletely imaged.  CHEST WALL AND LOWER NECK: Mild to moderate left axillary adenopathy previously described including 2 nodes measuring 2.5 x 1.8 cm and 2.5 x 1.6 cm now are incorporated into a confluent left axillary mass measuring approximately 9.6 x 5.7 cm (2:29). Increasing addition nodes are present. Skin and subcutaneous thickening of the left breast and left chest wall likely reflecting contiguous neoplastic involvement.    ABDOMEN AND PELVIS:    LIVER: New 1.3 cm nodule at the hepatic dome highly suspicious for a developing metastatic focus (2:61).  BILE DUCTS: Stable dilatation of the common bile duct likely due to patient's postcholecystectomy state.  GALLBLADDER: Status post cholecystectomy.  SPLEEN: Within normal limits.  PANCREAS: Within normal limits.  ADRENALS: Within normal limits.  KIDNEYS/URETERS: Indeterminant 1 cm noduleof the right kidney midpole unchanged in size compared with prior study.  Small right lower pole cyst. No evidence of renal calculus or hydronephrosis.  BLADDER: Within normal limits.  REPRODUCTIVE ORGANS: Hysterectomy.    BOWEL: No bowel obstruction. Appendix is normal.  PERITONEUM: No ascites.  VESSELS: Moderate atheromatous of the aorta and branch vessels..  RETROPERITONEUM/LYMPH NODES: No lymphadenopathy.    ABDOMINAL WALL: Within normal limits.  BONES: Moderate degenerative changes of spine. New moderate loss of height of T12 vertebral body when compared to the patient's previous examination.    IMPRESSION:     Increase in size and number of right lung metastases.  Collapse of the left lung with a large pleural effusion. 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  Developing hepatic metastatic focus is suspected.  Increasing metastatic oleg mass of the left axillary region and left supraclavicular region. Skin and subcutaneous thickening of the left chest wall and left breast  New loss of height of T12 vertebral body compared with prior scan. History of Present Illness:  89 y/o Female with Pmhx of breast cancer s/p lumpectomy and hormone therapy, DMII, HTN presents to the ED with complaints of weakness, loss of appetite, cough, SOB, and diarrhea. Patient has been having the symptoms for the last 4 weeks. She has been having decreased PO intake. She also has been becoming progressively weak and as a result, patient was sent to the ED. She has no known COVID exposure. She was recently diagnosed with UTI and was treated with abx for 7 days. His oncologist obtained labs as outpatient and found that she had elevated WBC.   In the vitals were: Tmax 98.7 F, BP 90s, -150s/80-90s mmHg, RR 20, SO2 99 on NC 2L. Her labs were significant for leukocytosis, lymphopenia, bandemia, thrombocytosis, elevated AG, hyperglycemia, lactic acidosis, hypercalcemia, hypercalcemia. UA showed large LE, nitrate, and bacteria. (22 Apr 2020 16:38)       Past Medical History  Kidney disease: pt states, has 30 % kidney function - denies having nephrologist. Pt reports elevated K+ last year, normal level maintained with diet restrictions  H/O gastroesophageal reflux (GERD)  Malignant hypertension w/o heart disease, w/o chronic kidney disease: pt states, 30 % kidney function - NO nephrologist  Hyperlipidemia  DM (diabetes mellitus): type 2  Hypertension      Past Surgical History  H/O lumpectomy: left breast 5/2019  S/P cholecystectomy: 2016  S/P hysterectomy: at age 40      MEDICATIONS  (STANDING):  aspirin  chewable 81 milliGRAM(s) Oral daily  cefepime   IVPB 1000 milliGRAM(s) IV Intermittent every 12 hours  donepezil 10 milliGRAM(s) Oral at bedtime  heparin   Injectable 5000 Unit(s) SubCutaneous every 12 hours  insulin glargine Injectable (LANTUS) 30 Unit(s) SubCutaneous at bedtime  insulin lispro (HumaLOG) corrective regimen sliding scale   SubCutaneous three times a day before meals  simvastatin 20 milliGRAM(s) Oral at bedtime  sodium zirconium cyclosilicate 10 Gram(s) Oral daily      Vital Signs Last 24 Hrs  T(C): 36.3 (04-25-20 @ 14:50), Max: 36.7 (04-24-20 @ 19:48)  T(F): 97.3 (04-25-20 @ 14:50), Max: 98 (04-24-20 @ 19:48)  HR: 101 (04-25-20 @ 14:50) (96 - 109)  BP: 153/73 (04-25-20 @ 14:50) (124/66 - 153/73)  RR: 20 (04-25-20 @ 14:50) (18 - 20)  SpO2: 94% (04-25-20 @ 14:50) (94% - 95%)             Height (cm): 160.02 (04 Sep 2019 13:30)  Weight (kg): 69.1 (23 Apr 2020 13:57)  BMI (kg/m2): 27 (23 Apr 2020 13:57)      Allergies: No Known Allergies        FAMILY HISTORY:  FH: breast cancer: granddaughter  FH: diabetes mellitus: sister  Family history of rectal cancer: brother      Review of Systems  GENERAL: no fevers or chills   +generalized weakness and fatigue  NEURO: no dizziness, numbness, or tingling   SKIN: no itching, burning, rashes, or lesions   HEENT: no visual changes;  no headache, no vertigo, no recent colds  RESPIRATORY: no shortness of breath, no cough, sputum, wheezing  CARDIOVASCULAR:  no chest pain,  or palpitations  GI: no abd pain. no N/V/D.  PERIPHERAL VASCULAR: no swelling, no tenderness, no erythema      PHYSICAL EXAM  Neurology: A&Ox3, nonfocal, no gross deficits  CV : RRR+S1S2  Lungs: Respirations non-labored, B/L BS diminished  +Left breast mass  Abdomen: Soft, NT/ND, +BSx4Q  Extremities: B/L LE no edema, +PP                                                      LABS:                        14.7   34.56 )-----------( 350      ( 24 Apr 2020 08:21 )             47.0       136  |  96  |  68<H>  ----------------------------<  124<H>  5.1   |  19<L>  |  2.70<H>      PT/INR - ( 24 Apr 2020 09:46 )   PT: 12.0 sec;   INR: 1.04 ratio         < from: CT Chest No Cont (04.23.20 @ 19:35) >  FINDINGS:    CHEST:     LUNGS AND LARGE AIRWAYS: Patent central airways. There is however severe new collapse of the left lung  New and enlarging pulmonary nodules of aerated right lung compared withprior study. For reference:   A right lower lobe nodule (2:61) currently measures 2.1 cm where previously measured 0.8 cm.  A right upper lobe posterior segment nodule (2:29) currently measures 1.3 cm which previously measured 0.4 cm.    PLEURA: Large new left pleural effusion with new pleural nodularity identified most compatible with metastatic disease involving the left pleural space. For reference there is a left posterior pleural nodule measuring 2.2 x 1.3 cm (2:29).  VESSELS: Within normal limits.  HEART: Heart size is normal. No pericardial effusion.  MEDIASTINUM AND DOMENICA: Left supraclavicular node new measuring 1.8 cm (2:1) which is incompletely imaged.  CHEST WALL AND LOWER NECK: Mild to moderate left axillary adenopathy previously described including 2 nodes measuring 2.5 x 1.8 cm and 2.5 x 1.6 cm now are incorporated into a confluent left axillary mass measuring approximately 9.6 x 5.7 cm (2:29). Increasing addition nodes are present. Skin and subcutaneous thickening of the left breast and left chest wall likely reflecting contiguous neoplastic involvement.    ABDOMEN AND PELVIS:    LIVER: New 1.3 cm nodule at the hepatic dome highly suspicious for a developing metastatic focus (2:61).  BILE DUCTS: Stable dilatation of the common bile duct likely due to patient's postcholecystectomy state.  GALLBLADDER: Status post cholecystectomy.  SPLEEN: Within normal limits.  PANCREAS: Within normal limits.  ADRENALS: Within normal limits.  KIDNEYS/URETERS: Indeterminant 1 cm noduleof the right kidney midpole unchanged in size compared with prior study.  Small right lower pole cyst. No evidence of renal calculus or hydronephrosis.  BLADDER: Within normal limits.  REPRODUCTIVE ORGANS: Hysterectomy.    BOWEL: No bowel obstruction. Appendix is normal.  PERITONEUM: No ascites.  VESSELS: Moderate atheromatous of the aorta and branch vessels..  RETROPERITONEUM/LYMPH NODES: No lymphadenopathy.    ABDOMINAL WALL: Within normal limits.  BONES: Moderate degenerative changes of spine. New moderate loss of height of T12 vertebral body when compared to the patient's previous examination.    IMPRESSION:     Increase in size and number of right lung metastases.  Collapse of the left lung with a large pleural effusion. 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  Developing hepatic metastatic focus is suspected.  Increasing metastatic oleg mass of the left axillary region and left supraclavicular region. Skin and subcutaneous thickening of the left chest wall and left breast  New loss of height of T12 vertebral body compared with prior scan.

## 2020-04-25 NOTE — CONSULT NOTE ADULT - ASSESSMENT
89 y/o Female with a PMHx of breast cancer s/p lumpectomy and hormone therapy, T2DM, HTN presents to the ED with complaints of weakness, loss of appetite, cough, SOB, and diarrhea, found to have tachycardia, hypoxia, leukocytosis, and b/l pleural infiltrates with L sided pleural effusion, concerning for possible COVID with ?superimposed PNA, CT chest showing worsening bl metastatic disease of the lungs, large L pleural effusion w/ complete collapse of left lung, likely metastatic in nature. Palliative care following - GOC ongoing with plans for home hospice. Thoracic surgery consulted for Pleurx catheter placement for palliative drainage and pt comfort. 89 y/o Female with a PMHx of breast cancer s/p lumpectomy and hormone therapy, T2DM, HTN presents to the ED with complaints of weakness, loss of appetite, cough, SOB, and diarrhea, found to have tachycardia, hypoxia, leukocytosis, and b/l pleural infiltrates with L sided pleural effusion, concerning for possible COVID with ?superimposed PNA, CT chest showing worsening bl metastatic disease of the lungs, large L pleural effusion w/ complete collapse of left lung, likely metastatic in nature. Palliative care following - GOC ongoing with plans for home hospice. Thoracic surgery consulted for Pleurx catheter placement for palliative Regular diet - low fat, low cholesterol, no added salt.rainage and pt comfort.

## 2020-04-25 NOTE — PROVIDER CONTACT NOTE (OTHER) - ASSESSMENT
pt A&Ox3-4 denies pain, weak, poor appetite, on 2L o2 NC in no distress, calm, resting in bed
Pt heartrate 119. Pt denies chest pain or SOB. Other VSS. Pt just had an episode of emesis

## 2020-04-26 ENCOUNTER — TRANSCRIPTION ENCOUNTER (OUTPATIENT)
Age: 85
End: 2020-04-26

## 2020-04-26 VITALS
HEART RATE: 110 BPM | OXYGEN SATURATION: 93 % | DIASTOLIC BLOOD PRESSURE: 79 MMHG | TEMPERATURE: 97 F | RESPIRATION RATE: 18 BRPM | SYSTOLIC BLOOD PRESSURE: 110 MMHG

## 2020-04-26 LAB
GLUCOSE BLDC GLUCOMTR-MCNC: 103 MG/DL — HIGH (ref 70–99)
GLUCOSE BLDC GLUCOMTR-MCNC: 110 MG/DL — HIGH (ref 70–99)

## 2020-04-26 PROCEDURE — 80053 COMPREHEN METABOLIC PANEL: CPT

## 2020-04-26 PROCEDURE — 85379 FIBRIN DEGRADATION QUANT: CPT

## 2020-04-26 PROCEDURE — 84100 ASSAY OF PHOSPHORUS: CPT

## 2020-04-26 PROCEDURE — 82330 ASSAY OF CALCIUM: CPT

## 2020-04-26 PROCEDURE — 87086 URINE CULTURE/COLONY COUNT: CPT

## 2020-04-26 PROCEDURE — 83880 ASSAY OF NATRIURETIC PEPTIDE: CPT

## 2020-04-26 PROCEDURE — 84132 ASSAY OF SERUM POTASSIUM: CPT

## 2020-04-26 PROCEDURE — 74176 CT ABD & PELVIS W/O CONTRAST: CPT

## 2020-04-26 PROCEDURE — 99232 SBSQ HOSP IP/OBS MODERATE 35: CPT

## 2020-04-26 PROCEDURE — 93005 ELECTROCARDIOGRAM TRACING: CPT

## 2020-04-26 PROCEDURE — 87040 BLOOD CULTURE FOR BACTERIA: CPT

## 2020-04-26 PROCEDURE — 84443 ASSAY THYROID STIM HORMONE: CPT

## 2020-04-26 PROCEDURE — 82947 ASSAY GLUCOSE BLOOD QUANT: CPT

## 2020-04-26 PROCEDURE — 99233 SBSQ HOSP IP/OBS HIGH 50: CPT

## 2020-04-26 PROCEDURE — 71045 X-RAY EXAM CHEST 1 VIEW: CPT

## 2020-04-26 PROCEDURE — 83519 RIA NONANTIBODY: CPT

## 2020-04-26 PROCEDURE — 93970 EXTREMITY STUDY: CPT

## 2020-04-26 PROCEDURE — 87635 SARS-COV-2 COVID-19 AMP PRB: CPT

## 2020-04-26 PROCEDURE — 87186 SC STD MICRODIL/AGAR DIL: CPT

## 2020-04-26 PROCEDURE — 82652 VIT D 1 25-DIHYDROXY: CPT

## 2020-04-26 PROCEDURE — 81001 URINALYSIS AUTO W/SCOPE: CPT

## 2020-04-26 PROCEDURE — 82728 ASSAY OF FERRITIN: CPT

## 2020-04-26 PROCEDURE — 80048 BASIC METABOLIC PNL TOTAL CA: CPT

## 2020-04-26 PROCEDURE — 84145 PROCALCITONIN (PCT): CPT

## 2020-04-26 PROCEDURE — 82310 ASSAY OF CALCIUM: CPT

## 2020-04-26 PROCEDURE — 96374 THER/PROPH/DIAG INJ IV PUSH: CPT

## 2020-04-26 PROCEDURE — 82962 GLUCOSE BLOOD TEST: CPT

## 2020-04-26 PROCEDURE — 83036 HEMOGLOBIN GLYCOSYLATED A1C: CPT

## 2020-04-26 PROCEDURE — 83605 ASSAY OF LACTIC ACID: CPT

## 2020-04-26 PROCEDURE — 84295 ASSAY OF SERUM SODIUM: CPT

## 2020-04-26 PROCEDURE — 71250 CT THORAX DX C-: CPT

## 2020-04-26 PROCEDURE — 85610 PROTHROMBIN TIME: CPT

## 2020-04-26 PROCEDURE — 82306 VITAMIN D 25 HYDROXY: CPT

## 2020-04-26 PROCEDURE — 99285 EMERGENCY DEPT VISIT HI MDM: CPT | Mod: 25

## 2020-04-26 PROCEDURE — 82435 ASSAY OF BLOOD CHLORIDE: CPT

## 2020-04-26 PROCEDURE — 86140 C-REACTIVE PROTEIN: CPT

## 2020-04-26 PROCEDURE — 83970 ASSAY OF PARATHORMONE: CPT

## 2020-04-26 PROCEDURE — 82803 BLOOD GASES ANY COMBINATION: CPT

## 2020-04-26 PROCEDURE — 83735 ASSAY OF MAGNESIUM: CPT

## 2020-04-26 PROCEDURE — 85014 HEMATOCRIT: CPT

## 2020-04-26 PROCEDURE — 85027 COMPLETE CBC AUTOMATED: CPT

## 2020-04-26 RX ORDER — FUROSEMIDE 40 MG
1 TABLET ORAL
Qty: 0 | Refills: 0 | DISCHARGE

## 2020-04-26 RX ORDER — INSULIN GLARGINE 100 [IU]/ML
40 INJECTION, SOLUTION SUBCUTANEOUS
Qty: 0 | Refills: 0 | DISCHARGE

## 2020-04-26 RX ORDER — OMEPRAZOLE 10 MG/1
1 CAPSULE, DELAYED RELEASE ORAL
Qty: 0 | Refills: 0 | DISCHARGE

## 2020-04-26 RX ORDER — POLYETHYLENE GLYCOL 3350 17 G/17G
17 POWDER, FOR SOLUTION ORAL
Qty: 0 | Refills: 0 | DISCHARGE

## 2020-04-26 RX ORDER — SIMVASTATIN 20 MG/1
1 TABLET, FILM COATED ORAL
Qty: 0 | Refills: 0 | DISCHARGE

## 2020-04-26 RX ORDER — CEFPODOXIME PROXETIL 100 MG
1 TABLET ORAL
Qty: 6 | Refills: 0
Start: 2020-04-26 | End: 2020-04-28

## 2020-04-26 RX ORDER — ONDANSETRON 8 MG/1
1 TABLET, FILM COATED ORAL
Qty: 42 | Refills: 0
Start: 2020-04-26 | End: 2020-05-09

## 2020-04-26 RX ORDER — ONDANSETRON 8 MG/1
1 TABLET, FILM COATED ORAL
Qty: 15 | Refills: 0
Start: 2020-04-26 | End: 2020-04-30

## 2020-04-26 RX ORDER — AMLODIPINE BESYLATE 2.5 MG/1
1 TABLET ORAL
Qty: 0 | Refills: 0 | DISCHARGE

## 2020-04-26 RX ORDER — INSULIN GLARGINE 100 [IU]/ML
25 INJECTION, SOLUTION SUBCUTANEOUS
Qty: 0 | Refills: 0 | DISCHARGE

## 2020-04-26 RX ORDER — METOPROLOL TARTRATE 50 MG
1 TABLET ORAL
Qty: 0 | Refills: 0 | DISCHARGE

## 2020-04-26 RX ORDER — HYDRALAZINE HCL 50 MG
1 TABLET ORAL
Qty: 0 | Refills: 0 | DISCHARGE

## 2020-04-26 RX ORDER — VITAMIN E 100 UNIT
0 CAPSULE ORAL
Qty: 0 | Refills: 0 | DISCHARGE

## 2020-04-26 RX ORDER — ASPIRIN/CALCIUM CARB/MAGNESIUM 324 MG
1 TABLET ORAL
Qty: 0 | Refills: 0 | DISCHARGE

## 2020-04-26 RX ADMIN — HEPARIN SODIUM 5000 UNIT(S): 5000 INJECTION INTRAVENOUS; SUBCUTANEOUS at 06:28

## 2020-04-26 RX ADMIN — CEFEPIME 100 MILLIGRAM(S): 1 INJECTION, POWDER, FOR SOLUTION INTRAMUSCULAR; INTRAVENOUS at 06:27

## 2020-04-26 RX ADMIN — Medication 81 MILLIGRAM(S): at 12:08

## 2020-04-26 NOTE — PROGRESS NOTE ADULT - PROBLEM SELECTOR PROBLEM 6
Type 2 diabetes mellitus with other specified complication, with long-term current use of insulin
Type 2 diabetes mellitus with other specified complication, with long-term current use of insulin
Essential hypertension
Urinary tract infection without hematuria, site unspecified

## 2020-04-26 NOTE — PROGRESS NOTE ADULT - PROBLEM SELECTOR PROBLEM 3
Pleural effusion
Pleural effusion
Essential hypertension
Hypercalcemia
Hyperkalemia
Pleural effusion

## 2020-04-26 NOTE — DISCHARGE NOTE PROVIDER - NSDCCPCAREPLAN_GEN_ALL_CORE_FT
PRINCIPAL DISCHARGE DIAGNOSIS  Diagnosis: Pleural effusion on left  Assessment and Plan of Treatment: Seen by Thoracic surgery. Comfort care      SECONDARY DISCHARGE DIAGNOSES  Diagnosis: Systemic inflammatory response syndrome (SIRS)  Assessment and Plan of Treatment: Completed course of intravenous antibiotic therapy    Diagnosis: Hypercalcemia  Assessment and Plan of Treatment:     Diagnosis: Hyperkalemia  Assessment and Plan of Treatment: Potassium improved    Diagnosis: Metastatic breast cancer  Assessment and Plan of Treatment:     Diagnosis: Type 2 diabetes mellitus with other specified complication, with long-term current use of insulin  Assessment and Plan of Treatment: HgbA1c- 10. Continue with your home dose of Insulin. PRINCIPAL DISCHARGE DIAGNOSIS  Diagnosis: Pleural effusion on left  Assessment and Plan of Treatment: Seen by Thoracic surgery. Comfort care      SECONDARY DISCHARGE DIAGNOSES  Diagnosis: Systemic inflammatory response syndrome (SIRS)  Assessment and Plan of Treatment: Complete course of oral antibiotic therapy    Diagnosis: Hypercalcemia  Assessment and Plan of Treatment:     Diagnosis: Hyperkalemia  Assessment and Plan of Treatment: Potassium improved    Diagnosis: Metastatic breast cancer  Assessment and Plan of Treatment:     Diagnosis: Type 2 diabetes mellitus with other specified complication, with long-term current use of insulin  Assessment and Plan of Treatment: HgbA1c- 10. Continue with your home dose of Insulin.

## 2020-04-26 NOTE — PROGRESS NOTE ADULT - ASSESSMENT
89 yo F with a PMHx of breast cancer s/p lumpectomy and hormone therapy, T2DM, HTN presents to the ED with complaints of weakness, loss of appetite, cough, SOB, and diarrhea, found to have tachycardia, hypoxia, leukocytosis, and b/l pleural infiltrates with L sided pleural effusion, concerning for possible COVID with ?superimposed PNA, CT chest showing worsening bl metastatic disease of the lungs, large L pleural effusion w/ complete collapse of left lung, now Covid neg x 3; course c/b UTI on abx and hypercalcemia likely in the setting of malignancy and persistent hyperkalemia, started on daily lokelma (4/24-)  GOC ongoing with plans for home hospice with plan for dc today  pt family deferred pleurex in the setting of large pleural effusion      #breast cancer with POD  Patient now DNR/DNI, with goal of home w/ home hospice  - Hospice referral provided 4/24, plan to go home today   -appreciate oncology and palliative care consults   - case has been d/w Dr. Post outpt oncologist  - Plan is for home hospice. Family does not want pluerex for the pt, therefore given goc will dc for home hospice.  Home hospice to decide any further itnerventions and frequency of home meds and vitals.  Home hospice agency not currently in house therefore to be assessed tomorrow.    #SIRS/UTI  Meeting SIRS criteria on presentation 2/2 sig leukocytosis w/ bandemia, tachycardia and tachypnea w/ lactic acidosis at 3.2, all concerning for sepsis 2/2 possible UTI given UA+ for 50 WBC, large LE, many bacteria. other ddx include worsening malignancy with inc metastatic burden   - wbc uptrending patient remains afebrile- all likely secondary to breast cancer POD   - UCx >100,000 GNR   - ID recs appreciated   - c/w cefepime 1g q12 (per ID recommendations) dosed renally given decreased GFR--> will need to discuss with ID course and duration and if able to change to PO     #pleural effusuion   L sided pleural effusion likely metastatic in nature. CT chest showing complete collapse of left lung with large pleural effusion   - c/w cefepime 1g q12 hours for possible bacterial superinfection   - appreciate pulm reccs; pt will need pleurex, will need to consult CT surgery for this - ideally this needs to be done prior to dc for pt comfort for breathing       #hypercalcemia  Ca elevated to 12.5, concern for HyperCA of malignancy given hx of breast cancer and worsening metastatic disease on CTCAP w/ new lin involvement: inc tumor burden bl lungs, possible new hepatic metastasis, worsening disease of left breast/chest wall, new height loss T2  hypercalcemia will likely not resolve given underlaying malignancy and bone mets.   -unable to give IVF given pulm status and large pleural effusion, encourage PO intake   - s/p calcitonin 4mg/kg for total of 4 doses  - s/p pamidronate 60 mg x 6 hours 4/23  - iPTH 4, low; vit d 43 wnl, TSH 0.8 wnl   - f/u PTHrP    #HTN  pt with labile BPs. at times elevated goal <140/80  - holding home lasix, hydralazine and metoprolol succinate   - will introduce home antihypertensives as needed    #electrolyte abnl  Patient w/ evidence of CKD, Bl Cr 2.2-2.5. Presented w/ lactate 3.2, likely in setting of acute infection. Now persistently hyperkalemic w/ elevated Mg, uptrending lactate   - daily lokelma 10mg for persistent hyperkalemia    - patient likely with impending renal failure; will forgo dialysis at this time as family would like to make patient hospice care   - CTM BMP daily (but would not want to check labs idealyl given the GOD, will need to discuss with palliative care regarding : blood draws as it will not change acute management   - CTM UOP closely    #type 2 DM  - A1C elevated at 10, no need for aggressive control in the 89 years old   - c/w 30 U lantus with 4 units premeal  - moderate ISS  -ideally I would like decrease FBSG based on GOC and comfort, await palliative care reccs regarding: goals of labs/FSBG/interventions         #DVT ppx  hep subq 89 yo F with a PMHx of breast cancer s/p lumpectomy and hormone therapy, T2DM, HTN presents to the ED with complaints of weakness, loss of appetite, cough, SOB, and diarrhea, found to have tachycardia, hypoxia, leukocytosis, and b/l pleural infiltrates with L sided pleural effusion, concerning for possible COVID with ?superimposed PNA, CT chest showing worsening bl metastatic disease of the lungs, large L pleural effusion w/ complete collapse of left lung, now Covid neg x 3; course c/b UTI on abx and hypercalcemia likely in the setting of malignancy and persistent hyperkalemia, started on daily lokelma (4/24-)  GOC ongoing with plans for home hospice with plan for dc today  pt family deferred pleurex in the setting of large pleural effusion      #breast cancer with POD  Patient now DNR/DNI, with goal of home w/ home hospice  - Hospice referral provided 4/24, plan to go home today   -appreciate oncology and palliative care consults   - case has been d/w Dr. Post outpt oncologist  - Plan is for home hospice. Family does not want pluerex for the pt, therefore given goc will dc for home hospice.  Home hospice to decide any further itnerventions and frequency of home meds and vitals.  Home hospice agency not currently in house therefore final meds and interventions to be assessed tomorrow.    #SIRS/UTI  Meeting SIRS criteria on presentation 2/2 sig leukocytosis w/ bandemia, tachycardia and tachypnea w/ lactic acidosis at 3.2, all concerning for sepsis 2/2 possible UTI given UA+ for 50 WBC, large LE, many bacteria. other ddx include worsening malignancy with inc metastatic burden   - wbc uptrending patient remains afebrile- all likely secondary to breast cancer POD   - UCx >100,000 GNR   - ID recs appreciated   - c/w cefepime 1g q12 (per ID recommendations) dosed renally given decreased GFR--> will need to discuss with ID course and duration and if able to change to PO for home hospice, likely can change to PO cefpodoxime    #pleural effusuion   L sided pleural effusion likely metastatic in nature. CT chest showing complete collapse of left lung with large pleural effusion   - c/w cefepime 1g q12 hours for possible bacterial superinfection --> than change to PO abx as per ID for home hospice       #hypercalcemia  Ca elevated to 12.5, concern for HyperCA of malignancy given hx of breast cancer and worsening metastatic disease on CTCAP w/ new lin involvement: inc tumor burden bl lungs, possible new hepatic metastasis, worsening disease of left breast/chest wall, new height loss T2  hypercalcemia will likely not resolve given underlaying malignancy and bone mets.   -unable to give IVF given pulm status and large pleural effusion, encourage PO intake   - s/p calcitonin 4mg/kg for total of 4 doses  - s/p pamidronate 60 mg x 6 hours 4/23  - iPTH 4, low; vit d 43 wnl, TSH 0.8 wnl   - f/u PTHrP, though this will not  acutely     #HTN  pt with labile BPs. at times elevated goal <140/80  - holding home lasix, hydralazine and metoprolol succinate   - home hospice can introduce home antihypertensives as needed   can use lasix prn for symptom control     #electrolyte abnl  Patient w/ evidence of CKD, Bl Cr 2.2-2.5. Presented w/ lactate 3.2, likely in setting of acute infection. Now persistently hyperkalemic w/ elevated Mg, uptrending lactate  - as d/w family will hold lab draws today given GOC and that it will not  acutely or long term management  -d/w Dr. Post as well that we will stop checking labs inpt today    - daily lokelma 10mg for persistent hyperkalemia  can hold this for dc   - patient likely with impending renal failure; will forgo dialysis at this time as family would like to make patient hospice care   - CTM UOP closely    #type 2 DM  - A1C elevated at 10, no need for aggressive control in this 89 year old   -decrease to lantus 25 at night, titrate as needed outpt based on FSBG adn PO intake   -low dose ISS  -ideally I would like decrease FBSG based on GOC and comfort, await palliative/hospice care reccs regarding: goals of labs/FSBG/interventions         #DVT ppx  hep subq

## 2020-04-26 NOTE — PROGRESS NOTE ADULT - REASON FOR ADMISSION
Weakness, diarrhea, and cough

## 2020-04-26 NOTE — DISCHARGE NOTE PROVIDER - HOSPITAL COURSE
89 yo F with a PMHx of breast cancer s/p lumpectomy and hormone therapy, T2DM, HTN presents to the ED with complaints of weakness, loss of appetite, cough, SOB, and diarrhea, found to have tachycardia, hypoxia, leukocytosis, and b/l pleural infiltrates with L sided pleural effusion, concerning for possible COVID with ?superimposed PNA, CT chest showing worsening bl metastatic disease of the lungs, large L pleural effusion w/ complete collapse of left lung, now Covid neg x 3; course c/b UTI on abx and hypercalcemia likely in the setting of malignancy and persistent hyperkalemia, started on daily lokelma (4/24-)    GOC ongoing with plans for home hospice.    currently undergoing discussion for likely pleurex for pt comfort given large pleural effusion             #breast cancer with POD    Patient now DNR/DNI, with goal of home w/ home hospice    - as per family, would want IV abx and palliative drainage of pleural effusion    -appreciate oncology and palliative care consults     - case has been d/w Dr. Post outpt oncologist    - d/w palliative care GOC: regarding blood draws and FSBG for pt comfort         #SIRS/UTI    Meeting SIRS criteria on presentation 2/2 sig leukocytosis w/ bandemia, tachycardia and tachypnea w/ lactic acidosis at 3.2, all concerning for sepsis 2/2 possible UTI given UA+ for 50 WBC, large LE, many bacteria. other ddx include worsening malignancy with inc metastatic burden     - wbc uptrending patient remains afebrile- all likely secondary to breast cancer POD     - UCx >100,000 GNR     - ID recs appreciated     - c/w cefepime 1g q12 (per ID recommendations) dosed renally given decreased GFR--> will need to discuss with ID course and duration and if able to change to PO         #pleural effusuion     L sided pleural effusion likely metastatic in nature. CT chest showing complete collapse of left lung with large pleural effusion     - c/w cefepime 1g q12 hours for possible bacterial superinfection     -pt seen by thoracic surgery:     - Extensive long discussion with patient's HCPDanelle via telephone regarding risks and benefits of pleurx catheter including risk of infection, pain, technical difficulty of catheter placement due to size of tumor and continued tumor growth vs benefit of drainage.    - Patient is DNR/DNI, patient's family requesting to have pt discharge home w/ home hospice and refusing pleurx placement for drainage of pleural effusion at this time.    - case has also been d/w patient's outpt oncologist, Dr. Donny Sadler who is in agreement with plan.        #hypercalcemia    Ca elevated to 12.5, concern for HyperCA of malignancy given hx of breast cancer and worsening metastatic disease on CTCAP w/ new lin involvement: inc tumor burden bl lungs, possible new hepatic metastasis, worsening disease of left breast/chest wall, new height loss T2    hypercalcemia will likely not resolve given underlaying malignancy and bone mets.     -unable to give IVF given pulm status and large pleural effusion, encourage PO intake     - s/p calcitonin 4mg/kg for total of 4 doses    - s/p pamidronate 60 mg x 6 hours 4/23            #HTN    pt with labile BPs. at times elevated goal <140/80    - holding home lasix, hydralazine and metoprolol succinate     - introduce home antihypertensives as needed        #electrolyte abnl    Patient w/ evidence of CKD, Bl Cr 2.2-2.5. Presented w/ lactate 3.2, likely in setting of acute infection. Now persistently hyperkalemic w/ elevated Mg, uptrending lactate     - daily lokelma 10mg for persistent hyperkalemia      - patient likely with impending renal failure; will forgo dialysis at this time as family would like to make patient hospice care         #type 2 DM    - A1C elevated at 10, no need for aggressive control in the 89 years old     - c/w 30 U lantus with 4 units premeal    - moderate ISS 89 yo F with a PMHx of breast cancer s/p lumpectomy and hormone therapy, T2DM, HTN presents to the ED with complaints of weakness, loss of appetite, cough, SOB, and diarrhea, found to have tachycardia, hypoxia, leukocytosis, and b/l pleural infiltrates with L sided pleural effusion, concerning for possible COVID with ?superimposed PNA, CT chest showing worsening bl metastatic disease of the lungs, large L pleural effusion w/ complete collapse of left lung, now Covid neg x 3; course c/b UTI on abx and hypercalcemia likely in the setting of malignancy and persistent hyperkalemia, started on daily lokelma (4/24-)    GOC ongoing with plans for home hospice.    currently undergoing discussion for likely pleurex for pt comfort given large pleural effusion             #breast cancer with POD    Patient now DNR/DNI, with goal of home w/ home hospice    - as per family, would want IV abx and palliative drainage of pleural effusion    -appreciate oncology and palliative care consults     - case has been d/w Dr. Post outpt oncologist    - d/w palliative care GOC: regarding blood draws and FSBG for pt comfort         #SIRS/UTI    Meeting SIRS criteria on presentation 2/2 sig leukocytosis w/ bandemia, tachycardia and tachypnea w/ lactic acidosis at 3.2, all concerning for sepsis 2/2 possible UTI given UA+ for 50 WBC, large LE, many bacteria. other ddx include worsening malignancy with inc metastatic burden     - wbc uptrending patient remains afebrile- all likely secondary to breast cancer POD     - UCx >100,000 GNR     - ID recs appreciated     - c/w cefepime 1g q12 (per ID recommendations) dosed renally given decreased GFR--> will need to discuss with ID course and duration and if able to change to PO         #pleural effusuion     L sided pleural effusion likely metastatic in nature. CT chest showing complete collapse of left lung with large pleural effusion     - c/w cefepime 1g q12 hours for possible bacterial superinfection     -pt seen by thoracic surgery:     - Extensive long discussion with patient's HCPDanelle via telephone regarding risks and benefits of pleurx catheter including risk of infection, pain, technical difficulty of catheter placement due to size of tumor and continued tumor growth vs benefit of drainage.    - Patient is DNR/DNI, patient's family requesting to have pt discharge home w/ home hospice and refusing pleurx placement for drainage of pleural effusion at this time.    - case has also been d/w patient's outpt oncologist, Dr. Donny Sadler who is in agreement with plan.        #hypercalcemia    Ca elevated to 12.5, concern for HyperCA of malignancy given hx of breast cancer and worsening metastatic disease on CTCAP w/ new lin involvement: inc tumor burden bl lungs, possible new hepatic metastasis, worsening disease of left breast/chest wall, new height loss T2    hypercalcemia will likely not resolve given underlaying malignancy and bone mets.     -unable to give IVF given pulm status and large pleural effusion, encourage PO intake     - s/p calcitonin 4mg/kg for total of 4 doses    - s/p pamidronate 60 mg x 6 hours 4/23            #HTN    pt with labile BPs. at times elevated goal <140/80    - holding home lasix, hydralazine and metoprolol succinate     - introduce home antihypertensives as needed        #electrolyte abnl    Patient w/ evidence of CKD, Bl Cr 2.2-2.5. Presented w/ lactate 3.2, likely in setting of acute infection. Now persistently hyperkalemic w/ elevated Mg, uptrending lactate     - daily lokelma 10mg for persistent hyperkalemia      - patient likely with impending renal failure; will forgo dialysis at this time as family would like to make patient hospice care         #type 2 DM    - A1C elevated at 10, no need for aggressive control in the 89 years old     - c/w 30 U lantus with 4 units premeal    - moderate ISS        Medically cleared for discharge to home with hospice. Pt has oxygen set-up at home as per  89 yo F with a PMHx of breast cancer s/p lumpectomy and hormone therapy, T2DM, HTN presents to the ED with complaints of weakness, loss of appetite, cough, SOB, and diarrhea, found to have tachycardia, hypoxia, leukocytosis, and b/l pleural infiltrates with L sided pleural effusion, concerning for possible COVID with ?superimposed PNA, CT chest showing worsening bl metastatic disease of the lungs, large L pleural effusion w/ complete collapse of left lung, now Covid neg x 3; course c/b UTI on abx and hypercalcemia likely in the setting of malignancy and persistent hyperkalemia, started on daily lokelma (4/24-)    GOC ongoing with plans for home hospice.    currently undergoing discussion for likely pleurex for pt comfort given large pleural effusion         #pleural effusion    L sided pleural effusion likely metastatic in nature. CT chest showing complete collapse of left lung with large pleural effusion     -pt seen by thoracic surgery:     - Extensive long discussion by thoracic with patient's HCP, regarding risks and benefits of Pleurx catheter including risk of infection, pain, technical difficulty of catheter placement due to size of tumor and continued tumor growth vs benefit of drainage.    - Patient is DNR/DNI, patient's family requesting to have pt discharge home w/ home hospice and refusing pleurx placement for drainage of pleural effusion at this time.    - case has also been d/w patient's outpt oncologist, Dr. Donny Sadler who is in agreement with plan.        #breast cancer with POD    Patient now DNR/DNI, with goal of home w/ home hospice        #SIRS/UTI    Meeting SIRS criteria on presentation 2/2 sig leukocytosis w/ bandemia, tachycardia and tachypnea w/ lactic acidosis at 3.2, all concerning for sepsis 2/2 possible UTI given UA+ for 50 WBC, large LE, many bacteria. other ddx include worsening malignancy with inc metastatic burden     - wbc uptrending patient remains afebrile- all likely secondary to breast cancer POD     - UCx >100,000 GNR - received course of antibiotic therapy        #hypercalcemia    Ca elevated to 12.5, concern for HyperCA of malignancy given hx of breast cancer and worsening metastatic disease on CTCAP w/ new lin involvement: inc tumor burden bl lungs, possible new hepatic metastasis, worsening disease of left breast/chest wall, new height loss T2    hypercalcemia will likely not resolve given underlaying malignancy and bone mets.     -unable to give IVF given pulm status and large pleural effusion, tolerating PO intake     - s/p calcitonin 4mg/kg for total of 4 doses    - s/p pamidronate 60 mg x 6 hours 4/23            #HTN    pt with labile BPs. at times elevated goal <140/80    - holding home lasix, hydralazine and metoprolol succinate     - introduce home antihypertensives as needed        #electrolyte abnl    Patient w/ evidence of CKD, Bl Cr 2.2-2.5. Presented w/ lactate 3.2, likely in setting of acute infection. Now persistently hyperkalemic w/ elevated Mg, uptrending lactate     - daily lokelma 10mg for persistent hyperkalemia          #type 2 DM    - A1C elevated at 10, no need for aggressive control in the 89 years old     - c/w 25 U Basaglliza Lock[place], titrate based on patients oral intake.         Medically cleared for discharge to home with hospice. Pt has oxygen set-up at home as per  89 yo F with a PMHx of breast cancer s/p lumpectomy and hormone therapy, T2DM, HTN presents to the ED with complaints of weakness, loss of appetite, cough, SOB, and diarrhea, found to have tachycardia, hypoxia, leukocytosis, and b/l pleural infiltrates with L sided pleural effusion, concerning for possible COVID with ?superimposed PNA, CT chest showing worsening bl metastatic disease of the lungs, large L pleural effusion w/ complete collapse of left lung, now Covid neg x 3; course c/b UTI on abx and hypercalcemia likely in the setting of malignancy and persistent hyperkalemia, started on daily lokelma (4/24-)    GOC with plans for home hospice.                #pleural effusion    L sided pleural effusion likely metastatic in nature. CT chest showing complete collapse of left lung with large pleural effusion     -pt seen by thoracic surgery:     - Extensive long discussion by thoracic with patient's HCP, regarding risks and benefits of Pleurx catheter including risk of infection, pain, technical difficulty of catheter placement due to size of tumor and continued tumor growth vs benefit of drainage.    - Patient is DNR/DNI, patient's family requesting to have pt discharge home w/ home hospice and refusing pleurx placement for drainage of pleural effusion at this time.    - case has also been d/w patient's outpt oncologist, Dr. Donny Sadler who is in agreement with plan.        #breast cancer with POD    Patient now DNR/DNI, with goal of home w/ home hospice        #SIRS/UTI    Meeting SIRS criteria on presentation 2/2 sig leukocytosis w/ bandemia, tachycardia and tachypnea w/ lactic acidosis at 3.2, all concerning for sepsis 2/2 possible UTI given UA+ for 50 WBC, large LE, many bacteria. other ddx include worsening malignancy with inc metastatic burden     - wbc uptrending patient remains afebrile- all likely secondary to breast cancer POD     - UCx >100,000 GNR - received course of antibiotic therapy, to be completed outpt for 3 days (complete 7 day course)        #hypercalcemia    Ca elevated to 12.5, concern for HyperCA of malignancy given hx of breast cancer and worsening metastatic disease on CTCAP w/ new lin involvement: inc tumor burden bl lungs, possible new hepatic metastasis, worsening disease of left breast/chest wall, new height loss T2    hypercalcemia will likely not resolve given underlaying malignancy and bone mets.     -unable to give IVF given pulm status and large pleural effusion, tolerating PO intake     - s/p calcitonin 4mg/kg for total of 4 doses    - s/p pamidronate 60 mg x 6 hours 4/23            #HTN    pt with labile BPs. at times elevated goal <140/80    - holding home lasix, hydralazine and metoprolol succinate     - introduce home antihypertensives as needed at home     -use lasix prn for symtoms         #electrolyte abnl    Patient w/ evidence of CKD, Bl Cr 2.2-2.5. Presented w/ lactate 3.2, likely in setting of acute infection. Now persistently hyperkalemic w/ elevated Mg, uptrending lactate     - daily lokelma 10mg for persistent hyperkalemia started inpt    -per discussion with family and Dr. Post, will not check labs inpt.     labs at dc as per discretion of home hospice         #type 2 DM    - A1C elevated at 10, no need for aggressive control in the 89 years old     - c/w 25 U Basaglar KwikPen, titrate based on patients oral intake.     low dose ISS as needed based on PO intake /GOC/and assessment of FSBG as determined by home hospice        Medically cleared for discharge to home with hospice. Pt has oxygen set-up at home as per

## 2020-04-26 NOTE — PROGRESS NOTE ADULT - SUBJECTIVE AND OBJECTIVE BOX
Laura Mendes MD  Pager  [place] [phone]  [phone] (nights and weekends)    SUBJECTIVE:    pt is resting, Po intake decreased.  Plan for home hospice today        MEDICATIONS  (STANDING):  aspirin  chewable 81 milliGRAM(s) Oral daily  cefepime   IVPB 1000 milliGRAM(s) IV Intermittent every 12 hours  cefepime   IVPB      dextrose 5%. 1000 milliLiter(s) (50 mL/Hr) IV Continuous <Continuous>  dextrose 50% Injectable 12.5 Gram(s) IV Push once  dextrose 50% Injectable 25 Gram(s) IV Push once  dextrose 50% Injectable 25 Gram(s) IV Push once  donepezil 10 milliGRAM(s) Oral at bedtime  heparin   Injectable 5000 Unit(s) SubCutaneous every 12 hours  insulin glargine Injectable (LANTUS) 30 Unit(s) SubCutaneous at bedtime  insulin lispro (HumaLOG) corrective regimen sliding scale   SubCutaneous three times a day before meals  simvastatin 20 milliGRAM(s) Oral at bedtime  sodium zirconium cyclosilicate 10 Gram(s) Oral daily    MEDICATIONS  (PRN):  dextrose 40% Gel 15 Gram(s) Oral once PRN Blood Glucose LESS THAN 70 milliGRAM(s)/deciliter  glucagon  Injectable 1 milliGRAM(s) IntraMuscular once PRN Glucose LESS THAN 70 milligrams/deciliter  ondansetron Injectable 4 milliGRAM(s) IV Push every 6 hours PRN Nausea and/or Vomiting      Vital Signs Last 24 Hrs  T(C): 36.3 (26 Apr 2020 08:35), Max: 37.2 (25 Apr 2020 22:29)  T(F): 97.4 (26 Apr 2020 08:35), Max: 99 (25 Apr 2020 22:29)  HR: 110 (26 Apr 2020 08:35) (101 - 117)  BP: 110/79 (26 Apr 2020 08:35) (103/62 - 153/73)  BP(mean): --  RR: 18 (26 Apr 2020 08:35) (17 - 20)  SpO2: 93% (26 Apr 2020 08:35) (83% - 97%)    CAPILLARY BLOOD GLUCOSE      POCT Blood Glucose.: 103 mg/dL (26 Apr 2020 12:08)  POCT Blood Glucose.: 110 mg/dL (26 Apr 2020 08:36)  POCT Blood Glucose.: 122 mg/dL (25 Apr 2020 21:49)  POCT Blood Glucose.: 124 mg/dL (25 Apr 2020 17:21)    I&O's Summary    25 Apr 2020 07:01  -  26 Apr 2020 07:00  --------------------------------------------------------  IN: 490 mL / OUT: 350 mL / NET: 140 mL        PHYSICAL EXAM:  GENERAL: Looks stated age, NAD  CARDIOVASCULAR: Normal S1, S2  PULMONARY: Lungs clear to auscultation bilaterally. No wheezes/rales/rhonchi  GI: Abdomen non-distended, soft, Nontender.  Bowel sounds present  MSK/Ext:  No leg edema.  No calf tenderness bilaterally        LABS:    04-25    136  |  96  |  68<H>  ----------------------------<  124<H>  5.1   |  19<L>  |  2.70<H>    Ca    12.6<H>      25 Apr 2020 06:13                  RADIOLOGY & ADDITIONAL TESTS:

## 2020-04-26 NOTE — PROGRESS NOTE ADULT - SUBJECTIVE AND OBJECTIVE BOX
CHIEF COMPLAINT: f/up sob, abnormal CT-nodules and effusion left c/w malignant breast CA-no significant issues o/n    Interval Events: CTS evaln Dr Azevedo-family /pt not interested in pleurx here    REVIEW OF SYSTEMS:  Constitutional: No fevers or chills. No weight loss. + fatigue or generalized malaise.  Eyes: No itching or discharge from the eyes  ENT: No ear pain. No ear discharge. No nasal congestion. No post nasal drip. No epistaxis. No throat pain. No sore throat. No difficulty swallowing.   CV: No chest pain. No palpitations. No lightheadedness or dizziness.   Resp: No dyspnea at rest. + dyspnea on exertion. No orthopnea. No wheezing. No cough. No stridor. No sputum production. No chest pain with respiration.  GI: No nausea. No vomiting. No diarrhea.  MSK: No joint pain or pain in any extremities  Integumentary: No skin lesions. No pedal edema.  Neurological: + gross motor weakness. No sensory changes.  +[ ] All other systems negative  [ ] Unable to assess ROS because ________    OBJECTIVE:  ICU Vital Signs Last 24 Hrs  T(C): 36.4 (26 Apr 2020 06:20), Max: 37.2 (25 Apr 2020 22:29)  T(F): 97.5 (26 Apr 2020 06:20), Max: 99 (25 Apr 2020 22:29)  HR: 110 (26 Apr 2020 06:20) (101 - 117)  BP: 110/68 (26 Apr 2020 06:20) (103/62 - 153/73)  BP(mean): --  ABP: --  ABP(mean): --  RR: 17 (26 Apr 2020 06:20) (17 - 20)  SpO2: 93% (26 Apr 2020 06:20) (93% - 97%)        04-25 @ 07:01  -  04-26 @ 07:00  --------------------------------------------------------  IN: 490 mL / OUT: 350 mL / NET: 140 mL      CAPILLARY BLOOD GLUCOSE      POCT Blood Glucose.: 122 mg/dL (25 Apr 2020 21:49)      PHYSICAL EXAM: NAD in bed on NC O2  General: Awake, alert, oriented X 3.   HEENT: Atraumatic, normocephalic.                 Mallampatti Grade 3                No nasal congestion.                No tonsillar or pharyngeal exudates.  Lymph Nodes: No palpable lymphadenopathy  Neck: No JVD. No carotid bruit.   Respiratory: Normal chest expansion-reduced BS left 1/2 up-dullness to percussion                         Normal percussion                         Normal and equal air entry                         No wheeze, rhonchi or rales.  Cardiovascular: S1 S2 normal. No murmurs, rubs or gallops.   Abdomen: Soft, non-tender, non-distended. No organomegaly. Normoactive bowel sounds.  Extremities: Warm to touch. Peripheral pulse palpable. No pedal edema.   Skin: No rashes or skin lesions  Neurological: Motor and sensory examination equal and abnormal in all four extremities.  Psychiatry: Appropriate mood and affect.    HOSPITAL MEDICATIONS:  MEDICATIONS  (STANDING):  aspirin  chewable 81 milliGRAM(s) Oral daily  cefepime   IVPB 1000 milliGRAM(s) IV Intermittent every 12 hours  cefepime   IVPB      dextrose 5%. 1000 milliLiter(s) (50 mL/Hr) IV Continuous <Continuous>  dextrose 50% Injectable 12.5 Gram(s) IV Push once  dextrose 50% Injectable 25 Gram(s) IV Push once  dextrose 50% Injectable 25 Gram(s) IV Push once  donepezil 10 milliGRAM(s) Oral at bedtime  heparin   Injectable 5000 Unit(s) SubCutaneous every 12 hours  insulin glargine Injectable (LANTUS) 30 Unit(s) SubCutaneous at bedtime  insulin lispro (HumaLOG) corrective regimen sliding scale   SubCutaneous three times a day before meals  simvastatin 20 milliGRAM(s) Oral at bedtime  sodium zirconium cyclosilicate 10 Gram(s) Oral daily    MEDICATIONS  (PRN):  dextrose 40% Gel 15 Gram(s) Oral once PRN Blood Glucose LESS THAN 70 milliGRAM(s)/deciliter  glucagon  Injectable 1 milliGRAM(s) IntraMuscular once PRN Glucose LESS THAN 70 milligrams/deciliter  ondansetron Injectable 4 milliGRAM(s) IV Push every 6 hours PRN Nausea and/or Vomiting      LABS:                        14.7   34.56 )-----------( 350      ( 24 Apr 2020 08:21 )             47.0     04-25    136  |  96  |  68<H>  ----------------------------<  124<H>  5.1   |  19<L>  |  2.70<H>    Ca    12.6<H>      25 Apr 2020 06:13  Phos  3.8     04-24  Mg     2.7     04-24      PT/INR - ( 24 Apr 2020 09:46 )   PT: 12.0 sec;   INR: 1.04 ratio                   MICROBIOLOGY:     RADIOLOGY:  [ ] Reviewed and interpreted by me    Point of Care Ultrasound Findings:    PFT:    EKG:

## 2020-04-26 NOTE — PROGRESS NOTE ADULT - ATTENDING COMMENTS
as above-CTS evaln for pleurx (family/pt not interested), there is no role for thoracentesis here a definitive procedure is the only real option  multifactorial dyspnea-sepsis (UTI-? PNA), malignant effusion left sided, debility---O2 NC sat above 90%  malignant breast CA-metastatic----would strongly consider pleurx cath (CTS evaln done 4/25); no role for transient thoracentesis  UTI/?PNA--continue ABX-cefepime to complete rx as per ID  oncology--progressive Dz--as per onc                    Agree w/ palliative care f/up  DVT prophylaxis     GI prophylaxis             comfort care    Gumaro Ramos MD-Pulmonary   [phone]

## 2020-04-26 NOTE — PROGRESS NOTE ADULT - ASSESSMENT
89 yo F with Pmhx of breast cancer s/p lumpectomy and hormone therapy, DMII, HTN presents to the ED with complaints of weakness, loss of appetite, cough, SOB, and diarrhea, found to have tachycardia, hypoxia, leukocytosis, and b/l pleural opacities with L sided pleural effusion, (DOUBT COVID with 2 negative tests) and superimposed PNA and UTI.   *****************  4/25-no changes over all  4/26-CTS evaln for pleurx-pt/family not interested

## 2020-04-26 NOTE — DISCHARGE NOTE NURSING/CASE MANAGEMENT/SOCIAL WORK - PATIENT PORTAL LINK FT
You can access the FollowMyHealth Patient Portal offered by [facility] by registering at the following website: [email or web address]. By joining [facility]’s FollowMyHealth portal, you will also be able to view your health information using other applications (apps) compatible with our system.

## 2020-04-26 NOTE — PROGRESS NOTE ADULT - PROBLEM SELECTOR PROBLEM 4
Electrolyte abnormality
Electrolyte abnormality
Other hyperlipidemia
Pleural effusion
Pleural effusion
Suspected 2019 novel coronavirus infection

## 2020-04-26 NOTE — PROGRESS NOTE ADULT - PROBLEM SELECTOR PROBLEM 2
Hypercalcemia
Hypercalcemia
Acute kidney injury superimposed on chronic kidney disease
Hypercalcemia
Suspected 2019 novel coronavirus infection
Type 2 diabetes mellitus with other specified complication, with long-term current use of insulin

## 2020-04-26 NOTE — DISCHARGE NOTE PROVIDER - NSDCMRMEDTOKEN_GEN_ALL_CORE_FT
acetaminophen 325 mg oral tablet: 1 tab(s) orally every 6 hours, As Needed  Artificial Tears ophthalmic solution: 1 drop(s) to each affected eye 2 times a day, As Needed  aspirin 81 mg oral tablet: 1 tab(s) orally once a day  Basaglar KwikPen 100 units/mL subcutaneous solution: 40 unit(s) subcutaneous once a day (in the morning) &amp; 30 unit(s) in the evening  donepezil 10 mg oral tablet: 1 tab(s) orally once a day (at bedtime)  furosemide 20 mg oral tablet: 1 tab(s) orally every other day  MiraLax oral powder for reconstitution: 17 gram(s) orally 2 times a day  omeprazole 20 mg oral delayed release capsule: 1 cap(s) orally once a day  ondansetron 4 mg oral tablet: 1 tab(s) orally every 8 hours   simvastatin 20 mg oral tablet: 1 tab(s) orally once a day (at bedtime) acetaminophen 325 mg oral tablet: 1 tab(s) orally every 6 hours, As Needed  Basaglar KwikPen 100 units/mL subcutaneous solution: 25 unit(s) subcutaneous once a day  donepezil 10 mg oral tablet: 1 tab(s) orally once a day (at bedtime)  furosemide 20 mg oral tablet: 1 tab(s) orally every other day, As Needed  MiraLax oral powder for reconstitution: 17 gram(s) orally 2 times a day, As Needed  ondansetron 4 mg oral disintegrating strip: 1 each orally 3 times a day, As Needed -for nausea acetaminophen 325 mg oral tablet: 1 tab(s) orally every 6 hours, As Needed  Basaglar KwikPen 100 units/mL subcutaneous solution: 25 unit(s) subcutaneous once a day  cefpodoxime 200 mg oral tablet: 1 tab(s) orally every 12 hours   donepezil 10 mg oral tablet: 1 tab(s) orally once a day (at bedtime)  furosemide 20 mg oral tablet: 1 tab(s) orally every other day, As Needed  MiraLax oral powder for reconstitution: 17 gram(s) orally 2 times a day, As Needed  ondansetron 4 mg oral disintegrating strip: 1 each orally 3 times a day, As Needed -for nausea

## 2020-04-26 NOTE — PROGRESS NOTE ADULT - PROBLEM SELECTOR PROBLEM 7
Goals of care, counseling/discussion
Goals of care, counseling/discussion
Essential hypertension
Metabolic acidosis

## 2020-04-27 DIAGNOSIS — R11.0 NAUSEA: ICD-10-CM

## 2020-04-27 DIAGNOSIS — N39.0 URINARY TRACT INFECTION, SITE NOT SPECIFIED: ICD-10-CM

## 2020-04-27 LAB
CULTURE RESULTS: SIGNIFICANT CHANGE UP
CULTURE RESULTS: SIGNIFICANT CHANGE UP
SPECIMEN SOURCE: SIGNIFICANT CHANGE UP
SPECIMEN SOURCE: SIGNIFICANT CHANGE UP

## 2020-04-27 RX ORDER — CEPHALEXIN 500 MG/1
500 CAPSULE ORAL 3 TIMES DAILY
Qty: 21 | Refills: 0 | Status: DISCONTINUED | COMMUNITY
Start: 2019-10-01 | End: 2020-04-27

## 2020-04-28 RX ORDER — FUROSEMIDE 20 MG/1
20 TABLET ORAL EVERY OTHER DAY
Qty: 45 | Refills: 0 | Status: ACTIVE | COMMUNITY
Start: 1900-01-01 | End: 1900-01-01

## 2020-04-28 RX ORDER — ACETAMINOPHEN 160 MG/5ML
160 LIQUID ORAL
Qty: 2 | Refills: 0 | Status: ACTIVE | COMMUNITY
Start: 2020-04-28 | End: 1900-01-01

## 2020-04-28 RX ORDER — CEFPODOXIME PROXETIL 200 MG/1
200 TABLET, FILM COATED ORAL
Qty: 14 | Refills: 0 | Status: ACTIVE | COMMUNITY
Start: 2020-04-27 | End: 1900-01-01

## 2020-04-28 RX ORDER — ONDANSETRON 4 MG/1
4 TABLET, ORALLY DISINTEGRATING ORAL EVERY 8 HOURS
Qty: 30 | Refills: 0 | Status: ACTIVE | COMMUNITY
Start: 2020-04-27 | End: 1900-01-01

## 2020-04-29 LAB — PTH RELATED PROT SERPL-MCNC: <2 PMOL/L — SIGNIFICANT CHANGE UP

## 2021-06-30 NOTE — H&P PST ADULT - MAMMOGRAM, LAST, PROFILE
59 yr old f that presents s/p abrasion. area cleaned bandaged. pt given wound precautions. pt discharged with pcp follow up and strict return precautions. 2019

## 2022-08-06 NOTE — PATIENT PROFILE ADULT - NSPROEDALEARNPREFOTH_GEN_A_NUR
"Cultures swabbed and sent to lab for analysis. Blisters unable to burst or pop despite vigorously scrubbing/attempting burst by RN x2. Patient tolerated procedure well and states, "it just itches".   " individual instruction/written material/group instruction/verbal instruction/audio/skill demonstration/video

## 2022-10-10 NOTE — HISTORY OF PRESENT ILLNESS
[Disease: _____________________] : Disease: [unfilled] [AJCC Stage: ____] : AJCC Stage: [unfilled] [de-identified] : Age 89: Left breast cancer\par Palpable breast mass on exam and had mammogram/ sonogram on 3/25/19 which showed large rounded mass (6.3 x 5.4 cm) in the outer retroareolar L breast 1:00. She had left breast biopsy on 4/1/19 which showed poorly differentiated carcinoma that was ER 25%, DE 0%, [id number] nonamplified. She saw Dr Sharif for surgical evaluation.  [de-identified] : poorly differentiated ER 25%, MS 0%, [id number] nonamplified  [de-identified] : She denies any pain over the left breast but aware of the mass and constantly runs hand over area. She has aide 8 hours/ day: aide helps her get out of bed and bathe. She then eats and rests. She gets tired and has to rest. She is mainly in the chair throughout the day. Prior to the aide leaving around 5pm; she eats her dinner and put back in bed before the aide leaves. She walks with rolling walker but has had falls. Last fall few months ago due to walker hitting uneven pavement and pt fell on her side despite having aide. Her main concern is not having enough help at home in case she is ill after breast surgery. She takes her medication without trouble.  ambulatory

## 2022-10-13 PROBLEM — Z13.31 SCREENING FOR DEPRESSION: Status: ACTIVE | Noted: 2019-09-30

## 2022-12-22 NOTE — H&P PST ADULT - LAB RESULTS AND INTERPRETATION
CONSTITUTIONAL: Well-appearing; well-nourished; in no apparent distress.   EYES: PERRL; EOM intact.   CARDIOVASCULAR: Normal S1, S2; no murmurs, rubs, or gallops.   RESPIRATORY: Normal chest excursion with respiration; breath sounds clear and equal bilaterally; no wheezes, rhonchi, or rales.  GI/: Normal bowel sounds; non-distended; non-tender; no palpable organomegaly.   MS: No evidence of trauma or deformity. Non-tender to palpation. No scoliosis. No CVA tenderness. Normal ROM in all four extremities; non-tender to palpation; distal pulses are normal.   SKIN: TTP/erythematous/swelling to left inner groin next to vulva.  No palpable abscess noted.  No purulent drainage or bleeding.  NEURO/PSYCH: A & O x 4; grossly unremarkable. CBC CMP HgbA1C

## 2023-02-16 NOTE — H&P PST ADULT - TOBACCO USE
She was reevaluated by Dr. Irving Florence on 2/13/2023 for microscopic hematuria, incomplete bladder emptying, urinary incontinence, urinary urgency, and urinary frequency.  She had a negative microscopic hematuria work-up in November 2021.  Her postvoid residual was 176 mL.  And she reports a sensation of incomplete bladder emptying and straining to initiate the stream.  Her symptoms have been getting progressively worse over 3 to 4 months.  She is feeling better with her Myrbetriq and tamsulosin at the visit on 2/13/2023 Myrbetriq and tamsulosin was discontinued.  She is scheduled for pelvic floor physical therapy that was ordered by her urogynecologist.  She needs reevaluation in 6 months for repeat postvoid residual.     Never smoker

## 2023-05-11 NOTE — H&P PST ADULT - BREASTS COMMENTS
Otc Regimen: Swansons citrus vitamin bid
Detail Level: Simple
gauze dressing to left breast with serous drainage noted on dressing

## 2023-07-21 NOTE — H&P PST ADULT - EXTREMITIES
Patient with a history of depression and anxiety. Her depression is well controlled at this time  It appears that her anxiety is slightly flaring, some of this may be related to recent stressor of starting new job. For now continue escitalopram 20 mg daily. Advise seeing her therapist more regularly. If without improvement, can consider adding buspirone. Follow-up in 3 months. detailed exam

## 2024-07-31 NOTE — PATIENT PROFILE ADULT - FUNCTIONAL SCREEN CURRENT LEVEL: BATHING, MLM
What Type Of Note Output Would You Prefer (Optional)?: Standard Output
How Severe Are Your Spot(S)?: mild
Have Your Spot(S) Been Treated In The Past?: has not been treated
Hpi Title: Evaluation of Skin Lesions
2 = assistive person

## 2024-12-27 NOTE — PATIENT PROFILE ADULT - NSPROEDAABILITYLEARN_GEN_A_NUR
Continue to monitor temperature. Fever > 100.4  Tylenol or ibuprofen as needed  Can continue mucinex  Inhalers as needed, but definitely use  If no improvement in symptoms within 48 hours or sooner if worsen (fever, shortness of breath, chest pain) - in person visit.   
language/comprehension